# Patient Record
Sex: FEMALE | Race: WHITE | Employment: OTHER | URBAN - METROPOLITAN AREA
[De-identification: names, ages, dates, MRNs, and addresses within clinical notes are randomized per-mention and may not be internally consistent; named-entity substitution may affect disease eponyms.]

---

## 2017-02-14 ENCOUNTER — OFFICE VISIT (OUTPATIENT)
Dept: ORTHOPEDICS CLINIC | Facility: CLINIC | Age: 62
End: 2017-02-14

## 2017-02-14 DIAGNOSIS — M17.12 PRIMARY OSTEOARTHRITIS OF LEFT KNEE: ICD-10-CM

## 2017-02-14 DIAGNOSIS — M17.11 PRIMARY OSTEOARTHRITIS OF RIGHT KNEE: Primary | ICD-10-CM

## 2017-02-14 PROCEDURE — 99213 OFFICE O/P EST LOW 20 MIN: CPT | Performed by: ORTHOPAEDIC SURGERY

## 2017-02-14 PROCEDURE — 99212 OFFICE O/P EST SF 10 MIN: CPT | Performed by: ORTHOPAEDIC SURGERY

## 2017-02-14 RX ORDER — THYROID,PORK 15 MG
TABLET ORAL
Refills: 1 | COMMUNITY
Start: 2017-01-20 | End: 2017-07-24

## 2017-02-14 NOTE — PROGRESS NOTES
2/14/2017  Hannah Hardy  65/1955  64year old   female  Ag Pereira MD    HPI:   Patient presents with:  Knee Pain: Bilateral f/u - she had cortisone inj on l knee on 11/18/16 and Synvisc on the R knee on 11/18/16 - she states the injections d a soft calf. The patient has range motion from 0-120°. Patient has a intact cruciate and collateral ligament exam about the left knee. The patient has medial joint line tenderness to palpation on the left knee. Patient has soft calf.         ASSESSMEN

## 2017-02-23 ENCOUNTER — HOSPITAL ENCOUNTER (OUTPATIENT)
Dept: MRI IMAGING | Facility: HOSPITAL | Age: 62
Discharge: HOME | End: 2017-02-23
Attending: ORTHOPAEDIC SURGERY
Payer: COMMERCIAL

## 2017-02-23 DIAGNOSIS — M17.11 PRIMARY OSTEOARTHRITIS OF RIGHT KNEE: ICD-10-CM

## 2017-02-23 PROCEDURE — 73721 MRI JNT OF LWR EXTRE W/O DYE: CPT

## 2017-02-28 ENCOUNTER — TELEPHONE (OUTPATIENT)
Dept: INTERNAL MEDICINE CLINIC | Facility: CLINIC | Age: 62
End: 2017-02-28

## 2017-02-28 DIAGNOSIS — M25.569 ACUTE KNEE PAIN, UNSPECIFIED LATERALITY: Primary | ICD-10-CM

## 2017-02-28 NOTE — TELEPHONE ENCOUNTER
Pt has an appt with Dr. Mina Andino on Friday for knee injection  Pt was advised a referral is needed for an office visit   RE-Knee injection

## 2017-03-03 ENCOUNTER — OFFICE VISIT (OUTPATIENT)
Dept: ORTHOPEDICS CLINIC | Facility: CLINIC | Age: 62
End: 2017-03-03

## 2017-03-03 DIAGNOSIS — M17.11 PRIMARY OSTEOARTHRITIS OF RIGHT KNEE: ICD-10-CM

## 2017-03-03 DIAGNOSIS — M17.12 PRIMARY OSTEOARTHRITIS OF LEFT KNEE: Primary | ICD-10-CM

## 2017-03-03 PROCEDURE — 99213 OFFICE O/P EST LOW 20 MIN: CPT | Performed by: ORTHOPAEDIC SURGERY

## 2017-03-03 PROCEDURE — 20610 DRAIN/INJ JOINT/BURSA W/O US: CPT | Performed by: ORTHOPAEDIC SURGERY

## 2017-03-03 NOTE — PROGRESS NOTES
Per verbal order from Dr. Tejas Lunsford, draw up 5ml of 1% llidocaine for injection to left knee.   Jovanna Buckner RN

## 2017-03-03 NOTE — H&P
3/3/2017  Eulis Nissen  65/1955  64year old   female  Shawn Ortiz MD    HPI:   Patient presents with:  Knee Pain: left- pt here for synvisc one injection    This is a pleasant 78-year-old female with left knee osteoarthritis.   The patient com OTHER SURGICAL HISTORY      Comment Arthroscopy/Partial Medial Meniscetomy    ELECTROCARDIOGRAM, COMPLETE      Comment Scanned to Media Tab    ELBOW SURGERY Left 2015    Comment elbow radial head replacement      Family History   Problem Relation Age of On of both operative and non-operative treatment were discussed with the patient. This is a pleasant 57-year-old female with bilateral knee osteoarthritis. Patient desired a Synvisc injection into the left knee today.   The patient tolerated the procedure

## 2017-03-07 ENCOUNTER — OFFICE VISIT (OUTPATIENT)
Dept: INTERNAL MEDICINE CLINIC | Facility: CLINIC | Age: 62
End: 2017-03-07

## 2017-03-07 VITALS
DIASTOLIC BLOOD PRESSURE: 82 MMHG | HEART RATE: 69 BPM | HEIGHT: 67 IN | SYSTOLIC BLOOD PRESSURE: 125 MMHG | BODY MASS INDEX: 24.33 KG/M2 | WEIGHT: 155 LBS

## 2017-03-07 DIAGNOSIS — L71.9 ROSACEA: ICD-10-CM

## 2017-03-07 DIAGNOSIS — Z01.818 PREOP EXAMINATION: Primary | ICD-10-CM

## 2017-03-07 DIAGNOSIS — M17.10 OSTEOARTHRITIS OF KNEE, UNSPECIFIED LATERALITY, UNSPECIFIED OSTEOARTHRITIS TYPE: ICD-10-CM

## 2017-03-07 DIAGNOSIS — Z00.00 ROUTINE GENERAL MEDICAL EXAMINATION AT A HEALTH CARE FACILITY: ICD-10-CM

## 2017-03-07 PROCEDURE — 99244 OFF/OP CNSLTJ NEW/EST MOD 40: CPT | Performed by: INTERNAL MEDICINE

## 2017-03-07 PROCEDURE — 99212 OFFICE O/P EST SF 10 MIN: CPT | Performed by: INTERNAL MEDICINE

## 2017-03-07 RX ORDER — METRONIDAZOLE 10 MG/G
GEL TOPICAL
Qty: 55 G | Refills: 5 | Status: SHIPPED | OUTPATIENT
Start: 2017-03-07 | End: 2017-07-21

## 2017-03-07 NOTE — PROGRESS NOTES
HPI:    Patient ID: Kendra Wick is a 64year old female.     HPI     PreOP clearance  And general physical exam  Sees gyne for mammo and pap smeare  Surgeon Dr Ariana Don  right total knee arthroplasty and is scheduled for surgical intervention later on i Smokeless Status: Never Used                        Alcohol Use: Yes           0.0 oz/week       0 Standard drinks or equivalent per week       Comment: 2-3 glasses of wine/week         Review of Systems   Constitutional: Negative for fever, chills, ac Hives    HISTORY:  Past Medical History   Diagnosis Date   • GERD (gastroesophageal reflux disease)    • Hyperlipidemia    • Bone spur      R Foot   • Medial meniscus tear      2012   • Neck pain      neck stenosis   • Osteoarthritis    • Sinus Normal rate, regular rhythm, S1 normal, S2 normal, normal heart sounds and intact distal pulses. Exam reveals no gallop. No murmur heard. Pulmonary/Chest: Effort normal and breath sounds normal. No respiratory distress. She has no wheezes.  She has no 85-GALGUKI, FOLIC ACID SERUM(FOLATE), VITAMIN         B12, EKG 12-LEAD            (M17.9) Osteoarthritis of knee, unspecified laterality, unspecified osteoarthritis type  Plan: as above  Planned srugery    (L71.9) Rosacea  Plan: rx  Topical metroni

## 2017-03-10 ENCOUNTER — OFFICE VISIT (OUTPATIENT)
Dept: INTERNAL MEDICINE CLINIC | Facility: CLINIC | Age: 62
End: 2017-03-10

## 2017-03-10 VITALS
TEMPERATURE: 98 F | HEIGHT: 67 IN | HEART RATE: 67 BPM | SYSTOLIC BLOOD PRESSURE: 129 MMHG | DIASTOLIC BLOOD PRESSURE: 83 MMHG

## 2017-03-10 DIAGNOSIS — L30.9 DERMATITIS: Primary | ICD-10-CM

## 2017-03-10 PROCEDURE — 99212 OFFICE O/P EST SF 10 MIN: CPT | Performed by: INTERNAL MEDICINE

## 2017-03-10 PROCEDURE — 99213 OFFICE O/P EST LOW 20 MIN: CPT | Performed by: INTERNAL MEDICINE

## 2017-03-10 RX ORDER — OLOPATADINE HYDROCHLORIDE 1 MG/ML
SOLUTION/ DROPS OPHTHALMIC
Qty: 20 ML | Refills: 0 | Status: SHIPPED | OUTPATIENT
Start: 2017-03-10 | End: 2017-04-07

## 2017-03-10 RX ORDER — OLOPATADINE HYDROCHLORIDE 1 MG/ML
1 SOLUTION/ DROPS OPHTHALMIC 2 TIMES DAILY
Qty: 1 BOTTLE | Refills: 0 | Status: SHIPPED | OUTPATIENT
Start: 2017-03-10 | End: 2017-03-10

## 2017-03-10 RX ORDER — PHENOL 1.4 %
1 AEROSOL, SPRAY (ML) MUCOUS MEMBRANE DAILY
COMMUNITY
End: 2017-07-24

## 2017-03-10 RX ORDER — BIOTIN 1 MG
5000 TABLET ORAL DAILY
COMMUNITY
End: 2017-07-24

## 2017-03-10 NOTE — PROGRESS NOTES
Fiona Nguyễn is a 64year old female. Patient presents with:  Eye Pain: Pt c/o itching and burning pain in both eyes for a week    HPI:   She has noticed redness, itchiness and burning affecting the skin around both eyes for the past 1 week.   She also pain      neck stenosis   • Osteoarthritis    • Sinusitis    • Anxiety state    • OCD (obsessive compulsive disorder)    • S/P bunionectomy 1986     RIGHT   • H/O arthroscopy of left knee    • S/P arthroscopy of right knee    • Pathological dislocation of

## 2017-03-11 ENCOUNTER — APPOINTMENT (OUTPATIENT)
Dept: LAB | Facility: HOSPITAL | Age: 62
End: 2017-03-11
Attending: INTERNAL MEDICINE
Payer: COMMERCIAL

## 2017-03-11 ENCOUNTER — HOSPITAL ENCOUNTER (OUTPATIENT)
Dept: GENERAL RADIOLOGY | Facility: HOSPITAL | Age: 62
Discharge: HOME | End: 2017-03-11
Attending: INTERNAL MEDICINE
Payer: COMMERCIAL

## 2017-03-11 DIAGNOSIS — Z01.818 PREOP EXAMINATION: ICD-10-CM

## 2017-03-11 DIAGNOSIS — Z01.818 PREOP TESTING: ICD-10-CM

## 2017-03-11 DIAGNOSIS — Z00.00 ROUTINE GENERAL MEDICAL EXAMINATION AT A HEALTH CARE FACILITY: ICD-10-CM

## 2017-03-11 LAB
ALBUMIN SERPL BCP-MCNC: 4.2 G/DL (ref 3.5–4.8)
ALBUMIN/GLOB SERPL: 1.6 {RATIO} (ref 1–2)
ALP SERPL-CCNC: 82 U/L (ref 32–100)
ALT SERPL-CCNC: 25 U/L (ref 14–54)
ANION GAP SERPL CALC-SCNC: 7 MMOL/L (ref 0–18)
ANTIBODY SCREEN: NEGATIVE
APTT PPP: 36.3 SECONDS (ref 23.2–35.3)
AST SERPL-CCNC: 26 U/L (ref 15–41)
BILIRUB SERPL-MCNC: 0.8 MG/DL (ref 0.3–1.2)
BILIRUB UR QL: NEGATIVE
BUN SERPL-MCNC: 15 MG/DL (ref 8–20)
BUN/CREAT SERPL: 17.9 (ref 10–20)
CALCIUM SERPL-MCNC: 9.6 MG/DL (ref 8.5–10.5)
CHLORIDE SERPL-SCNC: 103 MMOL/L (ref 95–110)
CHOLEST SERPL-MCNC: 160 MG/DL (ref 110–200)
CLARITY UR: CLEAR
CO2 SERPL-SCNC: 28 MMOL/L (ref 22–32)
COLOR UR: YELLOW
CREAT SERPL-MCNC: 0.84 MG/DL (ref 0.5–1.5)
ERYTHROCYTE [DISTWIDTH] IN BLOOD BY AUTOMATED COUNT: 12.8 % (ref 11–15)
FOLATE SERPL-MCNC: >23.6 NG/ML
GLOBULIN PLAS-MCNC: 2.7 G/DL (ref 2.5–3.7)
GLUCOSE SERPL-MCNC: 116 MG/DL (ref 70–99)
GLUCOSE UR-MCNC: NEGATIVE MG/DL
HBA1C MFR BLD: 5.7 % (ref 4–6)
HCT VFR BLD AUTO: 41.5 % (ref 35–48)
HDLC SERPL-MCNC: 56 MG/DL
HGB BLD-MCNC: 14.1 G/DL (ref 12–16)
HGB UR QL STRIP.AUTO: NEGATIVE
INR BLD: 0.9 (ref 0.9–1.2)
KETONES UR-MCNC: NEGATIVE MG/DL
LDLC SERPL CALC-MCNC: 94 MG/DL (ref 0–99)
LEUKOCYTE ESTERASE UR QL STRIP.AUTO: NEGATIVE
MCH RBC QN AUTO: 32.2 PG (ref 27–32)
MCHC RBC AUTO-ENTMCNC: 34 G/DL (ref 32–37)
MCV RBC AUTO: 94.7 FL (ref 80–100)
MRSA DNA SPEC QL NAA+PROBE: NEGATIVE
NITRITE UR QL STRIP.AUTO: NEGATIVE
NONHDLC SERPL-MCNC: 104 MG/DL
OSMOLALITY UR CALC.SUM OF ELEC: 288 MOSM/KG (ref 275–295)
PH UR: 6 [PH] (ref 5–8)
PLATELET # BLD AUTO: 233 K/UL (ref 140–400)
PMV BLD AUTO: 7 FL (ref 7.4–10.3)
POTASSIUM SERPL-SCNC: 4.5 MMOL/L (ref 3.3–5.1)
PROT SERPL-MCNC: 6.9 G/DL (ref 5.9–8.4)
PROT UR-MCNC: NEGATIVE MG/DL
PROTHROMBIN TIME: 12 SECONDS (ref 11.8–14.5)
RBC # BLD AUTO: 4.38 M/UL (ref 3.7–5.4)
RH BLOOD TYPE: POSITIVE
SODIUM SERPL-SCNC: 138 MMOL/L (ref 136–144)
SP GR UR STRIP: 1.01 (ref 1–1.03)
T4 FREE SERPL-MCNC: 0.6 NG/DL (ref 0.58–1.64)
TRIGL SERPL-MCNC: 50 MG/DL (ref 1–149)
TSH SERPL-ACNC: 1.01 UIU/ML (ref 0.34–5.6)
UROBILINOGEN UR STRIP-ACNC: <2
VIT B12 SERPL-MCNC: 317 PG/ML (ref 181–914)
VIT C UR-MCNC: NEGATIVE MG/DL
WBC # BLD AUTO: 3.8 K/UL (ref 4–11)

## 2017-03-11 PROCEDURE — 71020 XR CHEST PA + LAT CHEST (CPT=71020): CPT

## 2017-03-11 PROCEDURE — 86850 RBC ANTIBODY SCREEN: CPT

## 2017-03-11 PROCEDURE — 93010 ELECTROCARDIOGRAM REPORT: CPT | Performed by: INTERNAL MEDICINE

## 2017-03-11 PROCEDURE — 82607 VITAMIN B-12: CPT

## 2017-03-11 PROCEDURE — 86901 BLOOD TYPING SEROLOGIC RH(D): CPT

## 2017-03-11 PROCEDURE — 85730 THROMBOPLASTIN TIME PARTIAL: CPT

## 2017-03-11 PROCEDURE — 84439 ASSAY OF FREE THYROXINE: CPT

## 2017-03-11 PROCEDURE — 80053 COMPREHEN METABOLIC PANEL: CPT

## 2017-03-11 PROCEDURE — 85610 PROTHROMBIN TIME: CPT

## 2017-03-11 PROCEDURE — 81003 URINALYSIS AUTO W/O SCOPE: CPT

## 2017-03-11 PROCEDURE — 86900 BLOOD TYPING SEROLOGIC ABO: CPT

## 2017-03-11 PROCEDURE — 93005 ELECTROCARDIOGRAM TRACING: CPT

## 2017-03-11 PROCEDURE — 85027 COMPLETE CBC AUTOMATED: CPT

## 2017-03-11 PROCEDURE — 82746 ASSAY OF FOLIC ACID SERUM: CPT

## 2017-03-11 PROCEDURE — 36415 COLL VENOUS BLD VENIPUNCTURE: CPT

## 2017-03-11 PROCEDURE — 83036 HEMOGLOBIN GLYCOSYLATED A1C: CPT

## 2017-03-11 PROCEDURE — 87641 MR-STAPH DNA AMP PROBE: CPT

## 2017-03-11 PROCEDURE — 84443 ASSAY THYROID STIM HORMONE: CPT

## 2017-03-11 PROCEDURE — 82306 VITAMIN D 25 HYDROXY: CPT

## 2017-03-11 PROCEDURE — 80061 LIPID PANEL: CPT

## 2017-03-13 LAB — 25(OH)D3 SERPL-MCNC: 61.2 NG/ML

## 2017-03-14 ENCOUNTER — TELEPHONE (OUTPATIENT)
Dept: ORTHOPEDICS CLINIC | Facility: CLINIC | Age: 62
End: 2017-03-14

## 2017-03-14 ENCOUNTER — OFFICE VISIT (OUTPATIENT)
Dept: ORTHOPEDICS CLINIC | Facility: CLINIC | Age: 62
End: 2017-03-14

## 2017-03-14 DIAGNOSIS — M17.11 PRIMARY OSTEOARTHRITIS OF RIGHT KNEE: Primary | ICD-10-CM

## 2017-03-14 DIAGNOSIS — M17.12 PRIMARY OSTEOARTHRITIS OF LEFT KNEE: ICD-10-CM

## 2017-03-14 PROCEDURE — 99213 OFFICE O/P EST LOW 20 MIN: CPT | Performed by: ORTHOPAEDIC SURGERY

## 2017-03-14 PROCEDURE — 99212 OFFICE O/P EST SF 10 MIN: CPT | Performed by: ORTHOPAEDIC SURGERY

## 2017-03-14 NOTE — TELEPHONE ENCOUNTER
Pt is having R knee sx on 3/20, pt states her L knee has gotten worse recently, states she feels L knee will be too weak to support recovery of R knee, asking for an appt today. Pls advise thank you.

## 2017-03-14 NOTE — H&P
3/14/2017  Cruz Severe  65/1955  64year old   female  Sandip Hollingsworth MD    HPI:   Patient presents with:  Knee Pain: left- pt states she is still having a lot pain in knee.  pt had Synvisc inj on 3/3/17    This is a pleasant 29-year-old female w Suspension by Nasal route. Disp:  Rfl:    Escitalopram Oxalate (LEXAPRO) 20 MG Oral Tab Take 40 mg by mouth daily.    Disp:  Rfl: 0      HISTORY:  Past Medical History   Diagnosis Date   • GERD (gastroesophageal reflux disease)    • Medial meniscus tear peroneal, deep peroneal, sural, saphenous, and tibial nerve distributions. The patient has 5/5 strength in tibialis anterior, gastrocsoleus complex, EHL, and FHL. The patient has medial and lateral joint line tenderness to palpation about the right knee.

## 2017-03-15 ENCOUNTER — TELEPHONE (OUTPATIENT)
Dept: INTERNAL MEDICINE CLINIC | Facility: CLINIC | Age: 62
End: 2017-03-15

## 2017-03-16 ENCOUNTER — TELEPHONE (OUTPATIENT)
Dept: INTERNAL MEDICINE CLINIC | Facility: CLINIC | Age: 62
End: 2017-03-16

## 2017-03-16 NOTE — TELEPHONE ENCOUNTER
YES   Tylenol only  NO NSAID    EKG STABLE  CXR no acute disease Negative  Labs  All WNL  OK to send her copies  Alexandralacy is cleared for surgery

## 2017-03-16 NOTE — TELEPHONE ENCOUNTER
Reason for Call        Medication Question                    Medication Question        Bri Banda MD (You)   [facility] Clinical Staff   10 hours ago   (5:33 AM)      YES   Tylenol only  NO NSAID    EKG STABLE  CXR no acute disease Negative  Labs  Al

## 2017-03-20 ENCOUNTER — ANESTHESIA EVENT (OUTPATIENT)
Dept: SURGERY | Facility: HOSPITAL | Age: 62
DRG: 470 | End: 2017-03-20
Payer: COMMERCIAL

## 2017-03-20 ENCOUNTER — SURGERY (OUTPATIENT)
Age: 62
End: 2017-03-20

## 2017-03-20 ENCOUNTER — HOSPITAL ENCOUNTER (INPATIENT)
Facility: HOSPITAL | Age: 62
LOS: 2 days | Discharge: HOME | DRG: 470 | End: 2017-03-22
Attending: ORTHOPAEDIC SURGERY | Admitting: ORTHOPAEDIC SURGERY
Payer: COMMERCIAL

## 2017-03-20 ENCOUNTER — APPOINTMENT (OUTPATIENT)
Dept: GENERAL RADIOLOGY | Facility: HOSPITAL | Age: 62
DRG: 470 | End: 2017-03-20
Attending: ORTHOPAEDIC SURGERY
Payer: COMMERCIAL

## 2017-03-20 ENCOUNTER — ANESTHESIA (OUTPATIENT)
Dept: SURGERY | Facility: HOSPITAL | Age: 62
DRG: 470 | End: 2017-03-20
Payer: COMMERCIAL

## 2017-03-20 DIAGNOSIS — M17.11 PRIMARY OSTEOARTHRITIS OF RIGHT KNEE: ICD-10-CM

## 2017-03-20 PROCEDURE — 64450 NJX AA&/STRD OTHER PN/BRANCH: CPT | Performed by: ANESTHESIOLOGY

## 2017-03-20 PROCEDURE — 0SRC0J9 REPLACEMENT OF RIGHT KNEE JOINT WITH SYNTHETIC SUBSTITUTE, CEMENTED, OPEN APPROACH: ICD-10-PCS | Performed by: ORTHOPAEDIC SURGERY

## 2017-03-20 PROCEDURE — 73560 X-RAY EXAM OF KNEE 1 OR 2: CPT

## 2017-03-20 PROCEDURE — 99232 SBSQ HOSP IP/OBS MODERATE 35: CPT | Performed by: HOSPITALIST

## 2017-03-20 DEVICE — CEMENT BONE ZIM PALICOS R +G: Type: IMPLANTABLE DEVICE | Site: KNEE | Status: FUNCTIONAL

## 2017-03-20 DEVICE — PSI PERSONA PS PIN GDE FEM-TIB: Type: IMPLANTABLE DEVICE | Site: KNEE | Status: FUNCTIONAL

## 2017-03-20 DEVICE — PSN ASF PS 10MM VE R 6-9 EF: Type: IMPLANTABLE DEVICE | Site: KNEE | Status: FUNCTIONAL

## 2017-03-20 DEVICE — ALL POLY PAT VE 35MM: Type: IMPLANTABLE DEVICE | Site: KNEE | Status: FUNCTIONAL

## 2017-03-20 DEVICE — PSN TIB STM 5 DEG SZ E R: Type: IMPLANTABLE DEVICE | Site: KNEE | Status: FUNCTIONAL

## 2017-03-20 DEVICE — PSN FEM PS CMT CCR STD SZ8 R: Type: IMPLANTABLE DEVICE | Site: KNEE | Status: FUNCTIONAL

## 2017-03-20 RX ORDER — MELATONIN
325
Status: DISCONTINUED | OUTPATIENT
Start: 2017-03-21 | End: 2017-03-22

## 2017-03-20 RX ORDER — FAMOTIDINE 20 MG/1
20 TABLET ORAL ONCE
Status: DISCONTINUED | OUTPATIENT
Start: 2017-03-20 | End: 2017-03-20 | Stop reason: HOSPADM

## 2017-03-20 RX ORDER — MORPHINE SULFATE 4 MG/ML
4 INJECTION, SOLUTION INTRAMUSCULAR; INTRAVENOUS EVERY 10 MIN PRN
Status: DISCONTINUED | OUTPATIENT
Start: 2017-03-20 | End: 2017-03-20 | Stop reason: HOSPADM

## 2017-03-20 RX ORDER — MORPHINE SULFATE 15 MG/1
15 TABLET ORAL EVERY 4 HOURS PRN
Status: ACTIVE | OUTPATIENT
Start: 2017-03-20 | End: 2017-03-21

## 2017-03-20 RX ORDER — HYDROMORPHONE HYDROCHLORIDE 1 MG/ML
0.6 INJECTION, SOLUTION INTRAMUSCULAR; INTRAVENOUS; SUBCUTANEOUS EVERY 5 MIN PRN
Status: DISCONTINUED | OUTPATIENT
Start: 2017-03-20 | End: 2017-03-20 | Stop reason: HOSPADM

## 2017-03-20 RX ORDER — MAGNESIUM HYDROXIDE 1200 MG/15ML
LIQUID ORAL CONTINUOUS PRN
Status: DISCONTINUED | OUTPATIENT
Start: 2017-03-20 | End: 2017-03-20

## 2017-03-20 RX ORDER — HALOPERIDOL 5 MG/ML
0.25 INJECTION INTRAMUSCULAR ONCE AS NEEDED
Status: DISCONTINUED | OUTPATIENT
Start: 2017-03-20 | End: 2017-03-20 | Stop reason: HOSPADM

## 2017-03-20 RX ORDER — DIPHENHYDRAMINE HYDROCHLORIDE 50 MG/ML
12.5 INJECTION INTRAMUSCULAR; INTRAVENOUS EVERY 4 HOURS PRN
Status: DISCONTINUED | OUTPATIENT
Start: 2017-03-20 | End: 2017-03-22

## 2017-03-20 RX ORDER — ACETAMINOPHEN 500 MG
1000 TABLET ORAL EVERY 8 HOURS
Status: DISPENSED | OUTPATIENT
Start: 2017-03-20 | End: 2017-03-21

## 2017-03-20 RX ORDER — SODIUM CHLORIDE 9 MG/ML
INJECTION, SOLUTION INTRAVENOUS CONTINUOUS
Status: DISCONTINUED | OUTPATIENT
Start: 2017-03-20 | End: 2017-03-22

## 2017-03-20 RX ORDER — MORPHINE SULFATE 4 MG/ML
4 INJECTION, SOLUTION INTRAMUSCULAR; INTRAVENOUS EVERY 2 HOUR PRN
Status: ACTIVE | OUTPATIENT
Start: 2017-03-20 | End: 2017-03-21

## 2017-03-20 RX ORDER — EPHEDRINE SULFATE 50 MG/ML
INJECTION, SOLUTION INTRAVENOUS AS NEEDED
Status: DISCONTINUED | OUTPATIENT
Start: 2017-03-20 | End: 2017-03-20 | Stop reason: SURG

## 2017-03-20 RX ORDER — ESCITALOPRAM OXALATE 10 MG/1
40 TABLET ORAL DAILY
Status: DISCONTINUED | OUTPATIENT
Start: 2017-03-20 | End: 2017-03-22

## 2017-03-20 RX ORDER — DEXAMETHASONE SODIUM PHOSPHATE 4 MG/ML
VIAL (ML) INJECTION AS NEEDED
Status: DISCONTINUED | OUTPATIENT
Start: 2017-03-20 | End: 2017-03-20 | Stop reason: SURG

## 2017-03-20 RX ORDER — CLINDAMYCIN PHOSPHATE 900 MG/50ML
900 INJECTION INTRAVENOUS EVERY 8 HOURS
Status: DISCONTINUED | OUTPATIENT
Start: 2017-03-20 | End: 2017-03-20

## 2017-03-20 RX ORDER — HYDROMORPHONE HYDROCHLORIDE 1 MG/ML
0.4 INJECTION, SOLUTION INTRAMUSCULAR; INTRAVENOUS; SUBCUTANEOUS EVERY 5 MIN PRN
Status: DISCONTINUED | OUTPATIENT
Start: 2017-03-20 | End: 2017-03-20 | Stop reason: HOSPADM

## 2017-03-20 RX ORDER — DOCUSATE SODIUM 100 MG/1
100 CAPSULE, LIQUID FILLED ORAL 2 TIMES DAILY
Status: DISCONTINUED | OUTPATIENT
Start: 2017-03-20 | End: 2017-03-22

## 2017-03-20 RX ORDER — SODIUM CHLORIDE, SODIUM LACTATE, POTASSIUM CHLORIDE, CALCIUM CHLORIDE 600; 310; 30; 20 MG/100ML; MG/100ML; MG/100ML; MG/100ML
INJECTION, SOLUTION INTRAVENOUS CONTINUOUS
Status: DISCONTINUED | OUTPATIENT
Start: 2017-03-20 | End: 2017-03-22

## 2017-03-20 RX ORDER — QUETIAPINE 25 MG/1
25 TABLET, FILM COATED ORAL NIGHTLY
Status: DISCONTINUED | OUTPATIENT
Start: 2017-03-20 | End: 2017-03-22

## 2017-03-20 RX ORDER — HYDROCODONE BITARTRATE AND ACETAMINOPHEN 5; 325 MG/1; MG/1
1 TABLET ORAL AS NEEDED
Status: DISCONTINUED | OUTPATIENT
Start: 2017-03-20 | End: 2017-03-20 | Stop reason: HOSPADM

## 2017-03-20 RX ORDER — SODIUM CHLORIDE 0.9 % (FLUSH) 0.9 %
10 SYRINGE (ML) INJECTION AS NEEDED
Status: DISCONTINUED | OUTPATIENT
Start: 2017-03-20 | End: 2017-03-22

## 2017-03-20 RX ORDER — DOXEPIN HYDROCHLORIDE 50 MG/1
1 CAPSULE ORAL DAILY
Status: DISCONTINUED | OUTPATIENT
Start: 2017-03-20 | End: 2017-03-22

## 2017-03-20 RX ORDER — MORPHINE SULFATE 2 MG/ML
2 INJECTION, SOLUTION INTRAMUSCULAR; INTRAVENOUS EVERY 2 HOUR PRN
Status: DISCONTINUED | OUTPATIENT
Start: 2017-03-20 | End: 2017-03-22

## 2017-03-20 RX ORDER — MORPHINE SULFATE 4 MG/ML
4 INJECTION, SOLUTION INTRAMUSCULAR; INTRAVENOUS EVERY 2 HOUR PRN
Status: DISCONTINUED | OUTPATIENT
Start: 2017-03-20 | End: 2017-03-22

## 2017-03-20 RX ORDER — DIPHENHYDRAMINE HYDROCHLORIDE 50 MG/ML
25 INJECTION INTRAMUSCULAR; INTRAVENOUS ONCE AS NEEDED
Status: ACTIVE | OUTPATIENT
Start: 2017-03-20 | End: 2017-03-20

## 2017-03-20 RX ORDER — OXYCODONE HCL 10 MG/1
10 TABLET, FILM COATED, EXTENDED RELEASE ORAL EVERY 12 HOURS
Qty: 10 TABLET | Refills: 0 | Status: SHIPPED | OUTPATIENT
Start: 2017-03-20 | End: 2017-03-22

## 2017-03-20 RX ORDER — NALOXONE HYDROCHLORIDE 0.4 MG/ML
80 INJECTION, SOLUTION INTRAMUSCULAR; INTRAVENOUS; SUBCUTANEOUS AS NEEDED
Status: DISCONTINUED | OUTPATIENT
Start: 2017-03-20 | End: 2017-03-20 | Stop reason: HOSPADM

## 2017-03-20 RX ORDER — HYDROCODONE BITARTRATE AND ACETAMINOPHEN 10; 325 MG/1; MG/1
2 TABLET ORAL EVERY 6 HOURS PRN
Qty: 40 TABLET | Refills: 0 | Status: SHIPPED | OUTPATIENT
Start: 2017-03-20 | End: 2017-04-07

## 2017-03-20 RX ORDER — ACETAMINOPHEN 325 MG/1
650 TABLET ORAL EVERY 4 HOURS PRN
Status: DISCONTINUED | OUTPATIENT
Start: 2017-03-20 | End: 2017-03-22

## 2017-03-20 RX ORDER — HYDROCODONE BITARTRATE AND ACETAMINOPHEN 5; 325 MG/1; MG/1
2 TABLET ORAL EVERY 4 HOURS PRN
Status: DISCONTINUED | OUTPATIENT
Start: 2017-03-20 | End: 2017-03-22

## 2017-03-20 RX ORDER — FLUTICASONE PROPIONATE 50 MCG
1 SPRAY, SUSPENSION (ML) NASAL DAILY
Status: DISCONTINUED | OUTPATIENT
Start: 2017-03-20 | End: 2017-03-22

## 2017-03-20 RX ORDER — DIPHENHYDRAMINE HCL 25 MG
25 CAPSULE ORAL EVERY 4 HOURS PRN
Status: DISCONTINUED | OUTPATIENT
Start: 2017-03-20 | End: 2017-03-22

## 2017-03-20 RX ORDER — ONDANSETRON 2 MG/ML
4 INJECTION INTRAMUSCULAR; INTRAVENOUS EVERY 4 HOURS PRN
Status: DISCONTINUED | OUTPATIENT
Start: 2017-03-20 | End: 2017-03-22

## 2017-03-20 RX ORDER — CLINDAMYCIN PHOSPHATE 900 MG/50ML
900 INJECTION INTRAVENOUS EVERY 8 HOURS
Status: COMPLETED | OUTPATIENT
Start: 2017-03-20 | End: 2017-03-21

## 2017-03-20 RX ORDER — ATORVASTATIN CALCIUM 40 MG/1
40 TABLET, FILM COATED ORAL
Status: DISCONTINUED | OUTPATIENT
Start: 2017-03-20 | End: 2017-03-21

## 2017-03-20 RX ORDER — MORPHINE SULFATE 4 MG/ML
6 INJECTION, SOLUTION INTRAMUSCULAR; INTRAVENOUS EVERY 2 HOUR PRN
Status: ACTIVE | OUTPATIENT
Start: 2017-03-20 | End: 2017-03-21

## 2017-03-20 RX ORDER — OXYCODONE HYDROCHLORIDE 5 MG/1
10 TABLET ORAL EVERY 4 HOURS PRN
Status: DISPENSED | OUTPATIENT
Start: 2017-03-20 | End: 2017-03-21

## 2017-03-20 RX ORDER — CLINDAMYCIN PHOSPHATE 900 MG/50ML
900 INJECTION INTRAVENOUS ONCE
Status: DISCONTINUED | OUTPATIENT
Start: 2017-03-20 | End: 2017-03-20 | Stop reason: HOSPADM

## 2017-03-20 RX ORDER — HYDROMORPHONE HYDROCHLORIDE 1 MG/ML
0.2 INJECTION, SOLUTION INTRAMUSCULAR; INTRAVENOUS; SUBCUTANEOUS EVERY 5 MIN PRN
Status: DISCONTINUED | OUTPATIENT
Start: 2017-03-20 | End: 2017-03-20 | Stop reason: HOSPADM

## 2017-03-20 RX ORDER — METOCLOPRAMIDE HYDROCHLORIDE 5 MG/ML
10 INJECTION INTRAMUSCULAR; INTRAVENOUS EVERY 6 HOURS PRN
Status: ACTIVE | OUTPATIENT
Start: 2017-03-20 | End: 2017-03-22

## 2017-03-20 RX ORDER — SODIUM PHOSPHATE, DIBASIC AND SODIUM PHOSPHATE, MONOBASIC 7; 19 G/133ML; G/133ML
1 ENEMA RECTAL ONCE AS NEEDED
Status: ACTIVE | OUTPATIENT
Start: 2017-03-20 | End: 2017-03-20

## 2017-03-20 RX ORDER — METOCLOPRAMIDE 10 MG/1
10 TABLET ORAL ONCE
Status: DISCONTINUED | OUTPATIENT
Start: 2017-03-20 | End: 2017-03-20 | Stop reason: HOSPADM

## 2017-03-20 RX ORDER — BISACODYL 10 MG
10 SUPPOSITORY, RECTAL RECTAL
Status: DISCONTINUED | OUTPATIENT
Start: 2017-03-20 | End: 2017-03-22

## 2017-03-20 RX ORDER — LIDOCAINE HYDROCHLORIDE 10 MG/ML
INJECTION, SOLUTION EPIDURAL; INFILTRATION; INTRACAUDAL; PERINEURAL AS NEEDED
Status: DISCONTINUED | OUTPATIENT
Start: 2017-03-20 | End: 2017-03-20 | Stop reason: SURG

## 2017-03-20 RX ORDER — MORPHINE SULFATE 2 MG/ML
2 INJECTION, SOLUTION INTRAMUSCULAR; INTRAVENOUS EVERY 2 HOUR PRN
Status: ACTIVE | OUTPATIENT
Start: 2017-03-20 | End: 2017-03-21

## 2017-03-20 RX ORDER — ONDANSETRON 2 MG/ML
INJECTION INTRAMUSCULAR; INTRAVENOUS AS NEEDED
Status: DISCONTINUED | OUTPATIENT
Start: 2017-03-20 | End: 2017-03-20 | Stop reason: SURG

## 2017-03-20 RX ORDER — MORPHINE SULFATE 2 MG/ML
2 INJECTION, SOLUTION INTRAMUSCULAR; INTRAVENOUS EVERY 10 MIN PRN
Status: DISCONTINUED | OUTPATIENT
Start: 2017-03-20 | End: 2017-03-20 | Stop reason: HOSPADM

## 2017-03-20 RX ORDER — SODIUM CHLORIDE, SODIUM LACTATE, POTASSIUM CHLORIDE, CALCIUM CHLORIDE 600; 310; 30; 20 MG/100ML; MG/100ML; MG/100ML; MG/100ML
INJECTION, SOLUTION INTRAVENOUS CONTINUOUS PRN
Status: DISCONTINUED | OUTPATIENT
Start: 2017-03-20 | End: 2017-03-20 | Stop reason: SURG

## 2017-03-20 RX ORDER — POLYETHYLENE GLYCOL 3350 17 G/17G
17 POWDER, FOR SOLUTION ORAL DAILY PRN
Status: DISCONTINUED | OUTPATIENT
Start: 2017-03-20 | End: 2017-03-22

## 2017-03-20 RX ORDER — OXYCODONE HCL 10 MG/1
10 TABLET, FILM COATED, EXTENDED RELEASE ORAL EVERY 12 HOURS
Status: DISCONTINUED | OUTPATIENT
Start: 2017-03-20 | End: 2017-03-22

## 2017-03-20 RX ORDER — MORPHINE SULFATE 2 MG/ML
1 INJECTION, SOLUTION INTRAMUSCULAR; INTRAVENOUS EVERY 2 HOUR PRN
Status: DISCONTINUED | OUTPATIENT
Start: 2017-03-20 | End: 2017-03-22

## 2017-03-20 RX ORDER — LEVOTHYROXINE AND LIOTHYRONINE 19; 4.5 UG/1; UG/1
15 TABLET ORAL DAILY
Status: DISCONTINUED | OUTPATIENT
Start: 2017-03-21 | End: 2017-03-22

## 2017-03-20 RX ORDER — MIDAZOLAM HYDROCHLORIDE 1 MG/ML
INJECTION INTRAMUSCULAR; INTRAVENOUS AS NEEDED
Status: DISCONTINUED | OUTPATIENT
Start: 2017-03-20 | End: 2017-03-20 | Stop reason: SURG

## 2017-03-20 RX ORDER — ZOLPIDEM TARTRATE 5 MG/1
5 TABLET ORAL NIGHTLY PRN
Status: DISCONTINUED | OUTPATIENT
Start: 2017-03-20 | End: 2017-03-22

## 2017-03-20 RX ORDER — SENNOSIDES 8.6 MG
17.2 TABLET ORAL NIGHTLY
Status: DISCONTINUED | OUTPATIENT
Start: 2017-03-20 | End: 2017-03-22

## 2017-03-20 RX ORDER — ACETAMINOPHEN 325 MG/1
650 TABLET ORAL ONCE
Status: COMPLETED | OUTPATIENT
Start: 2017-03-20 | End: 2017-03-20

## 2017-03-20 RX ORDER — ROPIVACAINE HYDROCHLORIDE 5 MG/ML
INJECTION, SOLUTION EPIDURAL; INFILTRATION; PERINEURAL AS NEEDED
Status: DISCONTINUED | OUTPATIENT
Start: 2017-03-20 | End: 2017-03-20 | Stop reason: SURG

## 2017-03-20 RX ORDER — OXYCODONE HYDROCHLORIDE 5 MG/1
5 TABLET ORAL EVERY 4 HOURS PRN
Status: DISPENSED | OUTPATIENT
Start: 2017-03-20 | End: 2017-03-21

## 2017-03-20 RX ORDER — HYDROCODONE BITARTRATE AND ACETAMINOPHEN 5; 325 MG/1; MG/1
1 TABLET ORAL EVERY 4 HOURS PRN
Status: DISCONTINUED | OUTPATIENT
Start: 2017-03-20 | End: 2017-03-22

## 2017-03-20 RX ORDER — ONDANSETRON 2 MG/ML
4 INJECTION INTRAMUSCULAR; INTRAVENOUS ONCE AS NEEDED
Status: DISCONTINUED | OUTPATIENT
Start: 2017-03-20 | End: 2017-03-20 | Stop reason: HOSPADM

## 2017-03-20 RX ORDER — DEXAMETHASONE SODIUM PHOSPHATE 10 MG/ML
INJECTION, SOLUTION INTRAMUSCULAR; INTRAVENOUS AS NEEDED
Status: DISCONTINUED | OUTPATIENT
Start: 2017-03-20 | End: 2017-03-20 | Stop reason: SURG

## 2017-03-20 RX ORDER — MORPHINE SULFATE 10 MG/ML
6 INJECTION, SOLUTION INTRAMUSCULAR; INTRAVENOUS EVERY 10 MIN PRN
Status: DISCONTINUED | OUTPATIENT
Start: 2017-03-20 | End: 2017-03-20 | Stop reason: HOSPADM

## 2017-03-20 RX ORDER — HYDROCODONE BITARTRATE AND ACETAMINOPHEN 5; 325 MG/1; MG/1
2 TABLET ORAL AS NEEDED
Status: DISCONTINUED | OUTPATIENT
Start: 2017-03-20 | End: 2017-03-20 | Stop reason: HOSPADM

## 2017-03-20 RX ADMIN — LIDOCAINE HYDROCHLORIDE 1 ML: 10 INJECTION, SOLUTION EPIDURAL; INFILTRATION; INTRACAUDAL; PERINEURAL at 11:10:00

## 2017-03-20 RX ADMIN — ROPIVACAINE HYDROCHLORIDE 20 ML: 5 INJECTION, SOLUTION EPIDURAL; INFILTRATION; PERINEURAL at 11:16:00

## 2017-03-20 RX ADMIN — EPHEDRINE SULFATE 10 MG: 50 INJECTION, SOLUTION INTRAVENOUS at 12:17:00

## 2017-03-20 RX ADMIN — EPHEDRINE SULFATE 10 MG: 50 INJECTION, SOLUTION INTRAVENOUS at 12:20:00

## 2017-03-20 RX ADMIN — ROPIVACAINE HYDROCHLORIDE 20 ML: 5 INJECTION, SOLUTION EPIDURAL; INFILTRATION; PERINEURAL at 11:10:00

## 2017-03-20 RX ADMIN — MIDAZOLAM HYDROCHLORIDE 2 MG: 1 INJECTION INTRAMUSCULAR; INTRAVENOUS at 11:10:00

## 2017-03-20 RX ADMIN — DEXAMETHASONE SODIUM PHOSPHATE 4 MG: 4 MG/ML VIAL (ML) INJECTION at 12:13:00

## 2017-03-20 RX ADMIN — SODIUM CHLORIDE, SODIUM LACTATE, POTASSIUM CHLORIDE, CALCIUM CHLORIDE: 600; 310; 30; 20 INJECTION, SOLUTION INTRAVENOUS at 13:20:00

## 2017-03-20 RX ADMIN — ONDANSETRON 4 MG: 2 INJECTION INTRAMUSCULAR; INTRAVENOUS at 13:52:00

## 2017-03-20 RX ADMIN — DEXAMETHASONE SODIUM PHOSPHATE 4 MG: 10 INJECTION, SOLUTION INTRAMUSCULAR; INTRAVENOUS at 11:10:00

## 2017-03-20 RX ADMIN — LIDOCAINE HYDROCHLORIDE 1 ML: 10 INJECTION, SOLUTION EPIDURAL; INFILTRATION; INTRACAUDAL; PERINEURAL at 11:16:00

## 2017-03-20 RX ADMIN — SODIUM CHLORIDE, SODIUM LACTATE, POTASSIUM CHLORIDE, CALCIUM CHLORIDE: 600; 310; 30; 20 INJECTION, SOLUTION INTRAVENOUS at 12:00:00

## 2017-03-20 RX ADMIN — DEXAMETHASONE SODIUM PHOSPHATE 4 MG: 10 INJECTION, SOLUTION INTRAMUSCULAR; INTRAVENOUS at 11:16:00

## 2017-03-20 RX ADMIN — SODIUM CHLORIDE, SODIUM LACTATE, POTASSIUM CHLORIDE, CALCIUM CHLORIDE: 600; 310; 30; 20 INJECTION, SOLUTION INTRAVENOUS at 14:18:00

## 2017-03-20 RX ADMIN — LIDOCAINE HYDROCHLORIDE 50 MG: 10 INJECTION, SOLUTION EPIDURAL; INFILTRATION; INTRACAUDAL; PERINEURAL at 12:04:00

## 2017-03-20 NOTE — PLAN OF CARE
DISCHARGE PLANNING    • Discharge to home or other facility with appropriate resources Progressing        HEMATOLOGIC - ADULT    • Maintains hematologic stability Progressing    • Free from bleeding injury Progressing        PAIN - ADULT    • Verbalizes/di

## 2017-03-20 NOTE — BRIEF OP NOTE
[facility] 45  Brief Op Note     Emily Leonard Location: OR   CSN [id number] MRN [id number]   Admission Date 3/20/2017 Operation Date 3/20/2017   Attending Physician Jw Ferreira MD Operating Physician Evelio Sierra MD

## 2017-03-20 NOTE — PROGRESS NOTES
[facility]    Progress Note    Jensincere Part Patient Status:  Inpatient    1955 MRN [id number]   Location [facility] 4W/SW/SE Attending Leni Morales MD   Hosp Day # 0 PCP Royce Shannon MD     Subjective:     Hunter Fuller Results  Component Value Date   WBC 3.8* 03/11/2017   HGB 14.1 03/11/2017   HCT 41.5 03/11/2017    03/11/2017   CREATSERUM 0.84 03/11/2017   BUN 15 03/11/2017    03/11/2017   K 4.5 03/11/2017    03/11/2017   CO2 28 03/11/2017   *

## 2017-03-20 NOTE — PHYSICAL THERAPY NOTE
PHYSICAL THERAPY KNEE EVALUATION - INPATIENT     Room Number: 415/415-A  Evaluation Date: 3/20/2017  Type of Evaluation: Initial  Physician Order: PT Eval and Treat    Presenting Problem: R TKA  Reason for Therapy: Mobility Dysfunction and Discharge Planni osteoarthritis of right knee  Active Problems:    Hyperlipidemia      Past Medical History  Past Medical History   Diagnosis Date   • GERD (gastroesophageal reflux disease)    • Medial meniscus tear      2012   • Neck pain      neck stenosis   • Osteoarthr 80    AM-PAC '6-Clicks' [address]  How much difficulty does the patient currently have. ..  -   Turning over in bed (including adjusting bedclothes, sheets and blankets)?: A Little   -   Sitting down on and standing up from a osmany Goal #5   Current Status    Goal #6 Patient independently performs home exercise program for ROM/strengthening per the instructions provided in preparation for discharge.    Goal #6  Current Status

## 2017-03-20 NOTE — ANESTHESIA POSTPROCEDURE EVALUATION
Patient: Devi Pulido    Procedure Summary     Date Anesthesia Start Anesthesia Stop Room / Location    03/20/17 1200 1419 300 [facility] MAIN OR 06 / 300 [facility] MAIN OR       Procedure Diagnosis Surgeon Responsible Provider    KNEE TOTAL REPLACEMENT (Right Knee) (Right

## 2017-03-20 NOTE — INTERVAL H&P NOTE
Pre-op Diagnosis: Right knee osteoarthritis    The above referenced H&P was reviewed by Tahir Cano MD on 3/20/2017, the patient was examined and no significant changes have occurred in the patient's condition since the H&P was performed.   I discus

## 2017-03-20 NOTE — H&P (VIEW-ONLY)
3/3/2017  Emily Aisha  65/1955  64year old   female  Evelio Sierra MD    HPI:   Patient presents with:  Knee Pain: left- pt here for synvisc one injection    This is a pleasant 77-year-old female with left knee osteoarthritis.   The patient com OTHER SURGICAL HISTORY      Comment Arthroscopy/Partial Medial Meniscetomy    ELECTROCARDIOGRAM, COMPLETE      Comment Scanned to Media Tab    ELBOW SURGERY Left 2015    Comment elbow radial head replacement      Family History   Problem Relation Age of On of both operative and non-operative treatment were discussed with the patient. This is a pleasant 26-year-old female with bilateral knee osteoarthritis. Patient desired a Synvisc injection into the left knee today.   The patient tolerated the procedure

## 2017-03-20 NOTE — ANESTHESIA PROCEDURE NOTES
Peripheral Block    Anesthesiologist:  Thor Anderson  Performed by:   Anesthesiologist  Patient Location:  Pre-op  Start Time:  3/20/2017 11:10 AM  End Time:  3/20/2017 11:13 AM  Site Identification: ultrasound guided, real time ultrasound guided and IMAGE pressure, negative aspiration for heme and no pain on injection  Paresthesia Pain:  None  Heart Rate Change: No    Slow Fractionated Injection: Yes

## 2017-03-20 NOTE — ANESTHESIA PREPROCEDURE EVALUATION
Anesthesia PreOp Note    HPI:     Rasheeda Beavers is a 64year old female who presents for preoperative consultation requested by: Tanvir Kessler MD    Date of Surgery: 3/20/2017    Procedure(s):  KNEE TOTAL REPLACEMENT  Indication: Right knee osteo capsules by mouth 3 (three) times daily with meals. Disp:  Rfl:  Past Week at Unknown time   Multiple Vitamins-Minerals (MULTIVITAMIN ADULTS 50+) Oral Tab Take 1 tablet by mouth daily.  Disp:  Rfl:  Past Week at Unknown time   MetroNIDAZOLE (METROGEL) 1 % E sodium chloride 0.9 % syringe  Intra-articular Once Reggie Balbuena MD    Tranexamic Acid (CYKLOKAPRON) 1,000 mg in sodium chloride 0.9 % 60 mL IVPB 1,000 mg Intravenous Once Reggie Balbuena MD    Tranexamic Acid (CYKLOKAPRON) 1,000 mg in sodium temperature is 97.7 °F (36.5 °C). Her blood pressure is 103/73 and her pulse is 76.  Her respiration is 16.    03/10/17  1821 03/20/17  1024   BP:  103/73   Pulse:  76   Temp:  97.7 °F (36.5 °C)   Resp:  16   Height: 1.702 m (5' 7\")    Weight: 68.04 kg (15

## 2017-03-20 NOTE — OPERATIVE REPORT
[facility]    PATIENT'S NAME: Nati Rowley   ATTENDING PHYSICIAN: Laura Vigil MD   OPERATING PHYSICIAN: Franklyn Mills MD   PATIENT ACCOUNT#:   [id number]    LOCATION:  [facility] [address] PACU 5 [facility] 10  MEDICAL RECORD #:   [id number]       DATE The patient's right lower extremity was then prepped and draped in standard surgical fashion. Perioperative antibiotics were infused. Confirmation of identity and laterality took place. A time-out was performed.     An incision, 2 fingerbreadths proximal allowed to cure. At this time, the knee was tested and had range of motion from 0 to 130 degrees. It was stable to varus valgus stress at 0 and 30 degrees. Patellar tracking was excellent. Excess cement was taken out of the wound.   The final polyethyle

## 2017-03-21 LAB
ERYTHROCYTE [DISTWIDTH] IN BLOOD BY AUTOMATED COUNT: 12.7 % (ref 11–15)
HCT VFR BLD AUTO: 31.5 % (ref 35–48)
HGB BLD-MCNC: 10.8 G/DL (ref 12–16)
MCH RBC QN AUTO: 32.6 PG (ref 27–32)
MCHC RBC AUTO-ENTMCNC: 34.2 G/DL (ref 32–37)
MCV RBC AUTO: 95.4 FL (ref 80–100)
PLATELET # BLD AUTO: 202 K/UL (ref 140–400)
PMV BLD AUTO: 7.4 FL (ref 7.4–10.3)
RBC # BLD AUTO: 3.3 M/UL (ref 3.7–5.4)
WBC # BLD AUTO: 12.2 K/UL (ref 4–11)

## 2017-03-21 PROCEDURE — 99232 SBSQ HOSP IP/OBS MODERATE 35: CPT | Performed by: HOSPITALIST

## 2017-03-21 RX ORDER — ATORVASTATIN CALCIUM 40 MG/1
40 TABLET, FILM COATED ORAL NIGHTLY
Status: DISCONTINUED | OUTPATIENT
Start: 2017-03-21 | End: 2017-03-22

## 2017-03-21 NOTE — CM/SW NOTE
CTL/Rounds: Spoke with patient at bedside regarding discharge planning. Patient states that she is independent with ADLs and driving. Patient lives with her partner who is retired and will be home to assist as needed.  She lives in a bi level home with [facility]

## 2017-03-21 NOTE — OCCUPATIONAL THERAPY NOTE
OCCUPATIONAL THERAPY EVALUATION - INPATIENT      Room Number: 415/415-A  Evaluation Date: 3/21/2017  Type of Evaluation: Initial  Presenting Problem:  (R TKA)    Physician Order: IP Consult to Occupational Therapy  Reason for Therapy: ADL/IADL Dysfunction TOTAL KNEE REPLACEMENT Right 3/20/17    Comment DR. PATRICK       HOME SITUATION  Type of Home: House  Home Layout: Multi-level  Lives With:  (Partner)    Toilet and Equipment: Standard height toilet  Shower/Tub and Equipment: Tub-shower combo (Stool) tested  Sit to Stand: Supervision    Toilet Transfer: CGA- no grab bars used (has riser at home but used standard height in room)  Shower Transfer: CGA  Chair Transfer: CGA  Car Transfer: CGA    Bedroom Mobility: CGA/Supervision      FUNCTIONAL ADL ASSESSM

## 2017-03-21 NOTE — PROGRESS NOTES
[facility]    Progress Note    Ayleen Venegas Patient Status:  Inpatient    1955 MRN [id number]   Location [facility] 4W/SW/SE Attending Lester Narayanan MD   Hosp Day # 1 PCP John Hurst MD     Subjective:  Pain well control • docusate sodium  100 mg Oral BID   • rivaroxaban  10 mg Oral Q24H   • OxyCODONE HCl ER  10 mg Oral Q12H   • ferrous sulfate  325 mg Oral Daily with breakfast   • thyroid  15 mg Oral Daily   • Fluticasone Propionate  1 spray Nasal Daily   • bacitracin (

## 2017-03-21 NOTE — PROGRESS NOTES
[address] [facility] Patient Status:  Inpatient    1955 MRN [id number]   Location [facility] 4W/SW/SE Attending Ashwin Zuñiga MD   [facility] Day # 1 PCP Burma Opitz, MD     Subjective:  Post-Operative Day: 1 Status Post right

## 2017-03-21 NOTE — PROGRESS NOTES
03/21/17 1451   Clinical Encounter Type   Visited With Patient   Routine Visit Introduction   Continue Visiting Yes    met with pt alone, and introduced her to spiritual care. Pt receptive of visit, but did not desire spiritual care at the time.

## 2017-03-21 NOTE — PHYSICAL THERAPY NOTE
PHYSICAL THERAPY KNEE TREATMENT NOTE - INPATIENT     Room Number: 951/059-N             Presenting Problem: R TKA    Problem List  Principal Problem:    Primary osteoarthritis of right knee  Active Problems:    Hyperlipidemia      ASSESSMENT   Increase se Need to walk in hospital room?: A Little   -   Climbing 3-5 steps with a railing?: A Little    AM-PAC Score:  Raw Score: 20   PT Approx Degree of Impairment Score: 35.83%   Standardized Score (AM-PAC Scale): 47.67   CMS Modifier (G-Code): CJ    FUNCTIONAL Goal  Patient's self-stated goal is: to go home    Goal #1  Patient is able to demonstrate supine - sit EOB @ level: modified independent       Goal #1     Current Status   ongoing 3/21/2017     Goal #2  Patient is able to demonstrate transfers Sit to/from

## 2017-03-22 VITALS
HEIGHT: 67 IN | HEART RATE: 81 BPM | OXYGEN SATURATION: 95 % | BODY MASS INDEX: 23.23 KG/M2 | TEMPERATURE: 100 F | RESPIRATION RATE: 18 BRPM | DIASTOLIC BLOOD PRESSURE: 70 MMHG | SYSTOLIC BLOOD PRESSURE: 138 MMHG | WEIGHT: 148 LBS

## 2017-03-22 LAB
ERYTHROCYTE [DISTWIDTH] IN BLOOD BY AUTOMATED COUNT: 12.8 % (ref 11–15)
HCT VFR BLD AUTO: 30 % (ref 35–48)
HGB BLD-MCNC: 10.2 G/DL (ref 12–16)
MCH RBC QN AUTO: 32.4 PG (ref 27–32)
MCHC RBC AUTO-ENTMCNC: 33.9 G/DL (ref 32–37)
MCV RBC AUTO: 95.7 FL (ref 80–100)
PLATELET # BLD AUTO: 204 K/UL (ref 140–400)
PMV BLD AUTO: 7.4 FL (ref 7.4–10.3)
RBC # BLD AUTO: 3.14 M/UL (ref 3.7–5.4)
WBC # BLD AUTO: 7.6 K/UL (ref 4–11)

## 2017-03-22 PROCEDURE — 99239 HOSP IP/OBS DSCHRG MGMT >30: CPT | Performed by: HOSPITALIST

## 2017-03-22 RX ORDER — OXYCODONE HCL 20 MG/1
20 TABLET, FILM COATED, EXTENDED RELEASE ORAL EVERY 12 HOURS
Qty: 10 TABLET | Refills: 0 | Status: SHIPPED | OUTPATIENT
Start: 2017-03-22 | End: 2017-04-04 | Stop reason: ALTCHOICE

## 2017-03-22 RX ORDER — HYDROCODONE BITARTRATE AND ACETAMINOPHEN 10; 325 MG/1; MG/1
2 TABLET ORAL EVERY 6 HOURS PRN
Status: DISCONTINUED | OUTPATIENT
Start: 2017-03-22 | End: 2017-03-22

## 2017-03-22 RX ORDER — OXYCODONE HCL 20 MG/1
20 TABLET, FILM COATED, EXTENDED RELEASE ORAL EVERY 12 HOURS
Status: DISCONTINUED | OUTPATIENT
Start: 2017-03-22 | End: 2017-03-22

## 2017-03-22 RX ORDER — PSEUDOEPHEDRINE HCL 30 MG
100 TABLET ORAL 2 TIMES DAILY
Qty: 60 CAPSULE | Refills: 0 | Status: SHIPPED | OUTPATIENT
Start: 2017-03-22 | End: 2017-04-07

## 2017-03-22 RX ORDER — HYDROCODONE BITARTRATE AND ACETAMINOPHEN 10; 325 MG/1; MG/1
1 TABLET ORAL EVERY 4 HOURS PRN
Status: DISCONTINUED | OUTPATIENT
Start: 2017-03-22 | End: 2017-03-22

## 2017-03-22 NOTE — PROGRESS NOTES
[address] [facility] Patient Status:  Inpatient    1955 MRN [id number]   Location [facility] 4W/SW/SE Attending Dudley Madrigal MD   Hosp Day # 2 PCP Hailey He MD     Subjective:  Post-Operative Day: 2 Status Post right

## 2017-03-22 NOTE — HOME CARE LIAISON
PATIENT IS 64YEAR OLD FEMALE S/P R TKA. MET WITH PATIENT AT BEDSIDE TO DISCUSS [address]. PATIENT REPORTS LIVING AT HOME WITH LIFE PARTNER WHO CAN ASSIST WITH CARE WHEN NEEDED. PATIENT WILL NEED WALKER AT TIME OF D/C FROM HOSPITAL.  PATIENT IS AGREEABLE

## 2017-03-22 NOTE — PLAN OF CARE
DISCHARGE PLANNING    • Discharge to home or other facility with appropriate resources Adequate for Discharge        HEMATOLOGIC - ADULT    • Maintains hematologic stability Adequate for Discharge    • Free from bleeding injury Adequate for Discharge

## 2017-03-22 NOTE — OCCUPATIONAL THERAPY NOTE
Pt not seen for OT at this time secondary to pt up in chair and refusing therapy session due to pain, 7/10. Nursing is aware. Pt stated partner assisted with dressing this AM for sock placement.   Pt stated ambulated to toilet earlier, with use of grab ba

## 2017-03-22 NOTE — PHYSICAL THERAPY NOTE
PHYSICAL THERAPY KNEE TREATMENT NOTE - INPATIENT     Room Number: 830/320-V         Presenting Problem: R TKA    Problem List  Principal Problem:    Primary osteoarthritis of right knee  Active Problems:    Hyperlipidemia      ASSESSMENT   Pain limits her None   -   Need to walk in hospital room?: A Little   -   Climbing 3-5 steps with a railing?: A Little    AM-PAC Score:  Raw Score: 22   PT Approx Degree of Impairment Score: 20.91%   Standardized Score (AM-PAC Scale): 53.28   CMS Modifier (G-Code): CJ Patient is able to demonstrate supine - sit EOB @ level: modified independent       Goal #1     Current Status   mod I 3/22/2017     Goal #2  Patient is able to demonstrate transfers Sit to/from Stand at assistance level: modified independent       Goal #2

## 2017-03-23 ENCOUNTER — TELEPHONE (OUTPATIENT)
Dept: ORTHOPEDICS CLINIC | Facility: CLINIC | Age: 62
End: 2017-03-23

## 2017-03-23 ENCOUNTER — TELEPHONE (OUTPATIENT)
Dept: INTERNAL MEDICINE UNIT | Facility: HOSPITAL | Age: 62
End: 2017-03-23

## 2017-03-23 NOTE — TELEPHONE ENCOUNTER
Patient discharged from [facility] on March 22, 2017. Please call patient to schedule hospital follow-up appointment with PCP, Dr. Howard Morejon or whomever patient wishes to follow-up with.

## 2017-03-23 NOTE — TELEPHONE ENCOUNTER
Pt had question about timed intervals of Norco medication. Pt wants to go down to one tablet every 4 hours prn instead of two tabs every 6 hours. Verified that this timing interval was ok.    JOEY

## 2017-03-23 NOTE — DISCHARGE SUMMARY
[facility]    Discharge Summary    Tyra Handing Patient Status:  Inpatient    1955 MRN [id number]   Location [facility] 4W/SW/SE Attending No att. providers found   [facility] Day # 2 PCP Shimon Parker MD     Date of Admiss HYDROcodone-acetaminophen  MG Tabs   Last time this was given:  2 tablets on 3/22/2017  1:32 PM   Commonly known as:  NORCO        Take 2 tablets by mouth every 6 (six) hours as needed for Pain.     Quantity:  40 tablet   Refills:  0       OxyCODONE H known as:  METROGEL        Apply bid to affected area    Quantity:  55 g   Refills:  5       MULTIVITAMIN ADULTS 50+ Tabs        Take 1 tablet by mouth daily.     Refills:  0       NON FORMULARY        Take 2 capsules by mouth 3 (three) times daily with jesica Luis Daniel  3/22/2017  8:38 PM

## 2017-03-28 ENCOUNTER — TELEPHONE (OUTPATIENT)
Dept: ORTHOPEDICS CLINIC | Facility: CLINIC | Age: 62
End: 2017-03-28

## 2017-03-28 NOTE — TELEPHONE ENCOUNTER
Spoke to pt. Pt states she has been doing home PT since surgery. Pt states she just wants to get outpatient PT order so that she can go ahead and schedule appt for PT so there is not any gaps in her therapy Tx.  Informed pt that D typically places oupatie

## 2017-03-30 ENCOUNTER — TELEPHONE (OUTPATIENT)
Dept: INTERNAL MEDICINE CLINIC | Facility: CLINIC | Age: 62
End: 2017-03-30

## 2017-03-30 ENCOUNTER — TELEPHONE (OUTPATIENT)
Dept: ORTHOPEDICS CLINIC | Facility: CLINIC | Age: 62
End: 2017-03-30

## 2017-03-30 RX ORDER — HYDROCODONE BITARTRATE AND ACETAMINOPHEN 10; 325 MG/1; MG/1
1 TABLET ORAL EVERY 6 HOURS PRN
Qty: 40 TABLET | Refills: 0 | Status: SHIPPED | OUTPATIENT
Start: 2017-03-30 | End: 2017-04-07

## 2017-03-30 NOTE — TELEPHONE ENCOUNTER
Patient has returned call and one of her specialist's colleagues will take care of the request in his absence. Please disregard.

## 2017-03-30 NOTE — TELEPHONE ENCOUNTER
pt called requesting Norco, needs this to help manage the pain while in PT,  she will be out of meds soon, can another Dr Brandon Garrido for her, she understands Dr. Edson Staton is out this week. Please advise.

## 2017-03-30 NOTE — TELEPHONE ENCOUNTER
Patient reports that her orthopedic had provided Rx for her knee surgery, but he is out of the office. She is nearly out of medication and was told by PT that her PCP would be able to refill. However, Dr Mary Luo is also out of the office.     Current Outpat

## 2017-04-04 ENCOUNTER — HOSPITAL ENCOUNTER (OUTPATIENT)
Dept: GENERAL RADIOLOGY | Facility: HOSPITAL | Age: 62
Discharge: HOME | End: 2017-04-04
Attending: ORTHOPAEDIC SURGERY
Payer: COMMERCIAL

## 2017-04-04 ENCOUNTER — OFFICE VISIT (OUTPATIENT)
Dept: ORTHOPEDICS CLINIC | Facility: CLINIC | Age: 62
End: 2017-04-04

## 2017-04-04 DIAGNOSIS — Z47.89 ORTHOPEDIC AFTERCARE: ICD-10-CM

## 2017-04-04 DIAGNOSIS — M17.11 PRIMARY OSTEOARTHRITIS OF RIGHT KNEE: Primary | ICD-10-CM

## 2017-04-04 PROCEDURE — 99212 OFFICE O/P EST SF 10 MIN: CPT | Performed by: ORTHOPAEDIC SURGERY

## 2017-04-04 PROCEDURE — 99024 POSTOP FOLLOW-UP VISIT: CPT | Performed by: ORTHOPAEDIC SURGERY

## 2017-04-04 PROCEDURE — 73562 X-RAY EXAM OF KNEE 3: CPT

## 2017-04-04 RX ORDER — HYDROCODONE BITARTRATE AND ACETAMINOPHEN 5; 325 MG/1; MG/1
2 TABLET ORAL EVERY 6 HOURS PRN
Qty: 40 TABLET | Refills: 0 | Status: SHIPPED | OUTPATIENT
Start: 2017-04-04 | End: 2017-04-07

## 2017-04-06 ENCOUNTER — OFFICE VISIT (OUTPATIENT)
Dept: PHYSICAL THERAPY | Facility: HOSPITAL | Age: 62
End: 2017-04-06
Attending: ORTHOPAEDIC SURGERY
Payer: COMMERCIAL

## 2017-04-06 DIAGNOSIS — Z47.89 ORTHOPEDIC AFTERCARE: ICD-10-CM

## 2017-04-06 DIAGNOSIS — M17.11 PRIMARY OSTEOARTHRITIS OF RIGHT KNEE: Primary | ICD-10-CM

## 2017-04-06 PROCEDURE — 97161 PT EVAL LOW COMPLEX 20 MIN: CPT

## 2017-04-06 PROCEDURE — 97110 THERAPEUTIC EXERCISES: CPT

## 2017-04-06 NOTE — PROGRESS NOTES
LOWER EXTREMITY EVALUATION:   Referring Physician: Dr. Smiley Barnes  Date of Onset: 3/20/17 Date of Service: 4/6/2017   Diagnosis: Primary osteoarthritis of R knee; orthopedic aftercare   PATIENT SUMMARY:   Blayne Hernandes is a 64year old y/o female who pres for return to prior level of function.     Precautions:  None     OBJECTIVE:   Observation: Patient appears anxious, fidgeting/rocking throughout evaluation; pitting edema in peripatellar region  Gait: short step length, decreased pelvic rotation, lack of t 42/100    Patient was advised of these findings, precautions, and treatment options and has agreed to actively participate in planning and for this course of care.     Thank you for your referral. Please co-sign or sign and return this letter via fax as andrés

## 2017-04-07 ENCOUNTER — OFFICE VISIT (OUTPATIENT)
Dept: INTERNAL MEDICINE CLINIC | Facility: CLINIC | Age: 62
End: 2017-04-07

## 2017-04-07 VITALS
HEIGHT: 67 IN | DIASTOLIC BLOOD PRESSURE: 84 MMHG | HEART RATE: 75 BPM | SYSTOLIC BLOOD PRESSURE: 131 MMHG | WEIGHT: 154 LBS | TEMPERATURE: 98 F | BODY MASS INDEX: 24.17 KG/M2

## 2017-04-07 DIAGNOSIS — M17.11 PRIMARY OSTEOARTHRITIS OF RIGHT KNEE: Primary | ICD-10-CM

## 2017-04-07 DIAGNOSIS — Z47.1 AFTERCARE FOLLOWING LEFT ELBOW JOINT REPLACEMENT SURGERY: ICD-10-CM

## 2017-04-07 DIAGNOSIS — Z96.622 AFTERCARE FOLLOWING LEFT ELBOW JOINT REPLACEMENT SURGERY: ICD-10-CM

## 2017-04-07 PROCEDURE — 99213 OFFICE O/P EST LOW 20 MIN: CPT | Performed by: INTERNAL MEDICINE

## 2017-04-07 PROCEDURE — 99212 OFFICE O/P EST SF 10 MIN: CPT | Performed by: INTERNAL MEDICINE

## 2017-04-07 RX ORDER — HYDROCODONE BITARTRATE AND ACETAMINOPHEN 5; 325 MG/1; MG/1
1 TABLET ORAL EVERY 6 HOURS PRN
Qty: 60 TABLET | Refills: 0 | Status: SHIPPED | OUTPATIENT
Start: 2017-04-07 | End: 2017-07-21

## 2017-04-09 NOTE — PROGRESS NOTES
HPI:    Patient ID: Mariama Garcia is a 64year old female.     HPI    3 wks post hosp    Undergoing physical therapy  Still limping but full weight bearing  Off xarelty  Still having right knee pain 7/10 takin norco 5 mg   2 po   Total of 8 pills per day Rfl: 0   Cholecalciferol (VITAMIN D3) 1000 units Oral Cap Take 5,000 Units by mouth daily. Disp:  Rfl:    ARMOUR THYROID 15 MG Oral Tab TK ONE T PO QAM ONE HOUR BEFORE BREAKFAST.  Disp:  Rfl: 1   Atorvastatin Calcium 40 MG Oral Tab Take 1 tablet (40 mg tota Myocardial Infarction[other] [OTHER] Brother    • Psychiatric Father      alzheimer's   • Heart Disorder Mother    • Cancer Mother      lung      Social History:   Smoking Status: Never Smoker                      Smokeless Status: Never Used E97.404) Aftercare following left elbow joint replacement surgery  Plan:  As above   Follow upwith orhto  Pain controll    Patient voiced understanding  and agrees with plan        No orders of the defined types were placed in this encounter.        Meds Th

## 2017-04-10 ENCOUNTER — TELEPHONE (OUTPATIENT)
Dept: ORTHOPEDICS CLINIC | Facility: CLINIC | Age: 62
End: 2017-04-10

## 2017-04-10 ENCOUNTER — OFFICE VISIT (OUTPATIENT)
Dept: PHYSICAL THERAPY | Facility: HOSPITAL | Age: 62
End: 2017-04-10
Attending: ORTHOPAEDIC SURGERY
Payer: COMMERCIAL

## 2017-04-10 PROCEDURE — 97110 THERAPEUTIC EXERCISES: CPT

## 2017-04-10 PROCEDURE — 97140 MANUAL THERAPY 1/> REGIONS: CPT

## 2017-04-10 NOTE — TELEPHONE ENCOUNTER
Pt states she has chronic R knee pain, states her knee feels warm to the touch, concerned it might be an infection, states she believes she has built up tolerance to pain rx and is asking if there is another way to manage her pain. Pls call thank you.

## 2017-04-10 NOTE — TELEPHONE ENCOUNTER
Called pt and she has c/o constant pain 5/10 and she feels like the Norco isn't helping as much as it was before. She is taking Norco 1-2 tabs Q 6hrs prn pain. She states the knee has a lot of swelling and feels hot to touch, with minor redness.  She denies

## 2017-04-10 NOTE — TELEPHONE ENCOUNTER
Patient can come in tomorrow for evaluation. Patient was given Norco 5 mg at last visit. If patient would like stronger pain medication, we are able to provide Norco 10.

## 2017-04-10 NOTE — TELEPHONE ENCOUNTER
S/w pt and informed her per [facility] message. Offered appt on 4/11/17 @ 1pm. She verbalized understanding.

## 2017-04-10 NOTE — PROGRESS NOTES
Diagnosis: Primary osteoarthritis of R knee; orthopedic aftercare (3/20/17)  Authorized # of Visits:  2/12 ([employer] [place]/[facility] [id number] [facility])       Next MD visit: none scheduled  Fall Risk: standard         Precautions: n/a           Medication Eusebia

## 2017-04-11 ENCOUNTER — OFFICE VISIT (OUTPATIENT)
Dept: ORTHOPEDICS CLINIC | Facility: CLINIC | Age: 62
End: 2017-04-11

## 2017-04-11 DIAGNOSIS — M17.11 PRIMARY OSTEOARTHRITIS OF RIGHT KNEE: Primary | ICD-10-CM

## 2017-04-11 PROCEDURE — 99212 OFFICE O/P EST SF 10 MIN: CPT | Performed by: ORTHOPAEDIC SURGERY

## 2017-04-11 PROCEDURE — 99024 POSTOP FOLLOW-UP VISIT: CPT | Performed by: ORTHOPAEDIC SURGERY

## 2017-04-11 RX ORDER — CLINDAMYCIN HYDROCHLORIDE 150 MG/1
150 CAPSULE ORAL 3 TIMES DAILY
Qty: 30 CAPSULE | Refills: 0 | Status: SHIPPED | OUTPATIENT
Start: 2017-04-11 | End: 2017-07-24 | Stop reason: ALTCHOICE

## 2017-04-11 RX ORDER — HYDROCODONE BITARTRATE AND ACETAMINOPHEN 10; 325 MG/1; MG/1
1 TABLET ORAL EVERY 6 HOURS PRN
Qty: 40 TABLET | Refills: 0 | Status: SHIPPED | OUTPATIENT
Start: 2017-04-11 | End: 2017-07-21

## 2017-04-11 NOTE — PROGRESS NOTES
This is a pleasant 51-year-old female that is 3 weeks status post right total knee arthroplasty. The patient reports having episodes of night sweats. Patient also reports having increased pain about the right knee.   At the last visit, the patient was gordy

## 2017-04-12 ENCOUNTER — OFFICE VISIT (OUTPATIENT)
Dept: PHYSICAL THERAPY | Facility: HOSPITAL | Age: 62
End: 2017-04-12
Attending: ORTHOPAEDIC SURGERY
Payer: COMMERCIAL

## 2017-04-12 PROCEDURE — 97110 THERAPEUTIC EXERCISES: CPT

## 2017-04-12 NOTE — PROGRESS NOTES
Diagnosis: Primary osteoarthritis of R knee; orthopedic aftercare (3/20/17)  Authorized # of Visits:  3/12 ([employer] [place]/[facility] [address] [facility])       Next MD visit: 4/18/17  Fall Risk: standard         Precautions: n/a           Medication Changes si

## 2017-04-17 ENCOUNTER — OFFICE VISIT (OUTPATIENT)
Dept: PHYSICAL THERAPY | Facility: HOSPITAL | Age: 62
End: 2017-04-17
Attending: ORTHOPAEDIC SURGERY
Payer: COMMERCIAL

## 2017-04-17 PROCEDURE — 97110 THERAPEUTIC EXERCISES: CPT

## 2017-04-17 PROCEDURE — 97140 MANUAL THERAPY 1/> REGIONS: CPT

## 2017-04-17 NOTE — PROGRESS NOTES
Diagnosis: Primary osteoarthritis of R knee; orthopedic aftercare (3/20/17)  Authorized # of Visits:  4/12 ([employer] [place]/[facility] [address] [facility])       Next MD visit: 4/18/17  Fall Risk: standard         Precautions: n/a           Medication Changes si

## 2017-04-18 ENCOUNTER — OFFICE VISIT (OUTPATIENT)
Dept: ORTHOPEDICS CLINIC | Facility: CLINIC | Age: 62
End: 2017-04-18

## 2017-04-18 DIAGNOSIS — M17.11 PRIMARY OSTEOARTHRITIS OF RIGHT KNEE: Primary | ICD-10-CM

## 2017-04-18 DIAGNOSIS — Z47.89 ORTHOPEDIC AFTERCARE: ICD-10-CM

## 2017-04-18 PROCEDURE — 99212 OFFICE O/P EST SF 10 MIN: CPT | Performed by: ORTHOPAEDIC SURGERY

## 2017-04-18 PROCEDURE — 99024 POSTOP FOLLOW-UP VISIT: CPT | Performed by: ORTHOPAEDIC SURGERY

## 2017-04-18 RX ORDER — HYDROCODONE BITARTRATE AND ACETAMINOPHEN 10; 325 MG/1; MG/1
1 TABLET ORAL EVERY 6 HOURS PRN
Qty: 40 TABLET | Refills: 0 | Status: SHIPPED | OUTPATIENT
Start: 2017-04-18 | End: 2017-07-21

## 2017-04-18 NOTE — PROGRESS NOTES
This is a pleasant 60-year-old female that is 4 weeks status post right total knee arthroplasty. The patient returns today for wound check and for repeat evaluation. The patient reports that her pain has significantly improved with Norco 10 mg.   The minerva

## 2017-04-19 ENCOUNTER — OFFICE VISIT (OUTPATIENT)
Dept: PHYSICAL THERAPY | Facility: HOSPITAL | Age: 62
End: 2017-04-19
Attending: ORTHOPAEDIC SURGERY
Payer: COMMERCIAL

## 2017-04-19 PROCEDURE — 97530 THERAPEUTIC ACTIVITIES: CPT

## 2017-04-19 PROCEDURE — 97110 THERAPEUTIC EXERCISES: CPT

## 2017-04-19 PROCEDURE — 97140 MANUAL THERAPY 1/> REGIONS: CPT

## 2017-04-19 NOTE — PROGRESS NOTES
Diagnosis: Primary osteoarthritis of R knee; orthopedic aftercare (3/20/17)  Authorized # of Visits:  5/12 ([employer] [place]/[facility] [address] [facility])       Next MD visit: 5/16/17  Fall Risk: standard         Precautions: n/a           Medication Changes si

## 2017-04-24 ENCOUNTER — OFFICE VISIT (OUTPATIENT)
Dept: PHYSICAL THERAPY | Facility: HOSPITAL | Age: 62
End: 2017-04-24
Attending: ORTHOPAEDIC SURGERY
Payer: COMMERCIAL

## 2017-04-24 PROCEDURE — 97530 THERAPEUTIC ACTIVITIES: CPT

## 2017-04-24 PROCEDURE — 97110 THERAPEUTIC EXERCISES: CPT

## 2017-04-24 NOTE — PROGRESS NOTES
Diagnosis: Primary osteoarthritis of R knee; orthopedic aftercare (3/20/17)  Authorized # of Visits:  6/12 ([employer] [place]/[facility] [address] [facility])       Next MD visit: 5/16/17  Fall Risk: standard         Precautions: n/a           Medication Changes si

## 2017-04-26 ENCOUNTER — OFFICE VISIT (OUTPATIENT)
Dept: PHYSICAL THERAPY | Facility: HOSPITAL | Age: 62
End: 2017-04-26
Attending: ORTHOPAEDIC SURGERY
Payer: COMMERCIAL

## 2017-04-26 PROCEDURE — 97110 THERAPEUTIC EXERCISES: CPT

## 2017-04-26 PROCEDURE — 97530 THERAPEUTIC ACTIVITIES: CPT

## 2017-04-26 NOTE — PROGRESS NOTES
Diagnosis: Primary osteoarthritis of R knee; orthopedic aftercare (3/20/17)  Authorized # of Visits:  7/12 ([employer] [place]/[facility] [address] [facility])       Next MD visit: 5/16/17  Fall Risk: standard         Precautions: n/a           Medication Changes si

## 2017-05-01 ENCOUNTER — OFFICE VISIT (OUTPATIENT)
Dept: PHYSICAL THERAPY | Facility: HOSPITAL | Age: 62
End: 2017-05-01
Attending: ORTHOPAEDIC SURGERY
Payer: COMMERCIAL

## 2017-05-01 PROCEDURE — 97110 THERAPEUTIC EXERCISES: CPT

## 2017-05-01 PROCEDURE — 97530 THERAPEUTIC ACTIVITIES: CPT

## 2017-05-01 NOTE — PROGRESS NOTES
Diagnosis: Primary osteoarthritis of R knee; orthopedic aftercare (3/20/17)  Authorized # of Visits:  8/12 ([place] O/[facility] [address] [facility])       Next MD visit: 5/16/17  Fall Risk: standard         Precautions: n/a           Medication Changes si

## 2017-05-05 ENCOUNTER — OFFICE VISIT (OUTPATIENT)
Dept: PHYSICAL THERAPY | Facility: HOSPITAL | Age: 62
End: 2017-05-05
Payer: COMMERCIAL

## 2017-05-05 PROCEDURE — 97110 THERAPEUTIC EXERCISES: CPT

## 2017-05-05 PROCEDURE — 97530 THERAPEUTIC ACTIVITIES: CPT

## 2017-05-05 NOTE — PROGRESS NOTES
Diagnosis: Primary osteoarthritis of R knee; orthopedic aftercare (3/20/17)  Authorized # of Visits:  9/12 ([place]/[facility] [address] [facility])       Next MD visit: 5/16/17  Fall Risk: standard         Precautions: n/a           Medication Changes si

## 2017-05-08 ENCOUNTER — APPOINTMENT (OUTPATIENT)
Dept: PHYSICAL THERAPY | Facility: HOSPITAL | Age: 62
End: 2017-05-08
Payer: COMMERCIAL

## 2017-05-09 ENCOUNTER — TELEPHONE (OUTPATIENT)
Dept: ORTHOPEDICS CLINIC | Facility: CLINIC | Age: 62
End: 2017-05-09

## 2017-05-09 NOTE — TELEPHONE ENCOUNTER
Dr. Marilyn Islas, please see message below. Pt had Synvisc injection to her left knee on 03/03/17. LOV for the left knee was 03/14/17. Please advise.

## 2017-05-11 ENCOUNTER — TELEPHONE (OUTPATIENT)
Dept: INTERNAL MEDICINE CLINIC | Facility: CLINIC | Age: 62
End: 2017-05-11

## 2017-05-11 DIAGNOSIS — Z23 NEED FOR MMR VACCINE: Primary | ICD-10-CM

## 2017-05-12 ENCOUNTER — OFFICE VISIT (OUTPATIENT)
Dept: PHYSICAL THERAPY | Facility: HOSPITAL | Age: 62
End: 2017-05-12
Payer: COMMERCIAL

## 2017-05-12 PROCEDURE — 97530 THERAPEUTIC ACTIVITIES: CPT

## 2017-05-12 PROCEDURE — 97110 THERAPEUTIC EXERCISES: CPT

## 2017-05-12 NOTE — PROGRESS NOTES
Diagnosis: Primary osteoarthritis of R knee; orthopedic aftercare (3/20/17)  Authorized # of Visits:  10/12 ([place]/[facility] [address] [facility])       Next MD visit: 5/16/17  Fall Risk: standard         Precautions: n/a           Medication Changes s

## 2017-05-12 NOTE — TELEPHONE ENCOUNTER
Pt is requesting MMR and Tdap vaccine for work. Her last Tdap was in 2013 her next one isn't due until 2023. I will pend the order for her MMR. Please sign the order if you see fit.  Also, let me know if you want to order a Tdap even though she isn't due ye

## 2017-05-15 ENCOUNTER — OFFICE VISIT (OUTPATIENT)
Dept: PHYSICAL THERAPY | Facility: HOSPITAL | Age: 62
End: 2017-05-15
Payer: COMMERCIAL

## 2017-05-15 PROCEDURE — 97530 THERAPEUTIC ACTIVITIES: CPT

## 2017-05-15 PROCEDURE — 97110 THERAPEUTIC EXERCISES: CPT

## 2017-05-15 NOTE — PROGRESS NOTES
Diagnosis: Primary osteoarthritis of R knee; orthopedic aftercare (3/20/17)  Authorized # of Visits:  11/12 ([place]/[facility] [address] [facility])       Next MD visit: 5/16/17  Fall Risk: standard         Precautions: n/a           Medication Changes s

## 2017-05-15 NOTE — PROGRESS NOTES
Patient Name: Tylor Garcia, : 1955, MRN: [id number]   Date:  5/15/2017  Referring Physician:  Sydnee Yee    Diagnosis: Primary osteoarthritis of R knee; orthopedic aftercare; s/p TKA    Discharge Summary  Pt has attended 11 visits in

## 2017-05-16 ENCOUNTER — HOSPITAL ENCOUNTER (OUTPATIENT)
Dept: GENERAL RADIOLOGY | Facility: HOSPITAL | Age: 62
Discharge: HOME | End: 2017-05-16
Attending: ORTHOPAEDIC SURGERY
Payer: COMMERCIAL

## 2017-05-16 ENCOUNTER — NURSE ONLY (OUTPATIENT)
Dept: FAMILY MEDICINE CLINIC | Facility: CLINIC | Age: 62
End: 2017-05-16

## 2017-05-16 ENCOUNTER — OFFICE VISIT (OUTPATIENT)
Dept: ORTHOPEDICS CLINIC | Facility: CLINIC | Age: 62
End: 2017-05-16

## 2017-05-16 DIAGNOSIS — Z47.89 ORTHOPEDIC AFTERCARE: ICD-10-CM

## 2017-05-16 DIAGNOSIS — Z23 IMMUNIZATION DUE: Primary | ICD-10-CM

## 2017-05-16 DIAGNOSIS — M17.11 PRIMARY OSTEOARTHRITIS OF RIGHT KNEE: ICD-10-CM

## 2017-05-16 DIAGNOSIS — M17.12 PRIMARY OSTEOARTHRITIS OF LEFT KNEE: Primary | ICD-10-CM

## 2017-05-16 PROCEDURE — 73562 X-RAY EXAM OF KNEE 3: CPT | Performed by: ORTHOPAEDIC SURGERY

## 2017-05-16 PROCEDURE — 99212 OFFICE O/P EST SF 10 MIN: CPT | Performed by: ORTHOPAEDIC SURGERY

## 2017-05-16 PROCEDURE — 90471 IMMUNIZATION ADMIN: CPT | Performed by: INTERNAL MEDICINE

## 2017-05-16 PROCEDURE — 90707 MMR VACCINE SC: CPT | Performed by: FAMILY MEDICINE

## 2017-05-16 PROCEDURE — 99213 OFFICE O/P EST LOW 20 MIN: CPT | Performed by: ORTHOPAEDIC SURGERY

## 2017-05-16 NOTE — PROGRESS NOTES
5/16/2017  Aubrey Lawton  65/1955  64year old   female  Massimo Almonte MD    HPI:   Patient presents with:  Post-Op: s/p Right TKA 8 weeks f/u - states she is good, has some pain by the sx site where the shorts are rubbing on, has cora noguera o arthroplasty.     ASSESSMENT AND PLAN:   Primary osteoarthritis of left knee  (primary encounter diagnosis)  Primary osteoarthritis of right knee  Orthopedic aftercare    This is a pleasant 51-year-old female that is 7 weeks status post a right total knee a

## 2017-06-14 ENCOUNTER — HOSPITAL ENCOUNTER (OUTPATIENT)
Dept: MRI IMAGING | Facility: HOSPITAL | Age: 62
Discharge: HOME | End: 2017-06-14
Attending: ORTHOPAEDIC SURGERY
Payer: COMMERCIAL

## 2017-06-14 DIAGNOSIS — M17.12 PRIMARY OSTEOARTHRITIS OF LEFT KNEE: ICD-10-CM

## 2017-06-14 PROCEDURE — 73721 MRI JNT OF LWR EXTRE W/O DYE: CPT | Performed by: ORTHOPAEDIC SURGERY

## 2017-06-29 ENCOUNTER — TELEPHONE (OUTPATIENT)
Dept: ORTHOPEDICS CLINIC | Facility: CLINIC | Age: 62
End: 2017-06-29

## 2017-06-30 ENCOUNTER — TELEPHONE (OUTPATIENT)
Dept: ORTHOPEDICS CLINIC | Facility: CLINIC | Age: 62
End: 2017-06-30

## 2017-07-19 ENCOUNTER — TELEPHONE (OUTPATIENT)
Dept: ORTHOPEDICS CLINIC | Facility: CLINIC | Age: 62
End: 2017-07-19

## 2017-07-19 NOTE — TELEPHONE ENCOUNTER
[facility] for surgery authoziation  7-31-17  In patient   Dr. Tejas Saxena  Left total knee  [facility]    Authorization for managed care dept

## 2017-07-21 ENCOUNTER — LAB ENCOUNTER (OUTPATIENT)
Dept: LAB | Age: 62
End: 2017-07-21
Attending: INTERNAL MEDICINE
Payer: COMMERCIAL

## 2017-07-21 ENCOUNTER — OFFICE VISIT (OUTPATIENT)
Dept: INTERNAL MEDICINE CLINIC | Facility: CLINIC | Age: 62
End: 2017-07-21

## 2017-07-21 VITALS
TEMPERATURE: 98 F | SYSTOLIC BLOOD PRESSURE: 117 MMHG | BODY MASS INDEX: 23.58 KG/M2 | HEART RATE: 65 BPM | DIASTOLIC BLOOD PRESSURE: 80 MMHG | HEIGHT: 67.5 IN | WEIGHT: 152 LBS

## 2017-07-21 DIAGNOSIS — R82.90 ABNORMAL URINALYSIS: ICD-10-CM

## 2017-07-21 DIAGNOSIS — E78.5 HYPERLIPIDEMIA, UNSPECIFIED HYPERLIPIDEMIA TYPE: ICD-10-CM

## 2017-07-21 DIAGNOSIS — Z01.818 PREOP EXAM FOR INTERNAL MEDICINE: ICD-10-CM

## 2017-07-21 DIAGNOSIS — R94.31 ABNORMAL EKG: Primary | ICD-10-CM

## 2017-07-21 DIAGNOSIS — Z01.818 PRE-OP EVALUATION: Primary | ICD-10-CM

## 2017-07-21 LAB
ALBUMIN SERPL BCP-MCNC: 4.3 G/DL (ref 3.5–4.8)
ALBUMIN/GLOB SERPL: 1.7 {RATIO} (ref 1–2)
ALP SERPL-CCNC: 83 U/L (ref 32–100)
ALT SERPL-CCNC: 26 U/L (ref 14–54)
ANION GAP SERPL CALC-SCNC: 10 MMOL/L (ref 0–18)
ANTIBODY SCREEN: NEGATIVE
APTT PPP: 37.1 SECONDS (ref 23.2–35.3)
AST SERPL-CCNC: 27 U/L (ref 15–41)
BACTERIA UR QL AUTO: NEGATIVE /HPF
BASOPHILS # BLD: 0 K/UL (ref 0–0.2)
BASOPHILS NFR BLD: 1 %
BILIRUB SERPL-MCNC: 0.8 MG/DL (ref 0.3–1.2)
BILIRUB UR QL: NEGATIVE
BUN SERPL-MCNC: 18 MG/DL (ref 8–20)
BUN/CREAT SERPL: 21.4 (ref 10–20)
CALCIUM SERPL-MCNC: 10 MG/DL (ref 8.5–10.5)
CHLORIDE SERPL-SCNC: 101 MMOL/L (ref 95–110)
CLARITY UR: CLEAR
CO2 SERPL-SCNC: 28 MMOL/L (ref 22–32)
COLOR UR: YELLOW
CREAT SERPL-MCNC: 0.84 MG/DL (ref 0.5–1.5)
EOSINOPHIL # BLD: 0.2 K/UL (ref 0–0.7)
EOSINOPHIL NFR BLD: 3 %
ERYTHROCYTE [DISTWIDTH] IN BLOOD BY AUTOMATED COUNT: 13.8 % (ref 11–15)
GLOBULIN PLAS-MCNC: 2.6 G/DL (ref 2.5–3.7)
GLUCOSE SERPL-MCNC: 93 MG/DL (ref 70–99)
GLUCOSE UR-MCNC: NEGATIVE MG/DL
HCT VFR BLD AUTO: 41.4 % (ref 35–48)
HGB BLD-MCNC: 14 G/DL (ref 12–16)
HGB UR QL STRIP.AUTO: NEGATIVE
INR BLD: 1 (ref 0.9–1.2)
KETONES UR-MCNC: NEGATIVE MG/DL
LYMPHOCYTES # BLD: 1.5 K/UL (ref 1–4)
LYMPHOCYTES NFR BLD: 28 %
MCH RBC QN AUTO: 31 PG (ref 27–32)
MCHC RBC AUTO-ENTMCNC: 33.8 G/DL (ref 32–37)
MCV RBC AUTO: 91.8 FL (ref 80–100)
MONOCYTES # BLD: 0.5 K/UL (ref 0–1)
MONOCYTES NFR BLD: 10 %
NEUTROPHILS # BLD AUTO: 3.1 K/UL (ref 1.8–7.7)
NEUTROPHILS NFR BLD: 58 %
NITRITE UR QL STRIP.AUTO: NEGATIVE
OSMOLALITY UR CALC.SUM OF ELEC: 290 MOSM/KG (ref 275–295)
PH UR: 6 [PH] (ref 5–8)
PLATELET # BLD AUTO: 215 K/UL (ref 140–400)
PMV BLD AUTO: 7.2 FL (ref 7.4–10.3)
POTASSIUM SERPL-SCNC: 4.3 MMOL/L (ref 3.3–5.1)
PROT SERPL-MCNC: 6.9 G/DL (ref 5.9–8.4)
PROT UR-MCNC: NEGATIVE MG/DL
PROTHROMBIN TIME: 13.2 SECONDS (ref 11.8–14.5)
RBC # BLD AUTO: 4.51 M/UL (ref 3.7–5.4)
RBC #/AREA URNS AUTO: <1 /HPF
RH BLOOD TYPE: POSITIVE
SODIUM SERPL-SCNC: 139 MMOL/L (ref 136–144)
SP GR UR STRIP: 1.01 (ref 1–1.03)
UROBILINOGEN UR STRIP-ACNC: <2
VIT C UR-MCNC: 20 MG/DL
WBC # BLD AUTO: 5.4 K/UL (ref 4–11)
WBC #/AREA URNS AUTO: 16 /HPF

## 2017-07-21 PROCEDURE — 86850 RBC ANTIBODY SCREEN: CPT

## 2017-07-21 PROCEDURE — 87641 MR-STAPH DNA AMP PROBE: CPT

## 2017-07-21 PROCEDURE — 99244 OFF/OP CNSLTJ NEW/EST MOD 40: CPT | Performed by: INTERNAL MEDICINE

## 2017-07-21 PROCEDURE — 86901 BLOOD TYPING SEROLOGIC RH(D): CPT

## 2017-07-21 PROCEDURE — 85025 COMPLETE CBC W/AUTO DIFF WBC: CPT

## 2017-07-21 PROCEDURE — 99212 OFFICE O/P EST SF 10 MIN: CPT | Performed by: INTERNAL MEDICINE

## 2017-07-21 PROCEDURE — 85610 PROTHROMBIN TIME: CPT

## 2017-07-21 PROCEDURE — 81001 URINALYSIS AUTO W/SCOPE: CPT

## 2017-07-21 PROCEDURE — 36415 COLL VENOUS BLD VENIPUNCTURE: CPT

## 2017-07-21 PROCEDURE — 93005 ELECTROCARDIOGRAM TRACING: CPT

## 2017-07-21 PROCEDURE — 86900 BLOOD TYPING SEROLOGIC ABO: CPT

## 2017-07-21 PROCEDURE — 93010 ELECTROCARDIOGRAM REPORT: CPT | Performed by: INTERNAL MEDICINE

## 2017-07-21 PROCEDURE — 85730 THROMBOPLASTIN TIME PARTIAL: CPT

## 2017-07-21 PROCEDURE — 80053 COMPREHEN METABOLIC PANEL: CPT

## 2017-07-22 LAB — MRSA DNA SPEC QL NAA+PROBE: NEGATIVE

## 2017-07-23 ENCOUNTER — TELEPHONE (OUTPATIENT)
Dept: INTERNAL MEDICINE CLINIC | Facility: CLINIC | Age: 62
End: 2017-07-23

## 2017-07-23 NOTE — PROGRESS NOTES
HPI:    Patient ID: Blayne Hernandes is a 64year old female.     HPI    PReOP clearance  Surgeon Role   Lucinda Horn MD Primary    Procedure Laterality Anesthesia   left total knee arthroplasty          DATE of SURGERY 7/31/17          PROCEDURE: behavioral problems. Current Outpatient Prescriptions:  hydrocortisone 2.5 % External Cream Apply 1 Application topically 2 (two) times daily.  Disp: 28 g Rfl: 1   triamcinolone acetonide 0.1 % External Ointment Apply 1 Application topically 2 (tw Relation Age of Onset   • Alzheimer's Disease[other] [OTHER]       Family H   • Heart Disease       Family H/O   • Myocardial Infarction[other] [OTHER] Brother    • Psychiatric Father      alzheimer's   • Heart Disorder Mother    • Cancer Mother      lung mmol/L 28   BUN      8 - 20 mg/dL 18   CREATININE      0.50 - 1.50 mg/dL 0.84   CALCIUM      8.5 - 10.5 mg/dL 10.0   ALT (SGPT)      14 - 54 U/L 26   AST (SGOT)      15 - 41 U/L 27   ALKALINE PHOSPHATASE      32 - 100 U/L 83   Total Bilirubin      0.3 - 1. None Seen /HPF Negative   PT      11.8 - 14.5 seconds 13.2   INR      0.9 - 1.2 1.0   ABO GROUPING       O   RH FACTOR       Positive   MRSA SCREEN BY PCR      Negative Negative   APTT      23.2 - 35.3 seconds 37.1 (H)   ANTIBODY SCREEN       Negative

## 2017-07-23 NOTE — TELEPHONE ENCOUNTER
Having knee surgery 7/31  Abn EKG  Flat T waves new  V2      I ordered cardiac echo  Call pt to schedule  Abn Ua   I ordered repeat Ua with reflex   Call pt to have  Ua  Go to the lab    Thanks for your help

## 2017-07-24 ENCOUNTER — APPOINTMENT (OUTPATIENT)
Dept: LAB | Age: 62
End: 2017-07-24
Attending: INTERNAL MEDICINE
Payer: COMMERCIAL

## 2017-07-24 ENCOUNTER — TELEPHONE (OUTPATIENT)
Dept: INTERNAL MEDICINE CLINIC | Facility: CLINIC | Age: 62
End: 2017-07-24

## 2017-07-24 DIAGNOSIS — R82.90 ABNORMAL URINALYSIS: ICD-10-CM

## 2017-07-24 LAB
BACTERIA UR QL AUTO: NEGATIVE /HPF
BILIRUB UR QL: NEGATIVE
CLARITY UR: CLEAR
COLOR UR: YELLOW
GLUCOSE UR-MCNC: NEGATIVE MG/DL
HGB UR QL STRIP.AUTO: NEGATIVE
KETONES UR-MCNC: NEGATIVE MG/DL
LEUKOCYTE ESTERASE UR QL STRIP.AUTO: NEGATIVE
NITRITE UR QL STRIP.AUTO: NEGATIVE
PH UR: 6 [PH] (ref 5–8)
PROT UR-MCNC: NEGATIVE MG/DL
RBC #/AREA URNS AUTO: <1 /HPF
SP GR UR STRIP: 1.01 (ref 1–1.03)
UROBILINOGEN UR STRIP-ACNC: <2
VIT C UR-MCNC: 20 MG/DL
WBC #/AREA URNS AUTO: 1 /HPF

## 2017-07-24 PROCEDURE — 81003 URINALYSIS AUTO W/O SCOPE: CPT

## 2017-07-24 NOTE — TELEPHONE ENCOUNTER
Spoke with patient (name and  verified), reviewed information, patient verbalized understanding and agrees with plan. Spoke with patient (identified name and ), results reviewed and agrees with plan.    Contact info for cardiology scheduling [phone]

## 2017-07-24 NOTE — TELEPHONE ENCOUNTER
Betina Alvarado from lab calling to let Dr Fabio Kruger know stat UA results are in epic her review.     Message routed to Dr Fabio Kruger

## 2017-07-26 ENCOUNTER — HOSPITAL ENCOUNTER (OUTPATIENT)
Dept: CARDIOLOGY CLINIC | Age: 62
Discharge: HOME | End: 2017-07-26
Attending: INTERNAL MEDICINE
Payer: COMMERCIAL

## 2017-07-26 ENCOUNTER — TELEPHONE (OUTPATIENT)
Dept: INTERNAL MEDICINE CLINIC | Facility: CLINIC | Age: 62
End: 2017-07-26

## 2017-07-26 DIAGNOSIS — R94.31 ABNORMAL EKG: ICD-10-CM

## 2017-07-26 PROCEDURE — 93306 TTE W/DOPPLER COMPLETE: CPT | Performed by: INTERNAL MEDICINE

## 2017-07-26 NOTE — TELEPHONE ENCOUNTER
Addendum to Pre OP clearance    Laura is cleared to proceed with surgery as planned        Repeat urinalysis is normal  Cardiac Echo is normal  Ordering MD: Lisa     Interpreting physician:           Beatriz Forbes MD  Sonographer: Faraz Cruz

## 2017-07-31 ENCOUNTER — APPOINTMENT (OUTPATIENT)
Dept: GENERAL RADIOLOGY | Facility: HOSPITAL | Age: 62
DRG: 470 | End: 2017-07-31
Attending: PHYSICIAN ASSISTANT
Payer: COMMERCIAL

## 2017-07-31 ENCOUNTER — ANESTHESIA (OUTPATIENT)
Dept: SURGERY | Facility: HOSPITAL | Age: 62
DRG: 470 | End: 2017-07-31
Payer: COMMERCIAL

## 2017-07-31 ENCOUNTER — HOSPITAL ENCOUNTER (INPATIENT)
Facility: HOSPITAL | Age: 62
LOS: 2 days | Discharge: HOME | DRG: 470 | End: 2017-08-02
Attending: ORTHOPAEDIC SURGERY | Admitting: ORTHOPAEDIC SURGERY
Payer: COMMERCIAL

## 2017-07-31 ENCOUNTER — ANESTHESIA EVENT (OUTPATIENT)
Dept: SURGERY | Facility: HOSPITAL | Age: 62
DRG: 470 | End: 2017-07-31
Payer: COMMERCIAL

## 2017-07-31 ENCOUNTER — SURGERY (OUTPATIENT)
Age: 62
End: 2017-07-31

## 2017-07-31 DIAGNOSIS — M17.12 PRIMARY OSTEOARTHRITIS OF LEFT KNEE: Primary | ICD-10-CM

## 2017-07-31 PROCEDURE — 0SRD0J9 REPLACEMENT OF LEFT KNEE JOINT WITH SYNTHETIC SUBSTITUTE, CEMENTED, OPEN APPROACH: ICD-10-PCS | Performed by: ORTHOPAEDIC SURGERY

## 2017-07-31 PROCEDURE — 73560 X-RAY EXAM OF KNEE 1 OR 2: CPT | Performed by: PHYSICIAN ASSISTANT

## 2017-07-31 PROCEDURE — 3E0T3CZ INTRODUCTION OF REGIONAL ANESTHETIC INTO PERIPHERAL NERVES AND PLEXI, PERCUTANEOUS APPROACH: ICD-10-PCS | Performed by: ORTHOPAEDIC SURGERY

## 2017-07-31 PROCEDURE — 99232 SBSQ HOSP IP/OBS MODERATE 35: CPT | Performed by: HOSPITALIST

## 2017-07-31 DEVICE — IMPLANTABLE DEVICE: Type: IMPLANTABLE DEVICE | Site: KNEE | Status: FUNCTIONAL

## 2017-07-31 DEVICE — ALL POLY PAT VE 35MM: Type: IMPLANTABLE DEVICE | Site: KNEE | Status: FUNCTIONAL

## 2017-07-31 DEVICE — PSN TIB STM 5 DEG SZ E L: Type: IMPLANTABLE DEVICE | Site: KNEE | Status: FUNCTIONAL

## 2017-07-31 DEVICE — CEMENT BONE ZIM PALICOS R: Type: IMPLANTABLE DEVICE | Site: KNEE | Status: FUNCTIONAL

## 2017-07-31 DEVICE — PSN FEM PS CMT CCR STD SZ8 L: Type: IMPLANTABLE DEVICE | Site: KNEE | Status: FUNCTIONAL

## 2017-07-31 RX ORDER — ACETAMINOPHEN 325 MG/1
650 TABLET ORAL ONCE
Status: COMPLETED | OUTPATIENT
Start: 2017-07-31 | End: 2017-07-31

## 2017-07-31 RX ORDER — ONDANSETRON 2 MG/ML
INJECTION INTRAMUSCULAR; INTRAVENOUS AS NEEDED
Status: DISCONTINUED | OUTPATIENT
Start: 2017-07-31 | End: 2017-07-31 | Stop reason: SURG

## 2017-07-31 RX ORDER — DEXAMETHASONE SODIUM PHOSPHATE 4 MG/ML
VIAL (ML) INJECTION AS NEEDED
Status: DISCONTINUED | OUTPATIENT
Start: 2017-07-31 | End: 2017-07-31 | Stop reason: SURG

## 2017-07-31 RX ORDER — SODIUM CHLORIDE 0.9 % (FLUSH) 0.9 %
10 SYRINGE (ML) INJECTION AS NEEDED
Status: DISCONTINUED | OUTPATIENT
Start: 2017-07-31 | End: 2017-08-02

## 2017-07-31 RX ORDER — HYDROMORPHONE HYDROCHLORIDE 1 MG/ML
0.6 INJECTION, SOLUTION INTRAMUSCULAR; INTRAVENOUS; SUBCUTANEOUS EVERY 5 MIN PRN
Status: DISCONTINUED | OUTPATIENT
Start: 2017-07-31 | End: 2017-07-31 | Stop reason: HOSPADM

## 2017-07-31 RX ORDER — ONDANSETRON 2 MG/ML
4 INJECTION INTRAMUSCULAR; INTRAVENOUS ONCE AS NEEDED
Status: DISCONTINUED | OUTPATIENT
Start: 2017-07-31 | End: 2017-07-31 | Stop reason: HOSPADM

## 2017-07-31 RX ORDER — SODIUM CHLORIDE, SODIUM LACTATE, POTASSIUM CHLORIDE, CALCIUM CHLORIDE 600; 310; 30; 20 MG/100ML; MG/100ML; MG/100ML; MG/100ML
INJECTION, SOLUTION INTRAVENOUS CONTINUOUS
Status: DISCONTINUED | OUTPATIENT
Start: 2017-07-31 | End: 2017-08-02

## 2017-07-31 RX ORDER — TRANEXAMIC ACID 100 MG/ML
INJECTION, SOLUTION INTRAVENOUS AS NEEDED
Status: DISCONTINUED | OUTPATIENT
Start: 2017-07-31 | End: 2017-07-31 | Stop reason: SURG

## 2017-07-31 RX ORDER — FAMOTIDINE 20 MG/1
20 TABLET ORAL ONCE
Status: DISCONTINUED | OUTPATIENT
Start: 2017-07-31 | End: 2017-07-31 | Stop reason: HOSPADM

## 2017-07-31 RX ORDER — HYDROCODONE BITARTRATE AND ACETAMINOPHEN 10; 325 MG/1; MG/1
2 TABLET ORAL EVERY 6 HOURS PRN
Status: DISCONTINUED | OUTPATIENT
Start: 2017-07-31 | End: 2017-08-02

## 2017-07-31 RX ORDER — BISACODYL 10 MG
10 SUPPOSITORY, RECTAL RECTAL
Status: DISCONTINUED | OUTPATIENT
Start: 2017-07-31 | End: 2017-08-02

## 2017-07-31 RX ORDER — CLINDAMYCIN PHOSPHATE 150 MG/ML
INJECTION, SOLUTION INTRAVENOUS AS NEEDED
Status: DISCONTINUED | OUTPATIENT
Start: 2017-07-31 | End: 2017-07-31 | Stop reason: SURG

## 2017-07-31 RX ORDER — SENNOSIDES 8.6 MG
17.2 TABLET ORAL NIGHTLY
Status: DISCONTINUED | OUTPATIENT
Start: 2017-07-31 | End: 2017-08-02

## 2017-07-31 RX ORDER — NALOXONE HYDROCHLORIDE 0.4 MG/ML
80 INJECTION, SOLUTION INTRAMUSCULAR; INTRAVENOUS; SUBCUTANEOUS AS NEEDED
Status: DISCONTINUED | OUTPATIENT
Start: 2017-07-31 | End: 2017-07-31 | Stop reason: HOSPADM

## 2017-07-31 RX ORDER — MAGNESIUM HYDROXIDE 1200 MG/15ML
LIQUID ORAL CONTINUOUS PRN
Status: DISCONTINUED | OUTPATIENT
Start: 2017-07-31 | End: 2017-07-31

## 2017-07-31 RX ORDER — HYDROCODONE BITARTRATE AND ACETAMINOPHEN 5; 325 MG/1; MG/1
2 TABLET ORAL AS NEEDED
Status: DISCONTINUED | OUTPATIENT
Start: 2017-07-31 | End: 2017-07-31 | Stop reason: HOSPADM

## 2017-07-31 RX ORDER — MORPHINE SULFATE 4 MG/ML
4 INJECTION, SOLUTION INTRAMUSCULAR; INTRAVENOUS EVERY 10 MIN PRN
Status: DISCONTINUED | OUTPATIENT
Start: 2017-07-31 | End: 2017-07-31 | Stop reason: HOSPADM

## 2017-07-31 RX ORDER — SODIUM PHOSPHATE, DIBASIC AND SODIUM PHOSPHATE, MONOBASIC 7; 19 G/133ML; G/133ML
1 ENEMA RECTAL ONCE AS NEEDED
Status: DISCONTINUED | OUTPATIENT
Start: 2017-07-31 | End: 2017-08-02

## 2017-07-31 RX ORDER — DIPHENHYDRAMINE HYDROCHLORIDE 50 MG/ML
25 INJECTION INTRAMUSCULAR; INTRAVENOUS ONCE AS NEEDED
Status: ACTIVE | OUTPATIENT
Start: 2017-07-31 | End: 2017-07-31

## 2017-07-31 RX ORDER — QUETIAPINE 25 MG/1
25 TABLET, FILM COATED ORAL NIGHTLY
Status: DISCONTINUED | OUTPATIENT
Start: 2017-07-31 | End: 2017-08-02

## 2017-07-31 RX ORDER — MORPHINE SULFATE 2 MG/ML
2 INJECTION, SOLUTION INTRAMUSCULAR; INTRAVENOUS EVERY 10 MIN PRN
Status: DISCONTINUED | OUTPATIENT
Start: 2017-07-31 | End: 2017-07-31 | Stop reason: HOSPADM

## 2017-07-31 RX ORDER — MIDAZOLAM HYDROCHLORIDE 1 MG/ML
INJECTION INTRAMUSCULAR; INTRAVENOUS AS NEEDED
Status: DISCONTINUED | OUTPATIENT
Start: 2017-07-31 | End: 2017-07-31 | Stop reason: SURG

## 2017-07-31 RX ORDER — POLYETHYLENE GLYCOL 3350 17 G/17G
17 POWDER, FOR SOLUTION ORAL DAILY PRN
Status: DISCONTINUED | OUTPATIENT
Start: 2017-07-31 | End: 2017-08-02

## 2017-07-31 RX ORDER — CLINDAMYCIN PHOSPHATE 900 MG/50ML
900 INJECTION INTRAVENOUS EVERY 8 HOURS
Status: COMPLETED | OUTPATIENT
Start: 2017-07-31 | End: 2017-08-01

## 2017-07-31 RX ORDER — ATORVASTATIN CALCIUM 40 MG/1
40 TABLET, FILM COATED ORAL
Status: DISCONTINUED | OUTPATIENT
Start: 2017-08-01 | End: 2017-08-02

## 2017-07-31 RX ORDER — HYDROCODONE BITARTRATE AND ACETAMINOPHEN 10; 325 MG/1; MG/1
1 TABLET ORAL EVERY 6 HOURS PRN
Status: DISCONTINUED | OUTPATIENT
Start: 2017-07-31 | End: 2017-08-02

## 2017-07-31 RX ORDER — HYDROMORPHONE HYDROCHLORIDE 1 MG/ML
0.4 INJECTION, SOLUTION INTRAMUSCULAR; INTRAVENOUS; SUBCUTANEOUS EVERY 5 MIN PRN
Status: DISCONTINUED | OUTPATIENT
Start: 2017-07-31 | End: 2017-07-31 | Stop reason: HOSPADM

## 2017-07-31 RX ORDER — LIDOCAINE HYDROCHLORIDE 10 MG/ML
INJECTION, SOLUTION EPIDURAL; INFILTRATION; INTRACAUDAL; PERINEURAL AS NEEDED
Status: DISCONTINUED | OUTPATIENT
Start: 2017-07-31 | End: 2017-07-31 | Stop reason: SURG

## 2017-07-31 RX ORDER — EPHEDRINE SULFATE 50 MG/ML
INJECTION, SOLUTION INTRAVENOUS AS NEEDED
Status: DISCONTINUED | OUTPATIENT
Start: 2017-07-31 | End: 2017-07-31 | Stop reason: SURG

## 2017-07-31 RX ORDER — DIPHENHYDRAMINE HCL 25 MG
25 CAPSULE ORAL EVERY 4 HOURS PRN
Status: DISCONTINUED | OUTPATIENT
Start: 2017-07-31 | End: 2017-08-02

## 2017-07-31 RX ORDER — DOCUSATE SODIUM 100 MG/1
100 CAPSULE, LIQUID FILLED ORAL 2 TIMES DAILY
Status: DISCONTINUED | OUTPATIENT
Start: 2017-07-31 | End: 2017-08-02

## 2017-07-31 RX ORDER — METOCLOPRAMIDE HYDROCHLORIDE 5 MG/ML
10 INJECTION INTRAMUSCULAR; INTRAVENOUS EVERY 6 HOURS PRN
Status: DISCONTINUED | OUTPATIENT
Start: 2017-07-31 | End: 2017-08-02

## 2017-07-31 RX ORDER — DEXAMETHASONE SODIUM PHOSPHATE 10 MG/ML
INJECTION, SOLUTION INTRAMUSCULAR; INTRAVENOUS AS NEEDED
Status: DISCONTINUED | OUTPATIENT
Start: 2017-07-31 | End: 2017-07-31 | Stop reason: SURG

## 2017-07-31 RX ORDER — FLUTICASONE PROPIONATE 50 MCG
1 SPRAY, SUSPENSION (ML) NASAL DAILY
Status: DISCONTINUED | OUTPATIENT
Start: 2017-08-01 | End: 2017-08-02

## 2017-07-31 RX ORDER — ONDANSETRON 2 MG/ML
4 INJECTION INTRAMUSCULAR; INTRAVENOUS EVERY 4 HOURS PRN
Status: DISCONTINUED | OUTPATIENT
Start: 2017-07-31 | End: 2017-08-02

## 2017-07-31 RX ORDER — ESCITALOPRAM OXALATE 10 MG/1
40 TABLET ORAL DAILY
Status: DISCONTINUED | OUTPATIENT
Start: 2017-08-01 | End: 2017-08-02

## 2017-07-31 RX ORDER — ROPIVACAINE HYDROCHLORIDE 5 MG/ML
INJECTION, SOLUTION EPIDURAL; INFILTRATION; PERINEURAL AS NEEDED
Status: DISCONTINUED | OUTPATIENT
Start: 2017-07-31 | End: 2017-07-31 | Stop reason: SURG

## 2017-07-31 RX ORDER — DIPHENHYDRAMINE HYDROCHLORIDE 50 MG/ML
12.5 INJECTION INTRAMUSCULAR; INTRAVENOUS EVERY 4 HOURS PRN
Status: DISCONTINUED | OUTPATIENT
Start: 2017-07-31 | End: 2017-08-02

## 2017-07-31 RX ORDER — SODIUM CHLORIDE 9 MG/ML
INJECTION, SOLUTION INTRAVENOUS CONTINUOUS
Status: DISCONTINUED | OUTPATIENT
Start: 2017-07-31 | End: 2017-08-02

## 2017-07-31 RX ORDER — OXYCODONE HCL 10 MG/1
10 TABLET, FILM COATED, EXTENDED RELEASE ORAL EVERY 12 HOURS PRN
Status: DISCONTINUED | OUTPATIENT
Start: 2017-07-31 | End: 2017-08-02

## 2017-07-31 RX ORDER — MORPHINE SULFATE 4 MG/ML
6 INJECTION, SOLUTION INTRAMUSCULAR; INTRAVENOUS EVERY 10 MIN PRN
Status: DISCONTINUED | OUTPATIENT
Start: 2017-07-31 | End: 2017-07-31 | Stop reason: HOSPADM

## 2017-07-31 RX ORDER — HYDROCODONE BITARTRATE AND ACETAMINOPHEN 5; 325 MG/1; MG/1
1 TABLET ORAL AS NEEDED
Status: DISCONTINUED | OUTPATIENT
Start: 2017-07-31 | End: 2017-07-31 | Stop reason: HOSPADM

## 2017-07-31 RX ORDER — CLINDAMYCIN PHOSPHATE 900 MG/50ML
900 INJECTION INTRAVENOUS ONCE
Status: DISCONTINUED | OUTPATIENT
Start: 2017-07-31 | End: 2017-07-31 | Stop reason: HOSPADM

## 2017-07-31 RX ORDER — GLYCOPYRROLATE 0.2 MG/ML
INJECTION INTRAMUSCULAR; INTRAVENOUS AS NEEDED
Status: DISCONTINUED | OUTPATIENT
Start: 2017-07-31 | End: 2017-07-31 | Stop reason: SURG

## 2017-07-31 RX ORDER — HYDROMORPHONE HYDROCHLORIDE 1 MG/ML
0.2 INJECTION, SOLUTION INTRAMUSCULAR; INTRAVENOUS; SUBCUTANEOUS EVERY 5 MIN PRN
Status: DISCONTINUED | OUTPATIENT
Start: 2017-07-31 | End: 2017-07-31 | Stop reason: HOSPADM

## 2017-07-31 RX ADMIN — ROPIVACAINE HYDROCHLORIDE 30 ML: 5 INJECTION, SOLUTION EPIDURAL; INFILTRATION; PERINEURAL at 11:47:00

## 2017-07-31 RX ADMIN — SODIUM CHLORIDE, SODIUM LACTATE, POTASSIUM CHLORIDE, CALCIUM CHLORIDE: 600; 310; 30; 20 INJECTION, SOLUTION INTRAVENOUS at 14:50:00

## 2017-07-31 RX ADMIN — SODIUM CHLORIDE, SODIUM LACTATE, POTASSIUM CHLORIDE, CALCIUM CHLORIDE: 600; 310; 30; 20 INJECTION, SOLUTION INTRAVENOUS at 14:20:00

## 2017-07-31 RX ADMIN — TRANEXAMIC ACID 1000 MG: 100 INJECTION, SOLUTION INTRAVENOUS at 14:25:00

## 2017-07-31 RX ADMIN — SODIUM CHLORIDE, SODIUM LACTATE, POTASSIUM CHLORIDE, CALCIUM CHLORIDE: 600; 310; 30; 20 INJECTION, SOLUTION INTRAVENOUS at 12:24:00

## 2017-07-31 RX ADMIN — LIDOCAINE HYDROCHLORIDE 5 ML: 10 INJECTION, SOLUTION EPIDURAL; INFILTRATION; INTRACAUDAL; PERINEURAL at 12:25:00

## 2017-07-31 RX ADMIN — LIDOCAINE HYDROCHLORIDE 5 ML: 10 INJECTION, SOLUTION EPIDURAL; INFILTRATION; INTRACAUDAL; PERINEURAL at 11:47:00

## 2017-07-31 RX ADMIN — EPHEDRINE SULFATE 5 MG: 50 INJECTION, SOLUTION INTRAVENOUS at 12:45:00

## 2017-07-31 RX ADMIN — DEXAMETHASONE SODIUM PHOSPHATE 5 MG: 10 INJECTION, SOLUTION INTRAMUSCULAR; INTRAVENOUS at 11:47:00

## 2017-07-31 RX ADMIN — EPHEDRINE SULFATE 10 MG: 50 INJECTION, SOLUTION INTRAVENOUS at 14:25:00

## 2017-07-31 RX ADMIN — CLINDAMYCIN PHOSPHATE 900 MG: 150 INJECTION, SOLUTION INTRAVENOUS at 12:35:00

## 2017-07-31 RX ADMIN — DEXAMETHASONE SODIUM PHOSPHATE 4 MG: 4 MG/ML VIAL (ML) INJECTION at 12:25:00

## 2017-07-31 RX ADMIN — ONDANSETRON 4 MG: 2 INJECTION INTRAMUSCULAR; INTRAVENOUS at 12:25:00

## 2017-07-31 RX ADMIN — TRANEXAMIC ACID 1000 MG: 100 INJECTION, SOLUTION INTRAVENOUS at 12:26:00

## 2017-07-31 RX ADMIN — GLYCOPYRROLATE 0.2 MG: 0.2 INJECTION INTRAMUSCULAR; INTRAVENOUS at 12:25:00

## 2017-07-31 RX ADMIN — MIDAZOLAM HYDROCHLORIDE 2 MG: 1 INJECTION INTRAMUSCULAR; INTRAVENOUS at 11:43:00

## 2017-07-31 NOTE — OPERATIVE REPORT
[facility]    PATIENT'S NAME: Kasey Scot   ATTENDING PHYSICIAN: Shade Wilcox MD   OPERATING PHYSICIAN: Judy Blanco MD   PATIENT ACCOUNT#:   [id number]    LOCATION:  [facility] [address] PAC[address] [address]  MEDICAL RECORD #:   [id number]       DATE canal block by the anesthesia service. She was brought back to the operating room where she was intubated successfully. The patient's left lower extremity was prepped and draped in standard surgical fashion.   A well-padded tourniquet was placed on the pa and flexion and was stable to varus and valgus stress at 0 and 30 degrees. At this time, the femoral implant was taken out of the wound. The knee was hyperflexed. The proximal tibia was then prepared to receive the final implant.   The patella was everte Norco and OxyContin for pain control.       Dictated By Jordyn Pittman MD  d: 07/31/2017 14:55:11  t: 07/31/2017 15:25:22  Job [id number]  CJ/

## 2017-07-31 NOTE — PROGRESS NOTES
[facility]    Progress Note    Marlendarek Don Patient Status:  Surgery Admit    1955 MRN [id number]   Location [facility] UNIT Attending Key Land MD   Hosp Day # 0 PCP Charlie Humphrey MD HGB 14.0 07/21/2017   HCT 41.4 07/21/2017    07/21/2017   CREATSERUM 0.84 07/21/2017   BUN 18 07/21/2017    07/21/2017   K 4.3 07/21/2017    07/21/2017   CO2 28 07/21/2017   GLU 93 07/21/2017   CA 10.0 07/21/2017   ALB 4.3 07/21/2017

## 2017-07-31 NOTE — PHYSICAL THERAPY NOTE
PHYSICAL THERAPY KNEE EVALUATION - INPATIENT     Room Number: 424/424-A  Evaluation Date: 7/31/2017  Type of Evaluation: Initial  Physician Order: PT Eval and Treat    Presenting Problem: L TKA  Reason for Therapy: Mobility Dysfunction and Discharge Planni • Pathological dislocation of right upper arm    • Sinusitis        Past Surgical History  Past Surgical History:  No date: ARTHROSCOPY OF JOINT UNLISTED Left      Comment: knee  No date: ARTHROSCOPY OF JOINT UNLISTED Right      Comment: knee  No date: C from lying on back to sitting on the side of the bed?: A Little   How much help from another person does the patient currently need. ..   -   Moving to and from a bed to a chair (including a wheelchair)?: A Little   -   Need to walk in hospital room?: A Lit

## 2017-07-31 NOTE — ANESTHESIA PREPROCEDURE EVALUATION
Anesthesia PreOp Note    HPI:     Costa Manzo is a 64year old female who presents for preoperative consultation requested by: Darleen Sin MD    Date of Surgery: 7/31/2017    Procedure(s):  KNEE TOTAL REPLACEMENT  Indication: left knee [place]di and Tyler daily. Disp: 90 tablet Rfl: 3 7/30/2017 at 2100   QUEtiapine Fumarate (SEROQUEL) 25 MG Oral Tab Take 25 mg by mouth nightly. Disp:  Rfl: 0 7/30/2017 at 2100   Fluticasone Propionate (FLONASE) 50 MCG/ACT Nasal Suspension by Nasal route.  Disp:  Rfl:  7/30/ Other      Family H/O       Social History  Social History   Marital status: Life Partner  Spouse name: N/A    Years of education: N/A  Number of children: N/A     Occupational History  None on file     Social History Main Topics   Smoking status: Never Sm and normal exam    Neuro/Psych - negative ROS     GI/Hepatic/Renal - negative ROS     Endo/Other - negative ROS   Abdominal  - normal exam             Anesthesia Plan:   ASA:  2  Plan:   General and regional  Post-op Pain Management: Saphenous block  Infor

## 2017-07-31 NOTE — ANESTHESIA POSTPROCEDURE EVALUATION
Patient: Hannah Hardy    Procedure Summary     Date:  07/31/17 Room / Location:  [address] [place] [address] MAIN OR 08 / [address] [place] [address] OR    Anesthesia Start:  1263 Anesthesia Stop:      Procedure:  KNEE TOTAL REPLACEMENT (Left Knee) Diagnosis:  (left knee osteoarthritis)    Surg

## 2017-07-31 NOTE — BRIEF OP NOTE
Pre-Operative Diagnosis: left knee osteoarthritis     Post-Operative Diagnosis: left knee osteoarthritis     Procedure Performed:   Procedure(s):  left total knee arthroplasty    Surgeon(s) and Role:     Mando Ashley MD - Primary    Assistant(

## 2017-07-31 NOTE — ANESTHESIA PROCEDURE NOTES
Peripheral Block    Anesthesiologist:  Ayad Ann  Patient Location:  PACU  Start Time:  7/31/2017 11:32 AM  End Time:  7/31/2017 11:43 AM  Site Identification: ultrasound guided, real time ultrasound guided and surface landmarks    Reason for Block:

## 2017-07-31 NOTE — H&P
7/31/2017  Blayne Hernandes  65/1955  64year old   female  Alona Cockayne, MD     HPI:   Patient presents with:       This is a pleasant 60-year-old female that has left knee osteoarthritis and presents for definitive treatment of the left knee.    patient understands the risks and wishes to proceed with surgery.        All of their questions were answered and they are in agreement with the treatment plan.          ALLERGIES:    Penicillins             Hives     No current outpatient prescriptions on

## 2017-08-01 LAB
ERYTHROCYTE [DISTWIDTH] IN BLOOD BY AUTOMATED COUNT: 14.2 % (ref 11–15)
HCT VFR BLD AUTO: 31.7 % (ref 35–48)
HGB BLD-MCNC: 11 G/DL (ref 12–16)
MCH RBC QN AUTO: 31.9 PG (ref 27–32)
MCHC RBC AUTO-ENTMCNC: 34.8 G/DL (ref 32–37)
MCV RBC AUTO: 91.6 FL (ref 80–100)
PLATELET # BLD AUTO: 180 K/UL (ref 140–400)
PMV BLD AUTO: 7 FL (ref 7.4–10.3)
RBC # BLD AUTO: 3.45 M/UL (ref 3.7–5.4)
WBC # BLD AUTO: 10 K/UL (ref 4–11)

## 2017-08-01 RX ORDER — HYDROCODONE BITARTRATE AND ACETAMINOPHEN 10; 325 MG/1; MG/1
1-2 TABLET ORAL EVERY 6 HOURS PRN
Qty: 60 TABLET | Refills: 0 | Status: SHIPPED | OUTPATIENT
Start: 2017-08-01 | End: 2017-08-11

## 2017-08-01 RX ORDER — MORPHINE SULFATE 2 MG/ML
INJECTION, SOLUTION INTRAMUSCULAR; INTRAVENOUS
Status: DISPENSED
Start: 2017-08-01 | End: 2017-08-02

## 2017-08-01 RX ORDER — OXYCODONE HCL 10 MG/1
10 TABLET, FILM COATED, EXTENDED RELEASE ORAL EVERY 12 HOURS PRN
Qty: 20 TABLET | Refills: 0 | Status: SHIPPED | OUTPATIENT
Start: 2017-08-01 | End: 2017-08-15

## 2017-08-01 RX ORDER — MORPHINE SULFATE 2 MG/ML
2 INJECTION, SOLUTION INTRAMUSCULAR; INTRAVENOUS EVERY 4 HOURS PRN
Status: DISCONTINUED | OUTPATIENT
Start: 2017-08-01 | End: 2017-08-02

## 2017-08-01 NOTE — PROGRESS NOTES
[facility]  Progress Note    Andria Carter Patient Status:  Inpatient    1955 MRN [id number]   Location [facility] 4W/SW/SE Attending Dave Evangelsita MD   Hosp Day # 1 PCP Cornelia Pereyra MD     SUBJECTIVE:  INTERVAL HISTORY: POD management  7.  Follow up in office with Reggie Balbuena MD in 2 weeks    Yareli Chambers PA-C  8/1/2017  1:14 PM

## 2017-08-01 NOTE — PLAN OF CARE
DISCHARGE PLANNING    • Discharge to home or other facility with appropriate resources Progressing        HEMATOLOGIC - ADULT    • Maintains hematologic stability Progressing    • Free from bleeding injury Progressing        Impaired Activities of Daily Jayesh Call

## 2017-08-01 NOTE — PLAN OF CARE
DISCHARGE PLANNING    • Discharge to home or other facility with appropriate resources Progressing        HEMATOLOGIC - ADULT    • Maintains hematologic stability Progressing    • Free from bleeding injury Progressing        Impaired Activities of Daily Yuliana Willson

## 2017-08-01 NOTE — PHYSICAL THERAPY NOTE
PHYSICAL THERAPY KNEE TREATMENT NOTE - INPATIENT     Room Number: 424/424-A             Presenting Problem: L TKA    Problem List  Principal Problem:    Primary osteoarthritis of left knee  Active Problems:    Hyperlipidemia      ASSESSMENT   Pt in chair Score:  Raw Score: 18   PT Approx Degree of Impairment Score: 46.58%   Standardized Score (AM-PAC Scale): 43.63   CMS Modifier (G-Code): CK    FUNCTIONAL ABILITY STATUS  Gait Assessment   Gait Assistance: Supervision  Distance (ft): 150  Assistive Device: discharge.    Goal #6  Current Status IN PROGRESS

## 2017-08-01 NOTE — DISCHARGE PLANNING
VEDA met with the pt. At bedside. The pt. Lives with her wife in a bilevel home with 6 stairs between levels. The pt. Reports being independent prior to admission with adls, ambulation and driving. The pt.  Is planning to return home when medically stable

## 2017-08-01 NOTE — HOME CARE LIAISON
DIAGNOSES AND PERTINENT MEDICAL HISTORY: LEFT TKA    FACILITY NAME AND DC [facility] [place] [facility]    BEDSIDE VISIT WITH: PTNT    SERVICES ORDERED: PT RN    VERIFIED PATIENT ADDRESS, PHONE NUMBER AND CAREGIVER: YES    PCP IS DR. Joan Pineda

## 2017-08-01 NOTE — PLAN OF CARE
DISCHARGE PLANNING    • Discharge to home or other facility with appropriate resources Progressing        HEMATOLOGIC - ADULT    • Maintains hematologic stability Progressing    • Free from bleeding injury Progressing        Impaired Activities of Daily Garo Donis

## 2017-08-02 ENCOUNTER — TELEPHONE (OUTPATIENT)
Dept: ORTHOPEDICS CLINIC | Facility: CLINIC | Age: 62
End: 2017-08-02

## 2017-08-02 ENCOUNTER — APPOINTMENT (OUTPATIENT)
Dept: ULTRASOUND IMAGING | Facility: HOSPITAL | Age: 62
DRG: 470 | End: 2017-08-02
Attending: HOSPITALIST
Payer: COMMERCIAL

## 2017-08-02 VITALS
HEART RATE: 79 BPM | BODY MASS INDEX: 23.54 KG/M2 | WEIGHT: 150 LBS | RESPIRATION RATE: 18 BRPM | HEIGHT: 67 IN | OXYGEN SATURATION: 96 % | TEMPERATURE: 98 F | SYSTOLIC BLOOD PRESSURE: 98 MMHG | DIASTOLIC BLOOD PRESSURE: 62 MMHG

## 2017-08-02 LAB
ERYTHROCYTE [DISTWIDTH] IN BLOOD BY AUTOMATED COUNT: 13.9 % (ref 11–15)
HCT VFR BLD AUTO: 29.8 % (ref 35–48)
HGB BLD-MCNC: 10.3 G/DL (ref 12–16)
MCH RBC QN AUTO: 31.5 PG (ref 27–32)
MCHC RBC AUTO-ENTMCNC: 34.5 G/DL (ref 32–37)
MCV RBC AUTO: 91.3 FL (ref 80–100)
PLATELET # BLD AUTO: 146 K/UL (ref 140–400)
PMV BLD AUTO: 6.8 FL (ref 7.4–10.3)
RBC # BLD AUTO: 3.27 M/UL (ref 3.7–5.4)
WBC # BLD AUTO: 5 K/UL (ref 4–11)

## 2017-08-02 PROCEDURE — 93971 EXTREMITY STUDY: CPT | Performed by: HOSPITALIST

## 2017-08-02 PROCEDURE — 99239 HOSP IP/OBS DSCHRG MGMT >30: CPT | Performed by: HOSPITALIST

## 2017-08-02 RX ORDER — HYDROMORPHONE HYDROCHLORIDE 1 MG/ML
1 INJECTION, SOLUTION INTRAMUSCULAR; INTRAVENOUS; SUBCUTANEOUS EVERY 2 HOUR PRN
Status: DISCONTINUED | OUTPATIENT
Start: 2017-08-02 | End: 2017-08-02

## 2017-08-02 RX ORDER — PSEUDOEPHEDRINE HCL 30 MG
100 TABLET ORAL 2 TIMES DAILY
Refills: 0 | Status: SHIPPED | COMMUNITY
Start: 2017-08-02 | End: 2017-09-12 | Stop reason: ALTCHOICE

## 2017-08-02 RX ORDER — HYDROMORPHONE HYDROCHLORIDE 1 MG/ML
0.5 INJECTION, SOLUTION INTRAMUSCULAR; INTRAVENOUS; SUBCUTANEOUS EVERY 2 HOUR PRN
Status: DISCONTINUED | OUTPATIENT
Start: 2017-08-02 | End: 2017-08-02

## 2017-08-02 RX ORDER — HYDROMORPHONE HYDROCHLORIDE 1 MG/ML
INJECTION, SOLUTION INTRAMUSCULAR; INTRAVENOUS; SUBCUTANEOUS
Status: DISPENSED
Start: 2017-08-02 | End: 2017-08-02

## 2017-08-02 NOTE — PROGRESS NOTES
[facility] Patient Status:  Inpatient    1955 MRN [id number]   Location [facility] 4W/SW/SE Attending Virgie Goel MD   Hosp Day # 2 PCP Jennyfer España MD     Subjective:  Post-Operative Day: 2 Status Post left Total

## 2017-08-02 NOTE — PHYSICAL THERAPY NOTE
PHYSICAL THERAPY KNEE TREATMENT NOTE - INPATIENT     Room Number: 424/424-A             Presenting Problem: L TKA    Problem List  Principal Problem:    Primary osteoarthritis of left knee  Active Problems:    Hyperlipidemia      ASSESSMENT   Pt in chair chair (including a wheelchair)?: None   -   Need to walk in hospital room?: None   -   Climbing 3-5 steps with a railing?: A Little    AM-PAC Score:  Raw Score: 22   PT Approx Degree of Impairment Score: 20.91%   Standardized Score (AM-PAC Scale): 53.28 flexion     Goal #5   Current Status AAROM 90 deg flex   Goal #6 Patient independently performs home exercise program for ROM/strengthening per the instructions provided in preparation for discharge.    Goal #6  Current Status IN PROGRESS

## 2017-08-02 NOTE — OCCUPATIONAL THERAPY NOTE
OCCUPATIONAL THERAPY EVALUATION - INPATIENT      Room Number: 424/424-A  Evaluation Date: 8/2/2017  Type of Evaluation: Initial  Presenting Problem: L TKA    Physician Order: IP Consult to Occupational Therapy  Reason for Therapy: ADL/IADL Dysfunction and CORRECT COURTNEY NAVARRETE METHODS Right  1986: FOOT SURGERY Right      Comment: Bunionectomy Bone spur R Foot  2015: FRACTURE SURGERY Left      Comment: elbow radial head replacement  3/20/17: TOTAL KNEE REPLACEMENT Right      Comment: DR. PATRICK  07/31/2017:  Grover Becerra bedpan or urinal? : A Little  -   Putting on and taking off regular upper body clothing?: None  -   Taking care of personal grooming such as brushing teeth?: None  -   Eating meals?: None    AM-PAC Score:  Score: 23  Approx Degree of Impairment: 15.86%  St

## 2017-08-02 NOTE — TELEPHONE ENCOUNTER
S/w pt and she states her partner has looked around for the oxycodone and several [facility] don't have it, but one location has it brand name and wants to know if there would be any difference in the formula.  I advised her brand and generic is generally the

## 2017-08-02 NOTE — PLAN OF CARE
DISCHARGE PLANNING    • Discharge to home or other facility with appropriate resources Progressing        HEMATOLOGIC - ADULT    • Maintains hematologic stability Progressing    • Free from bleeding injury Progressing        Impaired Activities of Daily Lucrezia Sick monitor.

## 2017-08-02 NOTE — DISCHARGE PLANNING
Plan is for discharge home today 8/2 with [facility]. [facility] aware[facility]     [phone]

## 2017-08-02 NOTE — DISCHARGE SUMMARY
[facility]  Discharge Summary     Emily Leonard  : 1955    Status: Inpatient  Day #: 2    Attending: Radha Angel MD  PCP: Otilia Bonilla MD     Date of Admission: 2017  Date of Discharge: 2017     Hospital Discharge Diag daily. Refills:  0     HYDROcodone-acetaminophen  MG Tabs  Commonly known as:  NORCO      Take 1-2 tablets by mouth every 6 (six) hours as needed.    Stop taking on:  8/11/2017  Quantity:  60 tablet  Refills:  0     OxyCODONE HCl ER 10 MG T12a  Comm will be handled by a member of the Care Management Team.  For all other patients, please follow usual protocol for discharge care transition. Lace+ Score: 8  59-90 High Risk  29-58 Medium Risk  0-28   Low Risk.     Risk of readmission: Mariama alex

## 2017-08-02 NOTE — PLAN OF CARE
DISCHARGE PLANNING    • Discharge to home or other facility with appropriate resources Adequate for Discharge        HEMATOLOGIC - ADULT    • Maintains hematologic stability Adequate for Discharge    • Free from bleeding injury Adequate for Discharge place to left knee incision. No drainage noted.  Patient to be d/c 8/2 after clearance from [facility]

## 2017-08-02 NOTE — TELEPHONE ENCOUNTER
Patient states had surgery with [facility] and was prescribed Oxycodone for pain. Pharmacies around her area do not currently carry. Found a pharmacy who did not have generic brand but has the name brand of medication.  Wants to know formula on name brand is the s

## 2017-08-03 ENCOUNTER — TELEPHONE (OUTPATIENT)
Dept: INTERNAL MEDICINE UNIT | Facility: HOSPITAL | Age: 62
End: 2017-08-03

## 2017-08-03 NOTE — TELEPHONE ENCOUNTER
Pt discharged from [facility] on 8/2/17 . Please call to schedule follow up with Primary Care Physician.    Thanks

## 2017-08-04 ENCOUNTER — TELEPHONE (OUTPATIENT)
Dept: INTERNAL MEDICINE CLINIC | Facility: CLINIC | Age: 62
End: 2017-08-04

## 2017-08-04 NOTE — TELEPHONE ENCOUNTER
Chart reviewed; see 7/24/17 encounter. UA results were given to patient 8/3/17. Patient was cleared for sx.

## 2017-08-07 ENCOUNTER — TELEPHONE (OUTPATIENT)
Dept: ORTHOPEDICS CLINIC | Facility: CLINIC | Age: 62
End: 2017-08-07

## 2017-08-07 DIAGNOSIS — M17.12 PRIMARY OSTEOARTHRITIS OF LEFT KNEE: Primary | ICD-10-CM

## 2017-08-07 NOTE — TELEPHONE ENCOUNTER
In  Home physical therapy now  Will be ready to go to out patient. Can she get and early order before next visit so she will not lose any time with waiting for referrals.    Please advise

## 2017-08-11 ENCOUNTER — TELEPHONE (OUTPATIENT)
Dept: ORTHOPEDICS CLINIC | Facility: CLINIC | Age: 62
End: 2017-08-11

## 2017-08-11 NOTE — TELEPHONE ENCOUNTER
Danielle Million from [address] that pt is being discharged from home care today and will start outpatient next week - thank you.

## 2017-08-11 NOTE — TELEPHONE ENCOUNTER
Current Outpatient Prescriptions:     •  HYDROcodone-acetaminophen  MG Oral Tab, Take 1-2 tablets by mouth every 6 (six) hours as needed. , Disp: 60 tablet, Rfl: 0

## 2017-08-14 RX ORDER — HYDROCODONE BITARTRATE AND ACETAMINOPHEN 10; 325 MG/1; MG/1
TABLET ORAL
Qty: 60 TABLET | Refills: 0 | Status: SHIPPED | OUTPATIENT
Start: 2017-08-14 | End: 2017-09-12 | Stop reason: ALTCHOICE

## 2017-08-14 NOTE — TELEPHONE ENCOUNTER
Olga Faustin called and notified of prescription signed by Dr. Samantha Styles.  Will  this afternoon

## 2017-08-15 ENCOUNTER — OFFICE VISIT (OUTPATIENT)
Dept: INTERNAL MEDICINE CLINIC | Facility: CLINIC | Age: 62
End: 2017-08-15

## 2017-08-15 ENCOUNTER — OFFICE VISIT (OUTPATIENT)
Dept: ORTHOPEDICS CLINIC | Facility: CLINIC | Age: 62
End: 2017-08-15

## 2017-08-15 ENCOUNTER — HOSPITAL ENCOUNTER (OUTPATIENT)
Dept: GENERAL RADIOLOGY | Facility: HOSPITAL | Age: 62
Discharge: HOME | End: 2017-08-15
Attending: ORTHOPAEDIC SURGERY
Payer: COMMERCIAL

## 2017-08-15 VITALS
TEMPERATURE: 98 F | DIASTOLIC BLOOD PRESSURE: 84 MMHG | HEART RATE: 74 BPM | HEIGHT: 67.5 IN | SYSTOLIC BLOOD PRESSURE: 124 MMHG | WEIGHT: 151 LBS | BODY MASS INDEX: 23.42 KG/M2

## 2017-08-15 DIAGNOSIS — D50.0 ANEMIA, BLOOD LOSS: ICD-10-CM

## 2017-08-15 DIAGNOSIS — M17.12 PRIMARY OSTEOARTHRITIS OF LEFT KNEE: Primary | ICD-10-CM

## 2017-08-15 DIAGNOSIS — Z96.652 AFTERCARE FOLLOWING LEFT KNEE JOINT REPLACEMENT SURGERY: ICD-10-CM

## 2017-08-15 DIAGNOSIS — Z47.1 AFTERCARE FOLLOWING LEFT KNEE JOINT REPLACEMENT SURGERY: ICD-10-CM

## 2017-08-15 DIAGNOSIS — Z47.89 ORTHOPEDIC AFTERCARE: ICD-10-CM

## 2017-08-15 DIAGNOSIS — Z12.11 SCREENING FOR COLON CANCER: ICD-10-CM

## 2017-08-15 PROCEDURE — 99212 OFFICE O/P EST SF 10 MIN: CPT | Performed by: ORTHOPAEDIC SURGERY

## 2017-08-15 PROCEDURE — 99495 TRANSJ CARE MGMT MOD F2F 14D: CPT | Performed by: INTERNAL MEDICINE

## 2017-08-15 PROCEDURE — 73562 X-RAY EXAM OF KNEE 3: CPT | Performed by: ORTHOPAEDIC SURGERY

## 2017-08-15 PROCEDURE — 99024 POSTOP FOLLOW-UP VISIT: CPT | Performed by: ORTHOPAEDIC SURGERY

## 2017-08-15 NOTE — PROGRESS NOTES
This is a pleasant 70-year-old female that is 2 weeks status post a left total knee arthroplasty. The patient said no fevers, chills, or markers of infection.   The patient has been taking her Xarelto as well as her Norco.  The patient comes in today for r

## 2017-08-16 ENCOUNTER — TELEPHONE (OUTPATIENT)
Dept: ORTHOPEDICS CLINIC | Facility: CLINIC | Age: 62
End: 2017-08-16

## 2017-08-17 ENCOUNTER — OFFICE VISIT (OUTPATIENT)
Dept: PHYSICAL THERAPY | Facility: HOSPITAL | Age: 62
End: 2017-08-17
Attending: ORTHOPAEDIC SURGERY
Payer: COMMERCIAL

## 2017-08-17 DIAGNOSIS — M17.12 PRIMARY OSTEOARTHRITIS OF LEFT KNEE: ICD-10-CM

## 2017-08-17 DIAGNOSIS — M17.11 PRIMARY OSTEOARTHRITIS OF RIGHT KNEE: ICD-10-CM

## 2017-08-17 DIAGNOSIS — Z47.89 ORTHOPEDIC AFTERCARE: ICD-10-CM

## 2017-08-17 PROCEDURE — 97161 PT EVAL LOW COMPLEX 20 MIN: CPT | Performed by: PHYSICAL THERAPIST

## 2017-08-17 PROCEDURE — 97110 THERAPEUTIC EXERCISES: CPT | Performed by: PHYSICAL THERAPIST

## 2017-08-17 NOTE — PROGRESS NOTES
KNEE EVALUATION:   Referring Physician: Dr. Corrie Pham  Diagnosis: Primary osteoarthritis of left knee (M17.12)      Onset Date 7/31/17 Evaluation Date: 8/17/2017  Visit # 1  Scheduled Visits 8  Insurance Authorized visits 13 O     PATIENT SUMMARY   Francella Peabody Assessment: Incision healing well with no signs of infection  Edema: Suprapatella R 36cm L39.5cm Infrapatella R 33cm L 35cm      Today’s Treatment and Response: Patient education provided on continuatin of HEP  Patient was instructed in and issued a HEP fo therapist: Maciej Harrington PT, DPT, cert MDT       [de-identified] certification required: Yes  I certify the need for these services furnished under this plan of treatment and while under my care.     X___________________________________________________ Da

## 2017-08-21 ENCOUNTER — OFFICE VISIT (OUTPATIENT)
Dept: PHYSICAL THERAPY | Facility: HOSPITAL | Age: 62
End: 2017-08-21
Attending: ORTHOPAEDIC SURGERY
Payer: COMMERCIAL

## 2017-08-21 DIAGNOSIS — Z47.89 ORTHOPEDIC AFTERCARE: ICD-10-CM

## 2017-08-21 DIAGNOSIS — M17.11 PRIMARY OSTEOARTHRITIS OF RIGHT KNEE: ICD-10-CM

## 2017-08-21 PROCEDURE — 97110 THERAPEUTIC EXERCISES: CPT

## 2017-08-21 NOTE — PROGRESS NOTES
Diagnosis:Diagnosis: Primary osteoarthritis of left knee (M17.12)      Authorized # of Visits:  2/13         Next MD visit: none scheduled  Fall Risk: standard         Precautions: n/a           Medication Changes since last visit?: No  Subjective: Pt stat

## 2017-08-22 NOTE — PROGRESS NOTES
HPI:    Fiona Nguyễn is a 58year old female here today for hospital follow up.    She was discharged from Inpatient hospital, [facility]  to Home   Admission Date: 7/31/17   Discharge Date: 8/2/17  Hospital Discharge Diagnosis: osteoarthritis le Reconciliation:  I am aware of an inpatient discharge within the last 30 days. The discharge medication list has been reconciled with the patient's current medication list and reviewed by me.   See medication list for additions of new medication, and akers includes Alzheimer's Disease in an other family member; Cancer in her mother; Heart Disease in an other family member; Heart Disorder in her mother; Myocardial Infarction in her brother; Psychiatric in her father. She  reports that she has never smoked. lesions  HEENT: atraumatic, normocephalic, ears and throat are clear  EYES: PERRLA, EOMI, conjunctiva are clear  NECK: supple, no adenopathy, no bruits  CHEST: no chest tenderness  LUNGS: clear to auscultation  CARDIO: RRR without murmur  GI: good BS's, no Significant Complications, Morbidity, and/or Mortality: moderate    Overall Risk:   moderate    Patient seen within 14 days from date of discharge.      Whitley Leiva MD, 8/22/2017

## 2017-08-23 ENCOUNTER — OFFICE VISIT (OUTPATIENT)
Dept: PHYSICAL THERAPY | Facility: HOSPITAL | Age: 62
End: 2017-08-23
Attending: ORTHOPAEDIC SURGERY
Payer: COMMERCIAL

## 2017-08-23 DIAGNOSIS — Z47.89 ORTHOPEDIC AFTERCARE: ICD-10-CM

## 2017-08-23 DIAGNOSIS — M17.11 PRIMARY OSTEOARTHRITIS OF RIGHT KNEE: ICD-10-CM

## 2017-08-23 PROCEDURE — 97110 THERAPEUTIC EXERCISES: CPT

## 2017-08-23 PROCEDURE — 97140 MANUAL THERAPY 1/> REGIONS: CPT

## 2017-08-23 NOTE — PROGRESS NOTES
Diagnosis:Diagnosis: Primary osteoarthritis of left knee (M17.12)      Authorized # of Visits:  3/13         Next MD visit: none scheduled  Fall Risk: standard         Precautions: n/a           Medication Changes since last visit?: No  Subjective: \"I fee

## 2017-08-28 ENCOUNTER — OFFICE VISIT (OUTPATIENT)
Dept: PHYSICAL THERAPY | Facility: HOSPITAL | Age: 62
End: 2017-08-28
Attending: ORTHOPAEDIC SURGERY
Payer: COMMERCIAL

## 2017-08-28 DIAGNOSIS — M17.11 PRIMARY OSTEOARTHRITIS OF RIGHT KNEE: ICD-10-CM

## 2017-08-28 DIAGNOSIS — Z47.89 ORTHOPEDIC AFTERCARE: ICD-10-CM

## 2017-08-28 PROCEDURE — 97110 THERAPEUTIC EXERCISES: CPT | Performed by: PHYSICAL THERAPIST

## 2017-08-28 NOTE — PROGRESS NOTES
Diagnosis:Diagnosis: Primary osteoarthritis of left knee (M17.12)      Authorized # of Visits:  4/13         Next MD visit: none scheduled  Fall Risk: standard         Precautions: n/a           Medication Changes since last visit?: No  Subjective: Pt repo

## 2017-08-29 ENCOUNTER — TELEPHONE (OUTPATIENT)
Dept: ORTHOPEDICS CLINIC | Facility: CLINIC | Age: 62
End: 2017-08-29

## 2017-08-29 RX ORDER — HYDROCODONE BITARTRATE AND ACETAMINOPHEN 5; 325 MG/1; MG/1
1 TABLET ORAL EVERY 6 HOURS PRN
Qty: 40 TABLET | Refills: 0 | Status: SHIPPED | OUTPATIENT
Start: 2017-08-29 | End: 2017-10-02

## 2017-08-29 NOTE — TELEPHONE ENCOUNTER
LM on pt's preferred # informing that Rx was approve by [facility] and is ready for p/u at ortho . Informed pt that office is closed right now but will open tomorrow at [address] [address] [address] and close tomorrow at [address] pt to bring ID with her.  Advised pt to apolinar

## 2017-08-29 NOTE — TELEPHONE ENCOUNTER
Spoke to pt. Pt is requesting refill of Norco 5/325 mg tablets. States she has 5 tablets left and 1/2 tablet left of Norco 10/325. Taking every 4-8 hours depending on her pain. Rates pain 3/10 right now. Pain increases to 5-6/10.  States pain is getting bet

## 2017-08-29 NOTE — TELEPHONE ENCOUNTER
Pt requesting a rx. norco 5-325. Pt would like to get the leaser dosage. Call when ready for . Zack Yung

## 2017-08-29 NOTE — TELEPHONE ENCOUNTER
Spoke to pt and she states she is in [employer] right now and will call back to discuss her pain and medication request.     -- Please send call to me when pt call back.

## 2017-08-30 ENCOUNTER — OFFICE VISIT (OUTPATIENT)
Dept: PHYSICAL THERAPY | Facility: HOSPITAL | Age: 62
End: 2017-08-30
Attending: ORTHOPAEDIC SURGERY
Payer: COMMERCIAL

## 2017-08-30 DIAGNOSIS — M17.11 PRIMARY OSTEOARTHRITIS OF RIGHT KNEE: ICD-10-CM

## 2017-08-30 DIAGNOSIS — Z47.89 ORTHOPEDIC AFTERCARE: ICD-10-CM

## 2017-08-30 PROCEDURE — 97110 THERAPEUTIC EXERCISES: CPT

## 2017-08-30 NOTE — PROGRESS NOTES
Diagnosis:Diagnosis: Primary osteoarthritis of left knee (M17.12)      Authorized # of Visits:  5/13         Next MD visit: none scheduled  Fall Risk: standard         Precautions: n/a           Medication Changes since last visit?: No  Subjective: Pt repo to at least 1 cm to promote quads facilitation in order to ambulate with TKE with heel strike in parking lot.       Plan: Continue for stretching, strengthening, ROM, postural training, HEP training, balance and proprioception training. Charges:  Feliciano Conway

## 2017-08-31 RX ORDER — ATORVASTATIN CALCIUM 40 MG/1
TABLET, FILM COATED ORAL
Qty: 90 TABLET | Refills: 0 | Status: SHIPPED | OUTPATIENT
Start: 2017-08-31 | End: 2017-12-03

## 2017-09-01 ENCOUNTER — OFFICE VISIT (OUTPATIENT)
Dept: PHYSICAL THERAPY | Facility: HOSPITAL | Age: 62
End: 2017-09-01
Attending: ORTHOPAEDIC SURGERY
Payer: COMMERCIAL

## 2017-09-01 DIAGNOSIS — M17.11 PRIMARY OSTEOARTHRITIS OF RIGHT KNEE: ICD-10-CM

## 2017-09-01 DIAGNOSIS — Z47.89 ORTHOPEDIC AFTERCARE: ICD-10-CM

## 2017-09-01 PROCEDURE — 97112 NEUROMUSCULAR REEDUCATION: CPT | Performed by: PHYSICAL THERAPIST

## 2017-09-01 PROCEDURE — 97110 THERAPEUTIC EXERCISES: CPT | Performed by: PHYSICAL THERAPIST

## 2017-09-01 NOTE — PROGRESS NOTES
Diagnosis:Diagnosis: Primary osteoarthritis of left knee (M17.12)      Authorized # of Visits:  6/13         Next MD visit: none scheduled  Fall Risk: standard         Precautions: n/a           Medication Changes since last visit?: No  Subjective: Pt repo min  Total Treatment Time: 38 min

## 2017-09-01 NOTE — TELEPHONE ENCOUNTER
Refilled per protocol. LOV 8-15-17 med rec done no change in meds.    Cholesterol Medications  Protocol Criteria:  · Appointment scheduled in the past 12 months or in the next 3 months  · ALT & LDL on file in the past 12 months  · ALT result < 80  · LDL res

## 2017-09-05 ENCOUNTER — OFFICE VISIT (OUTPATIENT)
Dept: PHYSICAL THERAPY | Facility: HOSPITAL | Age: 62
End: 2017-09-05
Attending: ORTHOPAEDIC SURGERY
Payer: COMMERCIAL

## 2017-09-05 DIAGNOSIS — Z47.89 ORTHOPEDIC AFTERCARE: ICD-10-CM

## 2017-09-05 DIAGNOSIS — M17.11 PRIMARY OSTEOARTHRITIS OF RIGHT KNEE: ICD-10-CM

## 2017-09-05 PROCEDURE — 97110 THERAPEUTIC EXERCISES: CPT | Performed by: PHYSICAL THERAPIST

## 2017-09-05 PROCEDURE — 97112 NEUROMUSCULAR REEDUCATION: CPT | Performed by: PHYSICAL THERAPIST

## 2017-09-05 NOTE — PROGRESS NOTES
Patient Name: Devi Pulido, : 1955, MRN: [id number]   Date:  2017  Referring Physician:  Froylan Briscoe    Diagnosis: Primary osteoarthritis of left knee (G75.81)        Progress note    Pt has attended 7 visits in Physical Therapy. agreed to actively participate in planning and for this course of care. Thank you for your referral. Please co-sign or sign and return this letter via fax as soon as possible to [phone].  If you have any questions, please contact me at Dept: [phone] distance with pain reported no higher than 2/10. 3. Pt will exhibit L knee AROM 0-120 in order to go up and down stairs with 1 UE support with reciprocal gait. 4. Pt to increase L LE strength to 4+/5 or better in order to perform car transfers  5.  Pt to

## 2017-09-07 ENCOUNTER — OFFICE VISIT (OUTPATIENT)
Dept: PHYSICAL THERAPY | Facility: HOSPITAL | Age: 62
End: 2017-09-07
Attending: ORTHOPAEDIC SURGERY
Payer: COMMERCIAL

## 2017-09-07 DIAGNOSIS — M17.11 PRIMARY OSTEOARTHRITIS OF RIGHT KNEE: ICD-10-CM

## 2017-09-07 DIAGNOSIS — Z47.89 ORTHOPEDIC AFTERCARE: ICD-10-CM

## 2017-09-07 PROCEDURE — 97140 MANUAL THERAPY 1/> REGIONS: CPT

## 2017-09-07 PROCEDURE — 97110 THERAPEUTIC EXERCISES: CPT

## 2017-09-07 NOTE — PROGRESS NOTES
Diagnosis:Diagnosis: Primary osteoarthritis of left knee (M17.12)      Authorized # of Visits:  8/13         Next MD visit: none scheduled  Fall Risk: standard         Precautions: n/a           Medication Changes since last visit?: No  Subjective: Pt repo ambulate without an assistive device community distance with pain reported no higher than 2/10. 3. Pt will exhibit L knee AROM 0-120 in order to go up and down stairs with 1 UE support with reciprocal gait.   4. Pt to increase L LE strength to 4+/5 or bet

## 2017-09-11 ENCOUNTER — APPOINTMENT (OUTPATIENT)
Dept: PHYSICAL THERAPY | Facility: HOSPITAL | Age: 62
End: 2017-09-11
Attending: ORTHOPAEDIC SURGERY
Payer: COMMERCIAL

## 2017-09-12 ENCOUNTER — OFFICE VISIT (OUTPATIENT)
Dept: ORTHOPEDICS CLINIC | Facility: CLINIC | Age: 62
End: 2017-09-12

## 2017-09-12 ENCOUNTER — HOSPITAL ENCOUNTER (OUTPATIENT)
Dept: GENERAL RADIOLOGY | Facility: HOSPITAL | Age: 62
Discharge: HOME | End: 2017-09-12
Attending: ORTHOPAEDIC SURGERY
Payer: COMMERCIAL

## 2017-09-12 DIAGNOSIS — M17.12 PRIMARY OSTEOARTHRITIS OF LEFT KNEE: Primary | ICD-10-CM

## 2017-09-12 DIAGNOSIS — Z47.89 ORTHOPEDIC AFTERCARE: ICD-10-CM

## 2017-09-12 PROCEDURE — 99212 OFFICE O/P EST SF 10 MIN: CPT | Performed by: ORTHOPAEDIC SURGERY

## 2017-09-12 PROCEDURE — 99024 POSTOP FOLLOW-UP VISIT: CPT | Performed by: ORTHOPAEDIC SURGERY

## 2017-09-12 PROCEDURE — 73560 X-RAY EXAM OF KNEE 1 OR 2: CPT | Performed by: ORTHOPAEDIC SURGERY

## 2017-09-12 RX ORDER — TRAMADOL HYDROCHLORIDE 50 MG/1
50 TABLET ORAL EVERY 6 HOURS PRN
Qty: 40 TABLET | Refills: 0 | Status: SHIPPED | OUTPATIENT
Start: 2017-09-12 | End: 2017-10-02

## 2017-09-12 NOTE — PROGRESS NOTES
This is a pleasant 20-year-old female that is 6 weeks status post left total knee arthroplasty. Patient has had no fevers, chills, or markers of infection. The patient reports she is doing a home exercise program daily.   The patient also reports she is d

## 2017-09-13 ENCOUNTER — OFFICE VISIT (OUTPATIENT)
Dept: PHYSICAL THERAPY | Facility: HOSPITAL | Age: 62
End: 2017-09-13
Attending: ORTHOPAEDIC SURGERY
Payer: COMMERCIAL

## 2017-09-13 DIAGNOSIS — Z47.89 ORTHOPEDIC AFTERCARE: ICD-10-CM

## 2017-09-13 DIAGNOSIS — M17.11 PRIMARY OSTEOARTHRITIS OF RIGHT KNEE: ICD-10-CM

## 2017-09-13 PROCEDURE — 97110 THERAPEUTIC EXERCISES: CPT

## 2017-09-13 NOTE — PROGRESS NOTES
Diagnosis:Diagnosis: Primary osteoarthritis of left knee (M17.12)      Authorized # of Visits:  9/13         Next MD visit: 10/10/17  Fall Risk: standard         Precautions: n/a           Medication Changes since last visit?: No  Subjective: Pt reports th with pain reported no higher than 2/10. 3. Pt will exhibit L knee AROM 0-120 in order to go up and down stairs with 1 UE support with reciprocal gait. 4. Pt to increase L LE strength to 4+/5 or better in order to perform car transfers  5.  Pt to decrease

## 2017-09-15 ENCOUNTER — OFFICE VISIT (OUTPATIENT)
Dept: PHYSICAL THERAPY | Facility: HOSPITAL | Age: 62
End: 2017-09-15
Attending: ORTHOPAEDIC SURGERY
Payer: COMMERCIAL

## 2017-09-15 DIAGNOSIS — M17.11 PRIMARY OSTEOARTHRITIS OF RIGHT KNEE: ICD-10-CM

## 2017-09-15 DIAGNOSIS — Z47.89 ORTHOPEDIC AFTERCARE: ICD-10-CM

## 2017-09-15 PROCEDURE — 97110 THERAPEUTIC EXERCISES: CPT | Performed by: PHYSICAL THERAPIST

## 2017-09-15 NOTE — PROGRESS NOTES
Diagnosis:Diagnosis: Primary osteoarthritis of left knee (M17.12)      Authorized # of Visits:  10/13         Next MD visit: 10/10/17  Fall Risk: standard         Precautions: n/a           Medication Changes since last visit?: No  Subjective: Pt reports t management of their symptoms. 2. Pt to ambulate without an assistive device community distance with pain reported no higher than 2/10. 3. Pt will exhibit L knee AROM 0-120 in order to go up and down stairs with 1 UE support with reciprocal gait.   4. Pt

## 2017-09-18 ENCOUNTER — OFFICE VISIT (OUTPATIENT)
Dept: PHYSICAL THERAPY | Facility: HOSPITAL | Age: 62
End: 2017-09-18
Attending: ORTHOPAEDIC SURGERY
Payer: COMMERCIAL

## 2017-09-18 DIAGNOSIS — M17.11 PRIMARY OSTEOARTHRITIS OF RIGHT KNEE: ICD-10-CM

## 2017-09-18 DIAGNOSIS — Z47.89 ORTHOPEDIC AFTERCARE: ICD-10-CM

## 2017-09-18 PROCEDURE — 97140 MANUAL THERAPY 1/> REGIONS: CPT | Performed by: PHYSICAL THERAPIST

## 2017-09-18 PROCEDURE — 97110 THERAPEUTIC EXERCISES: CPT | Performed by: PHYSICAL THERAPIST

## 2017-09-18 NOTE — PROGRESS NOTES
Patient has HMO please approve 10 more visits     Patient Name: Tylor Garcia, : 1955, MRN: [id number]   Date:  2017  Referring Physician:  Sydnee Yee    Diagnosis: Primary osteoarthritis of left knee (M17.12)     Progress note    Pt Therapy; Therapeutic Exercises; Neuromuscular Re-education;  Therapeutic Activity; Gait Training; Patient education; Home Exercise Program; modalities prn            Patient/Family/Caregiver was advised of these findings, precautions, and treatment options 1#  Prone TKE's 5\" hold x 15  Fwd step downs 2\" 2 x 10  Asc/desc 1 flight of 8 stairs x 2--decreased pain.   Level 3 incline board gastroc stretch 30\" x 3  Standing HS stretch 3 x 30 sec hold  Stair knee flexion stretch 10 x 10 sec hold  Step up 6\" 2 x

## 2017-09-20 ENCOUNTER — OFFICE VISIT (OUTPATIENT)
Dept: PHYSICAL THERAPY | Facility: HOSPITAL | Age: 62
End: 2017-09-20
Attending: ORTHOPAEDIC SURGERY
Payer: COMMERCIAL

## 2017-09-20 PROCEDURE — 97110 THERAPEUTIC EXERCISES: CPT

## 2017-09-20 PROCEDURE — 97140 MANUAL THERAPY 1/> REGIONS: CPT

## 2017-09-20 NOTE — PROGRESS NOTES
Diagnosis:Diagnosis: Primary osteoarthritis of left knee (M17.12)      Authorized # of Visits:  12/13         Next MD visit: 10/10/17  Fall Risk: standard         Precautions: n/a           Medication Changes since last visit?: No  Subjective: Pt reports t heat on the hamstring to decrease hamstring tightness and use ice on knee/patellar tendon to dec inflamation    Goals: To be met in 8-10 weeks  1. Pt to be independent in his HEP, its’ progression, and management of their symptoms.   2. Pt to ambulate with

## 2017-09-25 ENCOUNTER — OFFICE VISIT (OUTPATIENT)
Dept: PHYSICAL THERAPY | Facility: HOSPITAL | Age: 62
End: 2017-09-25
Attending: ORTHOPAEDIC SURGERY
Payer: COMMERCIAL

## 2017-09-25 PROCEDURE — 97110 THERAPEUTIC EXERCISES: CPT

## 2017-09-25 PROCEDURE — 97140 MANUAL THERAPY 1/> REGIONS: CPT

## 2017-09-25 NOTE — PROGRESS NOTES
Diagnosis:Diagnosis: Primary osteoarthritis of left knee (M17.12)      Authorized # of Visits:  13/13         Next MD visit: 10/10/17  Fall Risk: standard         Precautions: n/a           Medication Changes since last visit?: No  Subjective: Pt reports t decrease hamstring tightness and use ice on knee/patellar tendon to dec inflamation    Goals: To be met in 8-10 weeks  1. Pt to be independent in his HEP, its’ progression, and management of their symptoms.   2. Pt to ambulate without an assistive device c

## 2017-09-26 ENCOUNTER — TELEPHONE (OUTPATIENT)
Dept: ORTHOPEDICS CLINIC | Facility: CLINIC | Age: 62
End: 2017-09-26

## 2017-09-26 DIAGNOSIS — M17.12 PRIMARY OSTEOARTHRITIS OF LEFT KNEE: Primary | ICD-10-CM

## 2017-09-26 NOTE — TELEPHONE ENCOUNTER
Caller requesting a new referral for 10 visits of physical therapy. appt scheduled 9-27-17.    Please send

## 2017-09-27 ENCOUNTER — OFFICE VISIT (OUTPATIENT)
Dept: PHYSICAL THERAPY | Facility: HOSPITAL | Age: 62
End: 2017-09-27
Attending: ORTHOPAEDIC SURGERY
Payer: COMMERCIAL

## 2017-09-27 DIAGNOSIS — Z47.89 ORTHOPEDIC AFTERCARE: ICD-10-CM

## 2017-09-27 DIAGNOSIS — M17.11 PRIMARY OSTEOARTHRITIS OF RIGHT KNEE: ICD-10-CM

## 2017-09-27 PROCEDURE — 97110 THERAPEUTIC EXERCISES: CPT

## 2017-09-27 NOTE — PROGRESS NOTES
Diagnosis:Diagnosis: Primary osteoarthritis of left knee (M17.12)      Authorized # of Visits:  14/25         Next MD visit: 10/10/17  Fall Risk: standard         Precautions: n/a           Medication Changes since last visit?: No  Subjective: Pt reports t pain limits progress this session. Pt is able to tolerate fwd step down without pain. Goals: To be met in 8-10 weeks  1. Pt to be independent in his HEP, its’ progression, and management of their symptoms.   2. Pt to ambulate without an assistive devic

## 2017-10-02 ENCOUNTER — OFFICE VISIT (OUTPATIENT)
Dept: INTERNAL MEDICINE CLINIC | Facility: CLINIC | Age: 62
End: 2017-10-02

## 2017-10-02 ENCOUNTER — OFFICE VISIT (OUTPATIENT)
Dept: PHYSICAL THERAPY | Facility: HOSPITAL | Age: 62
End: 2017-10-02
Attending: ORTHOPAEDIC SURGERY
Payer: COMMERCIAL

## 2017-10-02 VITALS
SYSTOLIC BLOOD PRESSURE: 136 MMHG | WEIGHT: 152.63 LBS | HEART RATE: 62 BPM | DIASTOLIC BLOOD PRESSURE: 86 MMHG | HEIGHT: 67.5 IN | BODY MASS INDEX: 23.68 KG/M2

## 2017-10-02 DIAGNOSIS — Z47.89 ORTHOPEDIC AFTERCARE: ICD-10-CM

## 2017-10-02 DIAGNOSIS — M17.11 PRIMARY OSTEOARTHRITIS OF RIGHT KNEE: ICD-10-CM

## 2017-10-02 DIAGNOSIS — M54.32 SCIATICA OF LEFT SIDE: Primary | ICD-10-CM

## 2017-10-02 DIAGNOSIS — M25.562 LEFT KNEE PAIN, UNSPECIFIED CHRONICITY: ICD-10-CM

## 2017-10-02 PROCEDURE — 90471 IMMUNIZATION ADMIN: CPT | Performed by: INTERNAL MEDICINE

## 2017-10-02 PROCEDURE — 97110 THERAPEUTIC EXERCISES: CPT

## 2017-10-02 PROCEDURE — 90686 IIV4 VACC NO PRSV 0.5 ML IM: CPT | Performed by: INTERNAL MEDICINE

## 2017-10-02 PROCEDURE — 99214 OFFICE O/P EST MOD 30 MIN: CPT | Performed by: INTERNAL MEDICINE

## 2017-10-02 RX ORDER — CYCLOBENZAPRINE HCL 10 MG
10 TABLET ORAL 3 TIMES DAILY PRN
Qty: 30 TABLET | Refills: 0 | Status: SHIPPED | OUTPATIENT
Start: 2017-10-02 | End: 2017-10-12

## 2017-10-02 NOTE — PROGRESS NOTES
Sharma Severin is a 58year old female. Patient presents with:  Sciatica      HPI:   Pt here for lower back pain - sharp radiating pain  to her left leg all the way to ankle.   Pain is 4-5 constant pain that radiates, more with walking, have shooting rocael unlisted Right     knee   • Correct bunion,othr methods Right    • Foot surgery Right 1986    Bunionectomy Bone spur R Foot   • Fracture surgery Left 2015    elbow radial head replacement   • Total knee replacement Right 3/20/17    DR. PATRICK   • Total kn benefit from physical rehab medicine consult as patient has chronic degenerative joint issues. Encourage mind - body exercises.   Might need imaging as patient has not had any imaging in long time,tne one she had in the past was several years ago in a diff

## 2017-10-02 NOTE — PATIENT INSTRUCTIONS
Follow up with physiatrist  Do not take cyclobenzapril while driving  Apply ice over lower back for 20-30 min for fist 2-3 days. Can alternate with heat/ warm application after that. Encourage fluids ,specially when you take ibupofen.   Can take advl/tyle

## 2017-10-02 NOTE — PROGRESS NOTES
Diagnosis:Diagnosis: Primary osteoarthritis of left knee (M17.12)      Authorized # of Visits:  15/25         Next MD visit: 10/10/17  Fall Risk: standard         Precautions: n/a           Medication Changes since last visit?: No  Subjective: Pt reports t 2# x20- NOT TODAY  LAQ with ankle ball squeeze 5\"   X 10  Bosu lunges x20    Man Mob:  12 minutes  IASTM to hamstrings with tools HG8, HG9  Bead roller to hamstrings  FR to glut/ITB/hamstrings  Ice massage to hamstring insertion and post knee on L x 2 min

## 2017-10-05 ENCOUNTER — OFFICE VISIT (OUTPATIENT)
Dept: PHYSICAL THERAPY | Facility: HOSPITAL | Age: 62
End: 2017-10-05
Attending: ORTHOPAEDIC SURGERY
Payer: COMMERCIAL

## 2017-10-05 DIAGNOSIS — M17.11 PRIMARY OSTEOARTHRITIS OF RIGHT KNEE: ICD-10-CM

## 2017-10-05 DIAGNOSIS — Z47.89 ORTHOPEDIC AFTERCARE: ICD-10-CM

## 2017-10-05 PROCEDURE — 97110 THERAPEUTIC EXERCISES: CPT | Performed by: PHYSICAL THERAPIST

## 2017-10-05 NOTE — PROGRESS NOTES
Patient Name: Bessie Islas, : 1955, MRN: [id number]   Date:  10/5/2017  Referring Physician:  Dara Park    Diagnosis: Primary osteoarthritis of left knee (M17.12)       Progress note    Pt has attended 16 visits in Physical Therapy. Patient/Family/Caregiver was advised of these findings, precautions, and treatment options and has agreed to actively participate in planning and for this course of care.     Thank you for your referral. Please co-sign or sign and return this letter via L x2 trials  Prone hang with MHP on hamstrings with 2# x 5  Min  Supine hang on bolster with 3# x  4 min  Supine QS 15 x 10 sec hold   SLR with QS  2 x 10  Seated LAQ with ball squeeze at ankles 10\" hold x 15  TKE's with towel squeeze 10\" hold x 15 with

## 2017-10-06 ENCOUNTER — TELEPHONE (OUTPATIENT)
Dept: NEUROLOGY | Facility: CLINIC | Age: 62
End: 2017-10-06

## 2017-10-06 ENCOUNTER — OFFICE VISIT (OUTPATIENT)
Dept: NEUROLOGY | Facility: CLINIC | Age: 62
End: 2017-10-06

## 2017-10-06 VITALS
BODY MASS INDEX: 23.42 KG/M2 | DIASTOLIC BLOOD PRESSURE: 70 MMHG | WEIGHT: 151 LBS | HEIGHT: 67.5 IN | SYSTOLIC BLOOD PRESSURE: 106 MMHG | RESPIRATION RATE: 15 BRPM | HEART RATE: 90 BPM

## 2017-10-06 DIAGNOSIS — M54.32 SCIATICA OF LEFT SIDE: Primary | ICD-10-CM

## 2017-10-06 PROCEDURE — 99204 OFFICE O/P NEW MOD 45 MIN: CPT | Performed by: PHYSICAL MEDICINE & REHABILITATION

## 2017-10-06 RX ORDER — METHYLPREDNISOLONE 4 MG/1
TABLET ORAL
Qty: 1 PACKAGE | Refills: 0 | Status: SHIPPED | OUTPATIENT
Start: 2017-10-06 | End: 2017-10-16

## 2017-10-06 NOTE — H&P
No referring provider defined for this encounter.   Felicia Eric MD    PHYSICAL MEDICINE and REHABILITATION NEW PATIENT    Chief Complaint: low back pain    HPI: Sol Blandman is a 58year old female with h/o hypothyroidism, anxiety, GERD who presents wit Alleviating factors: medications - was given flexaril at nighttime. Is also taking tylenol. Sitting in a recliner or sidelying on the opposite side. She has tried heat and ice. She has been on prednisone in the past with no reaction.  She has had spinal int Cyclobenzaprine HCl 10 MG Oral Tab Take 1 tablet (10 mg total) by mouth 3 (three) times daily as needed for Muscle spasms.  (Patient taking differently: Take 10 mg by mouth 2 (two) times daily as needed for Muscle spasms.  ) Disp: 30 tablet Rfl: 0   ATORVAS Pain Score: The pain is currently  6/10. General/Mental Status:  female of normal body habitus. Patient appears stated age, answers my questions appropriately.   Psych: Does not appear depressed  Skin: No rash and ulceration   CV: RRR  Resp: CTA Light Touch (LE) Normal Normal  Straight Leg Raise (SLR) (radiculitis) Negative Negative       Seated Slump Test (discogenic/radiculitis) Negative Positive for left sided low back pain       DAMASO (SI joint pain) Negative Negative       Gaenslen's (SI pain She will contact me in 1 week and if imaging is required she will follow up after the imaging has been completed.     Karen Aguayo DO  10:45 AM  10/6/2017

## 2017-10-06 NOTE — TELEPHONE ENCOUNTER
Patient asking if should wait to start medrol dose pack tomorrow. Adivesed to start today and take breakfast and lunch dose together.

## 2017-10-06 NOTE — PATIENT INSTRUCTIONS
1) Take the medrol dosepak as directed. You may continue to take your other medications. 2) Call or message my office in 1 week and let me know how you are doing.  If you still have pain or have inadequate partial pain relief I will order an Xray of the [facility]. · Patient must present photo ID at time of . If a designated family member will be picking up prescription, office must be given name of individual in advance and they must present an ID as well.   · The name of the person picking up your

## 2017-10-09 ENCOUNTER — OFFICE VISIT (OUTPATIENT)
Dept: PHYSICAL THERAPY | Facility: HOSPITAL | Age: 62
End: 2017-10-09
Attending: ORTHOPAEDIC SURGERY
Payer: COMMERCIAL

## 2017-10-09 DIAGNOSIS — M17.11 PRIMARY OSTEOARTHRITIS OF RIGHT KNEE: ICD-10-CM

## 2017-10-09 DIAGNOSIS — Z47.89 ORTHOPEDIC AFTERCARE: ICD-10-CM

## 2017-10-09 PROCEDURE — 97110 THERAPEUTIC EXERCISES: CPT

## 2017-10-09 NOTE — PROGRESS NOTES
Diagnosis:Diagnosis: Primary osteoarthritis of left knee (M17.12)      Authorized # of Visits:  17/25         Next MD visit: 10/10/17  Fall Risk: standard         Precautions: n/a           Medication Changes since last visit?: No  Subjective: Pt reports t jason x20    Man Mob:  12 minutes  IASTM to hamstrings with tools HG8, HG9  Bead roller to hamstrings  FR to glut/ITB/hamstrings  Ice massage to hamstring insertion and post knee on L x 2 min        Assessment:  ROM increased and noted decreased edema abo

## 2017-10-10 ENCOUNTER — TELEPHONE (OUTPATIENT)
Dept: OTHER | Age: 62
End: 2017-10-10

## 2017-10-10 ENCOUNTER — OFFICE VISIT (OUTPATIENT)
Dept: ORTHOPEDICS CLINIC | Facility: CLINIC | Age: 62
End: 2017-10-10

## 2017-10-10 ENCOUNTER — HOSPITAL ENCOUNTER (OUTPATIENT)
Dept: GENERAL RADIOLOGY | Facility: HOSPITAL | Age: 62
Discharge: HOME | End: 2017-10-10
Attending: ORTHOPAEDIC SURGERY
Payer: COMMERCIAL

## 2017-10-10 DIAGNOSIS — Z47.89 ORTHOPEDIC AFTERCARE: ICD-10-CM

## 2017-10-10 DIAGNOSIS — M17.12 PRIMARY OSTEOARTHRITIS OF LEFT KNEE: Primary | ICD-10-CM

## 2017-10-10 DIAGNOSIS — M54.50 LUMBOSACRAL PAIN: Primary | ICD-10-CM

## 2017-10-10 PROCEDURE — 73560 X-RAY EXAM OF KNEE 1 OR 2: CPT | Performed by: ORTHOPAEDIC SURGERY

## 2017-10-10 PROCEDURE — 99212 OFFICE O/P EST SF 10 MIN: CPT | Performed by: ORTHOPAEDIC SURGERY

## 2017-10-10 PROCEDURE — 99024 POSTOP FOLLOW-UP VISIT: CPT | Performed by: ORTHOPAEDIC SURGERY

## 2017-10-10 NOTE — PROGRESS NOTES
This is a pleasant 58-year-old female that is 10 weeks status post left total knee arthroplasty. The patient has had no fevers, chills, or markers of infection. The patient comes in today for repeat evaluation of the left knee.   The patient reports that

## 2017-10-10 NOTE — TELEPHONE ENCOUNTER
To Dr Avni Nevarez,  Patient states that she saw Dr Williams Harper and was given steroid shot but did not work. Patient is schedule to get second steroid shot and need another referral, please advise.

## 2017-10-12 ENCOUNTER — APPOINTMENT (OUTPATIENT)
Dept: PHYSICAL THERAPY | Facility: HOSPITAL | Age: 62
End: 2017-10-12
Attending: INTERNAL MEDICINE
Payer: COMMERCIAL

## 2017-10-12 ENCOUNTER — OFFICE VISIT (OUTPATIENT)
Dept: PHYSICAL THERAPY | Facility: HOSPITAL | Age: 62
End: 2017-10-12
Attending: ORTHOPAEDIC SURGERY
Payer: COMMERCIAL

## 2017-10-12 DIAGNOSIS — Z47.89 ORTHOPEDIC AFTERCARE: ICD-10-CM

## 2017-10-12 DIAGNOSIS — M17.11 PRIMARY OSTEOARTHRITIS OF RIGHT KNEE: ICD-10-CM

## 2017-10-12 PROCEDURE — 97110 THERAPEUTIC EXERCISES: CPT | Performed by: PHYSICAL THERAPIST

## 2017-10-12 PROCEDURE — 97110 THERAPEUTIC EXERCISES: CPT

## 2017-10-12 NOTE — PROGRESS NOTES
Patient Name: Sharma Severin, : 1955, MRN: [id number]   Date:  10/12/2017  Referring Physician:  Dontrell Key    Diagnosis: Primary osteoarthritis of left knee (M17.12)       Discharge note    Pt has attended 18 visits in Physical Therapy

## 2017-10-12 NOTE — PROGRESS NOTES
Diagnosis:Diagnosis: Primary osteoarthritis of left knee (M17.12)      Authorized # of Visits:  18/25         Next MD visit: 10/10/17  Fall Risk: standard         Precautions: n/a           Medication Changes since last visit?: No  Subjective: Pt reports t

## 2017-10-16 ENCOUNTER — APPOINTMENT (OUTPATIENT)
Dept: PHYSICAL THERAPY | Facility: HOSPITAL | Age: 62
End: 2017-10-16
Attending: ORTHOPAEDIC SURGERY
Payer: COMMERCIAL

## 2017-10-16 ENCOUNTER — OFFICE VISIT (OUTPATIENT)
Dept: NEUROLOGY | Facility: CLINIC | Age: 62
End: 2017-10-16

## 2017-10-16 VITALS
RESPIRATION RATE: 16 BRPM | HEIGHT: 67 IN | SYSTOLIC BLOOD PRESSURE: 122 MMHG | BODY MASS INDEX: 23.54 KG/M2 | HEART RATE: 75 BPM | DIASTOLIC BLOOD PRESSURE: 78 MMHG | WEIGHT: 150 LBS

## 2017-10-16 DIAGNOSIS — M54.32 SCIATICA OF LEFT SIDE: Primary | ICD-10-CM

## 2017-10-16 PROCEDURE — 99213 OFFICE O/P EST LOW 20 MIN: CPT | Performed by: PHYSICAL MEDICINE & REHABILITATION

## 2017-10-16 NOTE — PROGRESS NOTES
No referring provider defined for this encounter.   Khadar Bailey MD    Chief Complaint: Left leg pain    HPI:  Andria Carter is a 58year old female with history of hypothyroidism, anxiety, GERD who presents with complaints of Back Pain (pt states she was Other tests: No pain on internal rotation of left hip. Positive pain on external rotation of the left hip. Mild low back pain while holding Cobra pose for 30 seconds, however no radicular left leg pain was noted.

## 2017-10-16 NOTE — PATIENT INSTRUCTIONS
1) Incorporate core exercises into your daily schedule most, if not all days of the week. Please look up the Big 3 exercises by Ulisses Flores (abdominal crunch, planks, and bird dog exercises - skip the bird dog exercise as instructed by Dr. Fidelia Mendenhall).  Star

## 2017-10-26 ENCOUNTER — OFFICE VISIT (OUTPATIENT)
Dept: PHYSICAL THERAPY | Facility: HOSPITAL | Age: 62
End: 2017-10-26
Attending: PHYSICAL MEDICINE & REHABILITATION
Payer: COMMERCIAL

## 2017-10-26 DIAGNOSIS — M54.32 SCIATICA OF LEFT SIDE: ICD-10-CM

## 2017-10-26 PROCEDURE — 97161 PT EVAL LOW COMPLEX 20 MIN: CPT | Performed by: PHYSICAL THERAPIST

## 2017-10-26 PROCEDURE — 97110 THERAPEUTIC EXERCISES: CPT | Performed by: PHYSICAL THERAPIST

## 2017-10-26 NOTE — PROGRESS NOTES
LUMBAR SPINE EVALUATION:   Referring Physician: Dr. Elier Blunt  Diagnosis: Sciatica of left side (M54.32)        Evaluation Date: 10/26/2017  Visit # 1  Scheduled Visits 8  Insurance Authorized visits 8 O   Date of Onset: [id number]              OTNAX 5/5    Ankle DF (L4) 5/5 5/5    EHL (L5)      Ankle PF (S1)      Hip Abduction      Hip Extension          Gait: dec B pelvic rot  Stairs reciprocal gait without UE support      ROM:     Trunk           Flexion        100%                Extension 100%   R participate in planning and for this course of care. Thank you for your referral. Please co-sign or sign and return this letter via fax as soon as possible to [phone].  If you have any questions, please contact me at Dept: [phone]    Sincerely,

## 2017-10-31 ENCOUNTER — OFFICE VISIT (OUTPATIENT)
Dept: PHYSICAL THERAPY | Facility: HOSPITAL | Age: 62
End: 2017-10-31
Attending: PHYSICAL MEDICINE & REHABILITATION
Payer: COMMERCIAL

## 2017-10-31 DIAGNOSIS — M54.32 SCIATICA OF LEFT SIDE: ICD-10-CM

## 2017-10-31 PROCEDURE — 97110 THERAPEUTIC EXERCISES: CPT

## 2017-10-31 NOTE — PROGRESS NOTES
Diagnosis:Sciatica of left side (M54.32)  Authorized # of Visits:  2/8 (O)         Next MD visit: none scheduled  Fall Risk: standard         Precautions: n/a           Medication Changes since last visit?: No  Subjective: Pt states that her back pain fl

## 2017-11-02 ENCOUNTER — OFFICE VISIT (OUTPATIENT)
Dept: PHYSICAL THERAPY | Facility: HOSPITAL | Age: 62
End: 2017-11-02
Attending: PHYSICAL MEDICINE & REHABILITATION
Payer: COMMERCIAL

## 2017-11-02 DIAGNOSIS — M54.32 SCIATICA OF LEFT SIDE: ICD-10-CM

## 2017-11-02 PROCEDURE — 97110 THERAPEUTIC EXERCISES: CPT

## 2017-11-07 ENCOUNTER — OFFICE VISIT (OUTPATIENT)
Dept: PHYSICAL THERAPY | Facility: HOSPITAL | Age: 62
End: 2017-11-07
Attending: PHYSICAL MEDICINE & REHABILITATION
Payer: COMMERCIAL

## 2017-11-07 DIAGNOSIS — M54.32 SCIATICA OF LEFT SIDE: ICD-10-CM

## 2017-11-07 PROCEDURE — 97110 THERAPEUTIC EXERCISES: CPT

## 2017-11-07 NOTE — PROGRESS NOTES
Diagnosis:Sciatica of left side (M54.32)  Authorized # of Visits:  4/8 (O)         Next MD visit: none scheduled  Fall Risk: standard         Precautions: n/a           Medication Changes since last visit?: No  Subjective: Pt states that she her back is

## 2017-11-09 ENCOUNTER — APPOINTMENT (OUTPATIENT)
Dept: PHYSICAL THERAPY | Facility: HOSPITAL | Age: 62
End: 2017-11-09
Attending: PHYSICAL MEDICINE & REHABILITATION
Payer: COMMERCIAL

## 2017-11-13 ENCOUNTER — APPOINTMENT (OUTPATIENT)
Dept: PHYSICAL THERAPY | Facility: HOSPITAL | Age: 62
End: 2017-11-13
Attending: PHYSICAL MEDICINE & REHABILITATION
Payer: COMMERCIAL

## 2017-11-16 ENCOUNTER — APPOINTMENT (OUTPATIENT)
Dept: PHYSICAL THERAPY | Facility: HOSPITAL | Age: 62
End: 2017-11-16
Attending: PHYSICAL MEDICINE & REHABILITATION
Payer: COMMERCIAL

## 2017-11-20 ENCOUNTER — APPOINTMENT (OUTPATIENT)
Dept: PHYSICAL THERAPY | Facility: HOSPITAL | Age: 62
End: 2017-11-20
Attending: PHYSICAL MEDICINE & REHABILITATION
Payer: COMMERCIAL

## 2017-11-21 ENCOUNTER — OFFICE VISIT (OUTPATIENT)
Dept: PHYSICAL THERAPY | Facility: HOSPITAL | Age: 62
End: 2017-11-21
Attending: PHYSICAL MEDICINE & REHABILITATION
Payer: COMMERCIAL

## 2017-11-21 PROCEDURE — 97110 THERAPEUTIC EXERCISES: CPT | Performed by: PHYSICAL THERAPIST

## 2017-11-21 NOTE — PROGRESS NOTES
Diagnosis:Sciatica of left side (M54.32)  Authorized # of Visits:  5/8 (O)         Next MD visit: none scheduled  Fall Risk: standard         Precautions: n/a           Medication Changes since last visit?: No  Subjective: Pt states that she hasn't been

## 2017-11-28 ENCOUNTER — OFFICE VISIT (OUTPATIENT)
Dept: PHYSICAL THERAPY | Facility: HOSPITAL | Age: 62
End: 2017-11-28
Attending: PHYSICAL MEDICINE & REHABILITATION
Payer: COMMERCIAL

## 2017-11-28 DIAGNOSIS — M54.32 SCIATICA OF LEFT SIDE: ICD-10-CM

## 2017-11-28 PROCEDURE — 97110 THERAPEUTIC EXERCISES: CPT | Performed by: PHYSICAL THERAPIST

## 2017-11-28 NOTE — PROGRESS NOTES
Diagnosis:Sciatica of left side (M54.32)  Authorized # of Visits:  6/8 (HMO)         Next MD visit: none scheduled  Fall Risk: standard         Precautions: n/a           Medication Changes since last visit?: No  Subjective: Pt states that she has been doi

## 2017-11-30 ENCOUNTER — OFFICE VISIT (OUTPATIENT)
Dept: PHYSICAL THERAPY | Facility: HOSPITAL | Age: 62
End: 2017-11-30
Attending: PHYSICAL MEDICINE & REHABILITATION
Payer: COMMERCIAL

## 2017-11-30 DIAGNOSIS — M54.32 SCIATICA OF LEFT SIDE: ICD-10-CM

## 2017-11-30 PROCEDURE — 97530 THERAPEUTIC ACTIVITIES: CPT | Performed by: PHYSICAL THERAPIST

## 2017-11-30 PROCEDURE — 97110 THERAPEUTIC EXERCISES: CPT | Performed by: PHYSICAL THERAPIST

## 2017-11-30 NOTE — PROGRESS NOTES
Patient Name: Alejandro Mcdaniel, : 1955, MRN: [id number]   Date:  2017  Referring Physician:  Delfin Faustin    Diagnosis: Sciatica of left side (M54.32)    Discharge note    Pt has attended 7 visits in Physical Therapy.      Progress Note Sta summary    Pain 2/10 LBP going into the L glut      Objective:       Therapeutic Exercises:  Lifting edu with return demo of 15# box  Ergonomics of common ADLs     Prone lying x 5 min (dec, B)  Prone on Elbows x5 min (dec, B)  PPT 2 x 15   5\" hold  [employer]

## 2017-12-05 NOTE — TELEPHONE ENCOUNTER
Protocol failed, please advise on prescription request . Please reply to pool: EM RN TRIAGE  Cholesterol Medications  Protocol Criteria:  · Appointment scheduled in the past 12 months or in the next 3 months  · ALT & LDL on file in the past 12 months  ·

## 2017-12-06 RX ORDER — ATORVASTATIN CALCIUM 40 MG/1
TABLET, FILM COATED ORAL
Qty: 90 TABLET | Refills: 0 | Status: SHIPPED | OUTPATIENT
Start: 2017-12-06 | End: 2018-03-11

## 2018-01-12 ENCOUNTER — OFFICE VISIT (OUTPATIENT)
Dept: INTERNAL MEDICINE CLINIC | Facility: CLINIC | Age: 63
End: 2018-01-12

## 2018-01-12 VITALS
WEIGHT: 157 LBS | HEIGHT: 67 IN | HEART RATE: 55 BPM | SYSTOLIC BLOOD PRESSURE: 154 MMHG | TEMPERATURE: 97 F | BODY MASS INDEX: 24.64 KG/M2 | DIASTOLIC BLOOD PRESSURE: 87 MMHG

## 2018-01-12 DIAGNOSIS — H93.8X1 CONGESTION OF RIGHT EAR: Primary | ICD-10-CM

## 2018-01-12 PROCEDURE — 99212 OFFICE O/P EST SF 10 MIN: CPT | Performed by: INTERNAL MEDICINE

## 2018-01-12 PROCEDURE — 99213 OFFICE O/P EST LOW 20 MIN: CPT | Performed by: INTERNAL MEDICINE

## 2018-01-12 RX ORDER — AZITHROMYCIN 250 MG/1
TABLET, FILM COATED ORAL
Qty: 6 TABLET | Refills: 0 | Status: SHIPPED | OUTPATIENT
Start: 2018-01-12 | End: 2018-01-15

## 2018-01-12 RX ORDER — CLINDAMYCIN HYDROCHLORIDE 300 MG/1
CAPSULE ORAL
Refills: 0 | COMMUNITY
Start: 2017-11-21 | End: 2018-03-20

## 2018-01-13 NOTE — PROGRESS NOTES
Patient ID: Terri Cummings is a 58year old female. Patient presents with:  Ear Pain: R ear pain x4 days, also feels itchy       HISTORY OF PRESENT ILLNESS:   HPI  Patient presents for above.   States she thinks she is having the beginnings of an ear in 250 MG Oral Tab, Take two tablets by mouth today, then one daily. , Disp: 6 tablet, Rfl: 0  •  ATORVASTATIN 40 MG Oral Tab, TAKE 1 TABLET BY MOUTH EVERY DAY, Disp: 90 tablet, Rfl: 0  •  QUEtiapine Fumarate (SEROQUEL) 25 MG Oral Tab, Take 25 mg by mouth pretty respiratory distress. Lymphadenopathy:        Head (right side): No submental, no submandibular, no tonsillar, no preauricular, no posterior auricular and no occipital adenopathy present.         Head (left side): No submental, no submandibular, no tonsil

## 2018-01-15 ENCOUNTER — TELEPHONE (OUTPATIENT)
Dept: INTERNAL MEDICINE CLINIC | Facility: CLINIC | Age: 63
End: 2018-01-15

## 2018-01-15 RX ORDER — LEVOFLOXACIN 500 MG/1
500 TABLET, FILM COATED ORAL DAILY
Qty: 5 TABLET | Refills: 0 | Status: SHIPPED | OUTPATIENT
Start: 2018-01-15 | End: 2018-01-20

## 2018-01-15 NOTE — TELEPHONE ENCOUNTER
Per [facility] pharmacist [facility] indicated that levaquin has the same Prolonged QT syndrome. Please advise.

## 2018-01-15 NOTE — TELEPHONE ENCOUNTER
I attempted to call the patient. No answer. If it is possible for her to stop Seroquel for 5 days while she is on antibiotic is preferable, if not try taking half pill of seroquel.  Please inform

## 2018-01-15 NOTE — TELEPHONE ENCOUNTER
Dr Sejal Killian for Dr Gabriele Marin, please review and advise azithromycin/seroquel interraction prolonged qt syndrome  Please reply to pool: LETY Arellano

## 2018-01-15 NOTE — TELEPHONE ENCOUNTER
Pharmacy calling regarding there being an interaction with azithromycin 250 MG Oral Tab and QUEtiapine Fumarate (SEROQUEL) 25 MG Oral Tab. .. please advise

## 2018-01-15 NOTE — TELEPHONE ENCOUNTER
LMTCB please transfer to triage RN  Notified pharmacy, stop zpak, hold levaquin rx until we talk to the patient

## 2018-01-19 ENCOUNTER — TELEPHONE (OUTPATIENT)
Dept: OTHER | Age: 63
End: 2018-01-19

## 2018-01-19 NOTE — TELEPHONE ENCOUNTER
Roderick Lara from [employer] and stating the there is a drug interaction regarding levaquin and seroquiel, advised that there is an order to hold the levaquin, needs to talk to the patient first, requesting a call back today to clarify the medicine.

## 2018-01-23 NOTE — TELEPHONE ENCOUNTER
LMTCB please transfer to triage. Thanks  I also called emergency contact Ricardo Durham and left a message to call us back. Please see Julito Oro message below.

## 2018-01-23 NOTE — TELEPHONE ENCOUNTER
Patient called from [facility], having a wonderful vacation. The ear infection that she was worried about did not occur and she is having no problems at all. [facility] notified, antibiotic prescription cancelled.

## 2018-03-12 RX ORDER — ATORVASTATIN CALCIUM 40 MG/1
TABLET, FILM COATED ORAL
Qty: 90 TABLET | Refills: 0 | Status: SHIPPED | OUTPATIENT
Start: 2018-03-12 | End: 2018-06-14

## 2018-03-12 NOTE — TELEPHONE ENCOUNTER
Please advise on refill request.   Last labs 3/2017.    Last physical 7/21/17 with Dr. Anastasia Ferrera      Cholesterol Medications  Protocol Criteria:  · Appointment scheduled in the past 12 months or in the next 3 months  · ALT & LDL on file in the past 12 months

## 2018-03-12 NOTE — TELEPHONE ENCOUNTER
Please inform the patient that medication is refilled. She is due for annual physical.  Please schedule the appointment.

## 2018-03-14 NOTE — TELEPHONE ENCOUNTER
Pt returned call and scheduled PX appt with Dr. Ama Castanon, 3/20 at [facility].

## 2018-03-20 ENCOUNTER — OFFICE VISIT (OUTPATIENT)
Dept: INTERNAL MEDICINE CLINIC | Facility: CLINIC | Age: 63
End: 2018-03-20

## 2018-03-20 VITALS
BODY MASS INDEX: 24.64 KG/M2 | SYSTOLIC BLOOD PRESSURE: 117 MMHG | WEIGHT: 157 LBS | HEIGHT: 67 IN | HEART RATE: 70 BPM | TEMPERATURE: 98 F | DIASTOLIC BLOOD PRESSURE: 77 MMHG

## 2018-03-20 DIAGNOSIS — Z12.11 ENCOUNTER FOR SCREENING COLONOSCOPY: ICD-10-CM

## 2018-03-20 DIAGNOSIS — Z00.00 ANNUAL PHYSICAL EXAM: ICD-10-CM

## 2018-03-20 DIAGNOSIS — Z12.39 SCREENING FOR BREAST CANCER: ICD-10-CM

## 2018-03-20 DIAGNOSIS — Z82.49 FAMILY HISTORY OF EARLY CAD: ICD-10-CM

## 2018-03-20 PROCEDURE — 99396 PREV VISIT EST AGE 40-64: CPT | Performed by: INTERNAL MEDICINE

## 2018-03-20 PROCEDURE — 99212 OFFICE O/P EST SF 10 MIN: CPT | Performed by: INTERNAL MEDICINE

## 2018-03-20 NOTE — PROGRESS NOTES
Elías Vincent is a 58year old female. Patient presents with:  Physical      HPI:   Patient is here for annual physical exam.  Her medical hx include cervical radiculopathy, lumbar radiculopathy, Anxiety, insomnia, seasonal allergies.  She denies any co per week     Comment: social       REVIEW OF SYSTEMS:   GENERAL HEALTH: No fevers, chills, sweats, fatigue. VISION: No recent vision problems, blurry vision or double vision. HEENT: No decreased hearing, ear pain, nasal congestion or sore throat.   SKIN: paraspinal tenderness, no flank tenderness. NUERO: Normal mood and affect.  No gross sensory or motor deficits, memory normal.         ASSESSMENT AND PLAN:   Diagnoses and all orders for this visit:    Annual physical exam  Unremarkable physical exam.  Lab

## 2018-04-05 ENCOUNTER — LAB ENCOUNTER (OUTPATIENT)
Dept: LAB | Age: 63
End: 2018-04-05
Attending: INTERNAL MEDICINE
Payer: COMMERCIAL

## 2018-04-05 DIAGNOSIS — Z00.00 ANNUAL PHYSICAL EXAM: ICD-10-CM

## 2018-04-05 PROCEDURE — 82306 VITAMIN D 25 HYDROXY: CPT

## 2018-04-05 PROCEDURE — 80050 GENERAL HEALTH PANEL: CPT

## 2018-04-05 PROCEDURE — 80061 LIPID PANEL: CPT

## 2018-04-05 PROCEDURE — 85025 COMPLETE CBC W/AUTO DIFF WBC: CPT

## 2018-04-05 PROCEDURE — 84439 ASSAY OF FREE THYROXINE: CPT

## 2018-04-05 PROCEDURE — 83036 HEMOGLOBIN GLYCOSYLATED A1C: CPT

## 2018-04-05 PROCEDURE — 36415 COLL VENOUS BLD VENIPUNCTURE: CPT

## 2018-04-05 PROCEDURE — 80053 COMPREHEN METABOLIC PANEL: CPT

## 2018-04-13 ENCOUNTER — HOSPITAL ENCOUNTER (OUTPATIENT)
Dept: MAMMOGRAPHY | Facility: HOSPITAL | Age: 63
Discharge: HOME | End: 2018-04-13
Attending: INTERNAL MEDICINE
Payer: COMMERCIAL

## 2018-04-13 DIAGNOSIS — Z12.39 SCREENING FOR BREAST CANCER: ICD-10-CM

## 2018-04-13 PROCEDURE — 77067 SCR MAMMO BI INCL CAD: CPT | Performed by: INTERNAL MEDICINE

## 2018-05-02 ENCOUNTER — HOSPITAL ENCOUNTER (OUTPATIENT)
Dept: GENERAL RADIOLOGY | Facility: HOSPITAL | Age: 63
Discharge: HOME | End: 2018-05-02
Attending: INTERNAL MEDICINE
Payer: COMMERCIAL

## 2018-05-02 ENCOUNTER — OFFICE VISIT (OUTPATIENT)
Dept: INTERNAL MEDICINE CLINIC | Facility: CLINIC | Age: 63
End: 2018-05-02

## 2018-05-02 VITALS
SYSTOLIC BLOOD PRESSURE: 110 MMHG | BODY MASS INDEX: 25.06 KG/M2 | HEIGHT: 67 IN | TEMPERATURE: 98 F | RESPIRATION RATE: 20 BRPM | HEART RATE: 60 BPM | WEIGHT: 159.63 LBS | DIASTOLIC BLOOD PRESSURE: 76 MMHG

## 2018-05-02 DIAGNOSIS — M54.42 CHRONIC LEFT-SIDED LOW BACK PAIN WITH LEFT-SIDED SCIATICA: Primary | ICD-10-CM

## 2018-05-02 DIAGNOSIS — M25.552 PAIN OF BOTH HIP JOINTS: ICD-10-CM

## 2018-05-02 DIAGNOSIS — M25.551 PAIN OF BOTH HIP JOINTS: ICD-10-CM

## 2018-05-02 DIAGNOSIS — G89.29 CHRONIC LEFT-SIDED LOW BACK PAIN WITH LEFT-SIDED SCIATICA: Primary | ICD-10-CM

## 2018-05-02 PROBLEM — Z96.653 HISTORY OF BILATERAL KNEE REPLACEMENT: Status: ACTIVE | Noted: 2018-05-02

## 2018-05-02 PROCEDURE — 73523 X-RAY EXAM HIPS BI 5/> VIEWS: CPT | Performed by: INTERNAL MEDICINE

## 2018-05-02 PROCEDURE — 99212 OFFICE O/P EST SF 10 MIN: CPT | Performed by: INTERNAL MEDICINE

## 2018-05-02 PROCEDURE — 99213 OFFICE O/P EST LOW 20 MIN: CPT | Performed by: INTERNAL MEDICINE

## 2018-05-02 NOTE — PROGRESS NOTES
Kory Montoya is a 58year old female. Patient presents with:  Hip Pain      HPI:   She is here for Left hip pain. She had  physical therapy for chronic low back pain with left-sided sciatica. She was seen by Dr. Kike Whitney.   There is low back pain and l Disp:  Rfl:    Escitalopram Oxalate (LEXAPRO) 20 MG Oral Tab Take 40 mg by mouth daily.    Disp:  Rfl: 0      Past Medical History:   Diagnosis Date   • Anxiety    • Anxiety state    • Disorder of thyroid     HYPOTHYROID   • Elbow fracture, left    • GERD ( Pulse 60   Temp 97.6 °F (36.4 °C) (Oral)   Resp 20   Ht 5' 7\" (1.702 m)   Wt 159 lb 9.6 oz (72.4 kg)   BMI 25.00 kg/m²   Physical Exam   Constitutional: She is oriented to person, place, and time. She appears well-developed and well-nourished.  No distress

## 2018-05-07 ENCOUNTER — TELEPHONE (OUTPATIENT)
Dept: OTHER | Age: 63
End: 2018-05-07

## 2018-05-07 DIAGNOSIS — M54.42 CHRONIC LEFT-SIDED LOW BACK PAIN WITH LEFT-SIDED SCIATICA: Primary | ICD-10-CM

## 2018-05-07 DIAGNOSIS — G89.29 CHRONIC LEFT-SIDED LOW BACK PAIN WITH LEFT-SIDED SCIATICA: Primary | ICD-10-CM

## 2018-05-07 NOTE — TELEPHONE ENCOUNTER
Pt states she was advised to see Dr. Cotto Samples and is unable to get an appt until June 14th, pt is asking if doctor would order MRI as discussed at [address] Please advise.

## 2018-05-09 NOTE — TELEPHONE ENCOUNTER
Patient calling - verified patient's name and  - informed patient of doctor's note, provided number for CS - patient verbalized understanding.

## 2018-05-24 ENCOUNTER — HOSPITAL ENCOUNTER (OUTPATIENT)
Dept: MRI IMAGING | Age: 63
Discharge: HOME | End: 2018-05-24
Attending: INTERNAL MEDICINE
Payer: COMMERCIAL

## 2018-05-24 DIAGNOSIS — G89.29 CHRONIC LEFT-SIDED LOW BACK PAIN WITH LEFT-SIDED SCIATICA: ICD-10-CM

## 2018-05-24 DIAGNOSIS — M54.42 CHRONIC LEFT-SIDED LOW BACK PAIN WITH LEFT-SIDED SCIATICA: ICD-10-CM

## 2018-05-24 PROCEDURE — 72148 MRI LUMBAR SPINE W/O DYE: CPT | Performed by: INTERNAL MEDICINE

## 2018-06-04 ENCOUNTER — TELEPHONE (OUTPATIENT)
Dept: INTERNAL MEDICINE CLINIC | Facility: CLINIC | Age: 63
End: 2018-06-04

## 2018-06-04 NOTE — TELEPHONE ENCOUNTER
Spoke with patient, advised to call Medical Records. She was driving, told her would send [employer] message with phone # for Medical Records; she agreed.

## 2018-06-15 RX ORDER — ATORVASTATIN CALCIUM 40 MG/1
TABLET, FILM COATED ORAL
Qty: 90 TABLET | Refills: 0 | Status: SHIPPED | OUTPATIENT
Start: 2018-06-15 | End: 2018-09-21

## 2018-06-15 NOTE — TELEPHONE ENCOUNTER
Cholesterol Medications  Protocol Criteria:  · Appointment scheduled in the past 12 months or in the next 3 months  · ALT & LDL on file in the past 12 months  · ALT result < 80  · LDL result <130   Recent Outpatient Visits            1 month ago Chronic le

## 2018-08-10 ENCOUNTER — HOSPITAL ENCOUNTER (OUTPATIENT)
Dept: GENERAL RADIOLOGY | Facility: HOSPITAL | Age: 63
Discharge: HOME | End: 2018-08-10
Attending: ORTHOPAEDIC SURGERY
Payer: COMMERCIAL

## 2018-08-10 ENCOUNTER — OFFICE VISIT (OUTPATIENT)
Dept: ORTHOPEDICS CLINIC | Facility: CLINIC | Age: 63
End: 2018-08-10
Payer: COMMERCIAL

## 2018-08-10 DIAGNOSIS — Z47.89 ORTHOPEDIC AFTERCARE: ICD-10-CM

## 2018-08-10 DIAGNOSIS — M17.12 PRIMARY OSTEOARTHRITIS OF LEFT KNEE: Primary | ICD-10-CM

## 2018-08-10 DIAGNOSIS — M17.11 PRIMARY OSTEOARTHRITIS OF RIGHT KNEE: ICD-10-CM

## 2018-08-10 PROCEDURE — 99213 OFFICE O/P EST LOW 20 MIN: CPT | Performed by: ORTHOPAEDIC SURGERY

## 2018-08-10 PROCEDURE — 99212 OFFICE O/P EST SF 10 MIN: CPT | Performed by: ORTHOPAEDIC SURGERY

## 2018-08-10 PROCEDURE — 73564 X-RAY EXAM KNEE 4 OR MORE: CPT | Performed by: ORTHOPAEDIC SURGERY

## 2018-08-10 NOTE — PROGRESS NOTES
This is a pleasant 19-year-old female that is 1.5 years status post right total knee arthroplasty and one year status post left total knee arthroplasty. The patient has had no fevers or chills, chest pain, shortness of breath.   She comes in today for repe

## 2018-08-18 ENCOUNTER — LAB ENCOUNTER (OUTPATIENT)
Dept: LAB | Age: 63
End: 2018-08-18
Attending: INTERNAL MEDICINE
Payer: COMMERCIAL

## 2018-08-18 DIAGNOSIS — D70.9 NEUTROPENIA, UNSPECIFIED TYPE (HCC): ICD-10-CM

## 2018-08-18 LAB
BASOPHILS # BLD: 0 K/UL (ref 0–0.2)
BASOPHILS NFR BLD: 1 %
EOSINOPHIL # BLD: 0 K/UL (ref 0–0.7)
EOSINOPHIL NFR BLD: 1 %
ERYTHROCYTE [DISTWIDTH] IN BLOOD BY AUTOMATED COUNT: 12.6 % (ref 11–15)
HCT VFR BLD AUTO: 40.2 % (ref 35–48)
HGB BLD-MCNC: 13.8 G/DL (ref 12–16)
LYMPHOCYTES # BLD: 1.5 K/UL (ref 1–4)
LYMPHOCYTES NFR BLD: 33 %
MCH RBC QN AUTO: 33 PG (ref 27–32)
MCHC RBC AUTO-ENTMCNC: 34.3 G/DL (ref 32–37)
MCV RBC AUTO: 96 FL (ref 80–100)
MONOCYTES # BLD: 0.4 K/UL (ref 0–1)
MONOCYTES NFR BLD: 10 %
NEUTROPHILS # BLD AUTO: 2.5 K/UL (ref 1.8–7.7)
NEUTROPHILS NFR BLD: 55 %
PLATELET # BLD AUTO: 265 K/UL (ref 140–400)
PMV BLD AUTO: 7.1 FL (ref 7.4–10.3)
RBC # BLD AUTO: 4.19 M/UL (ref 3.7–5.4)
WBC # BLD AUTO: 4.5 K/UL (ref 4–11)

## 2018-08-18 PROCEDURE — 85025 COMPLETE CBC W/AUTO DIFF WBC: CPT

## 2018-08-18 PROCEDURE — 36415 COLL VENOUS BLD VENIPUNCTURE: CPT

## 2018-09-21 RX ORDER — ATORVASTATIN CALCIUM 40 MG/1
TABLET, FILM COATED ORAL
Qty: 90 TABLET | Refills: 0 | Status: SHIPPED | OUTPATIENT
Start: 2018-09-21 | End: 2018-12-28

## 2018-10-18 ENCOUNTER — OFFICE VISIT (OUTPATIENT)
Dept: INTERNAL MEDICINE CLINIC | Facility: CLINIC | Age: 63
End: 2018-10-18
Payer: COMMERCIAL

## 2018-10-18 VITALS
HEIGHT: 67 IN | TEMPERATURE: 98 F | HEART RATE: 64 BPM | WEIGHT: 158.81 LBS | SYSTOLIC BLOOD PRESSURE: 124 MMHG | DIASTOLIC BLOOD PRESSURE: 81 MMHG | BODY MASS INDEX: 24.92 KG/M2

## 2018-10-18 DIAGNOSIS — R05.9 COUGH: Primary | ICD-10-CM

## 2018-10-18 PROCEDURE — 99212 OFFICE O/P EST SF 10 MIN: CPT | Performed by: INTERNAL MEDICINE

## 2018-10-18 PROCEDURE — 99213 OFFICE O/P EST LOW 20 MIN: CPT | Performed by: INTERNAL MEDICINE

## 2018-10-18 RX ORDER — PREDNISONE 1 MG/1
5 TABLET ORAL DAILY
Qty: 15 TABLET | Refills: 0 | Status: SHIPPED | OUTPATIENT
Start: 2018-10-18 | End: 2019-02-25 | Stop reason: ALTCHOICE

## 2018-10-18 RX ORDER — CODEINE PHOSPHATE AND GUAIFENESIN 10; 100 MG/5ML; MG/5ML
5 SOLUTION ORAL 2 TIMES DAILY PRN
Qty: 118 ML | Refills: 0 | Status: SHIPPED | OUTPATIENT
Start: 2018-10-18 | End: 2019-02-25 | Stop reason: ALTCHOICE

## 2018-10-18 NOTE — PROGRESS NOTES
Tammi Redd is a 61year old female.   Patient presents with:  Cough: pt c/o congestion, runny nose and loss of voice x 1month      HPI:     HPI  Patient is a 54-year-old female with history of hyperlipidemia and  cervicalgia here with the complaints o Past Surgical History:   Procedure Laterality Date   • Arthroscopy of joint unlisted Left     knee   • Arthroscopy of joint unlisted Right     knee   • Correct bunion,othr methods Right    • Foot surgery Right 1986    Bunionectomy Bone spur R Foot   • Normocephalic and atraumatic. Right Ear: Tympanic membrane normal.   Left Ear: Tympanic membrane normal.   Nose: No mucosal edema or rhinorrhea. Right sinus exhibits no maxillary sinus tenderness and no frontal sinus tenderness.  Left sinus exhibits no ma

## 2018-12-28 RX ORDER — ATORVASTATIN CALCIUM 40 MG/1
TABLET, FILM COATED ORAL
Qty: 90 TABLET | Refills: 0 | Status: SHIPPED | OUTPATIENT
Start: 2018-12-28 | End: 2019-03-27

## 2018-12-29 NOTE — TELEPHONE ENCOUNTER
Refill passed per [facility] protocol.   Cholesterol Medications  Protocol Criteria:  · Appointment scheduled in the past 12 months or in the next 3 months  · ALT & LDL on file in the past 12 months  · ALT result < 80  · LDL result <130   Recent Outpat

## 2019-02-25 ENCOUNTER — OFFICE VISIT (OUTPATIENT)
Dept: INTERNAL MEDICINE CLINIC | Facility: CLINIC | Age: 64
End: 2019-02-25
Payer: COMMERCIAL

## 2019-02-25 VITALS
WEIGHT: 165.38 LBS | HEART RATE: 61 BPM | SYSTOLIC BLOOD PRESSURE: 132 MMHG | TEMPERATURE: 99 F | DIASTOLIC BLOOD PRESSURE: 87 MMHG | BODY MASS INDEX: 25.96 KG/M2 | HEIGHT: 67 IN

## 2019-02-25 DIAGNOSIS — Z00.00 ANNUAL PHYSICAL EXAM: Primary | ICD-10-CM

## 2019-02-25 DIAGNOSIS — Z12.39 SCREENING FOR BREAST CANCER: ICD-10-CM

## 2019-02-25 DIAGNOSIS — Z12.11 SCREENING FOR COLON CANCER: ICD-10-CM

## 2019-02-25 PROCEDURE — 99396 PREV VISIT EST AGE 40-64: CPT | Performed by: INTERNAL MEDICINE

## 2019-02-25 NOTE — PROGRESS NOTES
Emily Leonard is a 61year old female. Patient presents with:  Physical      HPI:     HPI  Patient is here for physical.  Overall doing well. No new complaints.     LOV 10/18/18  Has been getting physical therapy, steroid injection x2 and had seen Nereida Lizama Glasses of wine per week      Comment: social    Drug use: No       REVIEW OF SYSTEMS:   Review of Systems   Constitutional: Negative for chills, fever, malaise/fatigue and weight loss. HENT: Negative for congestion, sinus pain, sore throat and tinnitus. Right breast exhibits no inverted nipple, no mass, no nipple discharge, no skin change and no tenderness. Left breast exhibits no inverted nipple, no mass, no nipple discharge, no skin change and no tenderness. Breasts are symmetrical.   Abdominal: Soft.  B colon cancer  -     OCCULT BLOOD, FECAL, IMMUNOASSAY; Future    Discussed  USPTF screening guidelines for individuals more than 48years of age for colon cancer screening.   Discussed about the pros and cons of colonoscopy and stool test.. Explained the pro

## 2019-03-27 RX ORDER — ATORVASTATIN CALCIUM 40 MG/1
TABLET, FILM COATED ORAL
Qty: 90 TABLET | Refills: 0 | Status: SHIPPED | OUTPATIENT
Start: 2019-03-27 | End: 2019-06-28

## 2019-03-27 NOTE — TELEPHONE ENCOUNTER
Cholesterol Medications  Protocol Criteria:  · Appointment scheduled in the past 12 months or in the next 3 months  · ALT & LDL on file in the past 12 months  · ALT result < 80  · LDL result <130   Recent Outpatient Visits            1 month ago Annual phy

## 2019-05-12 ENCOUNTER — APPOINTMENT (OUTPATIENT)
Dept: LAB | Facility: HOSPITAL | Age: 64
End: 2019-05-12
Attending: INTERNAL MEDICINE
Payer: COMMERCIAL

## 2019-05-25 ENCOUNTER — LAB ENCOUNTER (OUTPATIENT)
Dept: LAB | Age: 64
End: 2019-05-25
Attending: INTERNAL MEDICINE
Payer: COMMERCIAL

## 2019-05-25 DIAGNOSIS — Z00.00 ANNUAL PHYSICAL EXAM: ICD-10-CM

## 2019-05-25 PROCEDURE — 80061 LIPID PANEL: CPT

## 2019-05-25 PROCEDURE — 80053 COMPREHEN METABOLIC PANEL: CPT

## 2019-05-25 PROCEDURE — 84443 ASSAY THYROID STIM HORMONE: CPT

## 2019-05-25 PROCEDURE — 85025 COMPLETE CBC W/AUTO DIFF WBC: CPT

## 2019-05-25 PROCEDURE — 36415 COLL VENOUS BLD VENIPUNCTURE: CPT

## 2019-06-02 ENCOUNTER — HOSPITAL ENCOUNTER (OUTPATIENT)
Dept: MAMMOGRAPHY | Facility: HOSPITAL | Age: 64
Discharge: HOME | End: 2019-06-02
Attending: INTERNAL MEDICINE
Payer: COMMERCIAL

## 2019-06-02 DIAGNOSIS — Z12.39 SCREENING FOR BREAST CANCER: ICD-10-CM

## 2019-06-02 PROCEDURE — 77063 BREAST TOMOSYNTHESIS BI: CPT | Performed by: INTERNAL MEDICINE

## 2019-06-02 PROCEDURE — 77067 SCR MAMMO BI INCL CAD: CPT | Performed by: INTERNAL MEDICINE

## 2019-06-28 RX ORDER — ATORVASTATIN CALCIUM 40 MG/1
TABLET, FILM COATED ORAL
Qty: 90 TABLET | Refills: 1 | Status: SHIPPED | OUTPATIENT
Start: 2019-06-28 | End: 2019-12-23

## 2019-06-28 NOTE — TELEPHONE ENCOUNTER
Refill passed per [facility], [facility] protocol.     Cholesterol Medications  Protocol Criteria:  · Appointment scheduled in the past 12 months or in the next 3 months  · ALT & LDL on file in the past 12 months  · ALT result < 80  · LDL result <130   Recent Outp

## 2019-07-05 ENCOUNTER — TELEPHONE (OUTPATIENT)
Dept: OTHER | Age: 64
End: 2019-07-05

## 2019-07-05 NOTE — TELEPHONE ENCOUNTER
Ke[place] pt stated that she received her mammogram report and is concern about what the report says: The patient stated that it says she is a candidate for a whole breast ultrasound. Pt stated that for her peace of mind she will like to have it done.  Please

## 2019-07-05 NOTE — TELEPHONE ENCOUNTER
Please inform that she can go ahead and schedule whole breast US. Do not be worried.  For better idea and to be sure she can do the US breast. My understanding is she does not need an order Please confirm with [address]

## 2019-07-05 NOTE — TELEPHONE ENCOUNTER
Informed pt Dr. Pizano Fret message below. Pt verbalized understanding.  Phone number to schedule US left on voicemail per pt request

## 2019-11-11 NOTE — PROGRESS NOTES
This is a pleasant 57-year-old female that is 2 weeks status post right total knee arthroplasty. The patient has had no fevers, chills, or markers of infection. The patient is taking Norco for pain control and Xarelto for anticoagulation.   The patient co no apparent

## 2019-11-26 ENCOUNTER — HOSPITAL ENCOUNTER (OUTPATIENT)
Dept: MRI IMAGING | Age: 64
Discharge: HOME | End: 2019-11-26
Attending: NEUROLOGICAL SURGERY
Payer: COMMERCIAL

## 2019-11-26 DIAGNOSIS — M47.12 CERVICAL SPONDYLOSIS WITH MYELOPATHY: ICD-10-CM

## 2019-11-26 DIAGNOSIS — M47.26 OTHER SPONDYLOSIS WITH RADICULOPATHY, LUMBAR REGION: ICD-10-CM

## 2019-11-26 PROCEDURE — 72141 MRI NECK SPINE W/O DYE: CPT | Performed by: NEUROLOGICAL SURGERY

## 2019-11-26 PROCEDURE — 72148 MRI LUMBAR SPINE W/O DYE: CPT | Performed by: NEUROLOGICAL SURGERY

## 2019-12-23 NOTE — TELEPHONE ENCOUNTER
Current Outpatient Medications   Medication Sig Dispense Refill   • ATORVASTATIN 40 MG Oral Tab TAKE 1 TABLET BY MOUTH EVERY DAY 90 tablet 1

## 2019-12-24 RX ORDER — ATORVASTATIN CALCIUM 40 MG/1
40 TABLET, FILM COATED ORAL
Qty: 90 TABLET | Refills: 1 | Status: SHIPPED | OUTPATIENT
Start: 2019-12-24 | End: 2020-06-25

## 2019-12-24 NOTE — TELEPHONE ENCOUNTER
COMFORT please assist with establishing pcp and scheduling appt with new pcp, thanks    Sent to Grace Smiley for sign-off d/t provider no longer available (passed protocol)    Refill passed per [facility], [facility] protocol.   Cholesterol Medications  Protocol Criteri

## 2019-12-24 NOTE — TELEPHONE ENCOUNTER
Debra Arnold MD 8 minutes ago (12:37 PM)         She needs appointment. I have approved the medication.   Thank you

## 2020-01-22 NOTE — TELEPHONE ENCOUNTER
No upcoming appointments found. [employer]t message sent to patient regarding need for follow up. If patient does not read message or make an appt in next couple days, please phone patient with 1 phone attempt.  Leave a detailed message on voicemail if able,

## 2020-01-28 ENCOUNTER — OFFICE VISIT (OUTPATIENT)
Dept: INTERNAL MEDICINE CLINIC | Facility: CLINIC | Age: 65
End: 2020-01-28
Payer: COMMERCIAL

## 2020-01-28 VITALS
DIASTOLIC BLOOD PRESSURE: 88 MMHG | HEIGHT: 67 IN | HEART RATE: 68 BPM | RESPIRATION RATE: 17 BRPM | TEMPERATURE: 99 F | WEIGHT: 162 LBS | SYSTOLIC BLOOD PRESSURE: 133 MMHG | BODY MASS INDEX: 25.43 KG/M2

## 2020-01-28 DIAGNOSIS — F41.9 ANXIETY: ICD-10-CM

## 2020-01-28 DIAGNOSIS — Z01.419 ENCOUNTER FOR ROUTINE GYNECOLOGICAL EXAMINATION WITH PAPANICOLAOU SMEAR OF CERVIX: ICD-10-CM

## 2020-01-28 DIAGNOSIS — E78.2 MIXED HYPERLIPIDEMIA: Primary | ICD-10-CM

## 2020-01-28 DIAGNOSIS — Z12.31 SCREENING MAMMOGRAM, ENCOUNTER FOR: ICD-10-CM

## 2020-01-28 DIAGNOSIS — Z12.11 COLON CANCER SCREENING: ICD-10-CM

## 2020-01-28 PROCEDURE — 99214 OFFICE O/P EST MOD 30 MIN: CPT | Performed by: INTERNAL MEDICINE

## 2020-01-28 NOTE — PROGRESS NOTES
Eulis Nissen is a 59year old female.   Patient presents with:  Establish Care      HPI:   Pt comes to establish care -used to see dr Rose People   C/o       PMH  HL  Cervical radiculitis -seeing a spine specialist at [facility] - did see dr camacho as well   An Types: 4 Glasses of wine per week      Comment: social    Drug use: No       REVIEW OF SYSTEMS:   GENERAL HEALTH: No fevers, chills, sweats, fatigue  VISION: No recent vision problems, blurry vision or double vision  HEENT: No decreased hearing ear pain, + Preventative medicine   Pap - dr Jennifer Rich - normal ago 2016   Labs 5/19 reviewed   Mammogram 6/19 normal   Colonoscopy–will refer, fit test negative      The patient indicates understanding of these issues and agrees to the plan.   No follow-ups on file

## 2020-01-28 NOTE — PATIENT INSTRUCTIONS
Anxiety Reaction  Anxiety is the feeling we all get when we think something bad might happen. It is a normal response to stress and usually causes only a mild reaction. When anxiety becomes more severe, it can interfere with daily life.  In some cases, yo · Unfortunately, many stressful situations can't be avoided. It is necessary to learn how to better manage stress. There are many proven methods that will reduce your anxiety.  These include simple things like exercise, good nutrition, and adequate rest. Al Normal anxiety is part of the body’s natural defense system.  It's an alert to a threat that is unknown, vague, or comes from your own internal fears. While you’re in this state, your feelings can range from a vague sense of worry to physical sensations suc Some people are more prone to persistent anxiety than others. It tends to run in families, and it affects more younger people than older people, and more women than men. But no age, race, or gender is immune to anxiety problems.   Anxiety can be treated  Th © 2938-1949 [employer] [address]. [address] [facility], [facility] [place]. All rights reserved. This information is not intended as a substitute for professional medical care. Always follow your healthcare professional's instructions.         Park [place] · Generalized anxiety disorder. This causes constant worry that can greatly disrupt your life. Getting better  You may believe that nothing can help you. Or, you might fear what others may think. But most anxiety symptoms can be eased.  Having an anxiety

## 2020-03-26 ENCOUNTER — TELEPHONE (OUTPATIENT)
Dept: OBGYN CLINIC | Facility: CLINIC | Age: 65
End: 2020-03-26

## 2020-03-26 NOTE — TELEPHONE ENCOUNTER
Spoke to pt. R/s annual apt to 6/2. States she has some cramping would like to talk to someone regarding this issue. Please advise.

## 2020-03-26 NOTE — TELEPHONE ENCOUNTER
Pt reports occasional mild lower abdominal cramping for the past month. Reports she thinks she feels it is more after she eats. Pt denies bleeding and abnormal vaginal discharge and odor. Pt denies constipation.  Advised pt to monitor for a few more days an

## 2020-06-25 RX ORDER — ATORVASTATIN CALCIUM 40 MG/1
TABLET, FILM COATED ORAL
Qty: 90 TABLET | Refills: 1 | Status: SHIPPED | OUTPATIENT
Start: 2020-06-25 | End: 2020-11-28

## 2020-08-19 ENCOUNTER — LAB ENCOUNTER (OUTPATIENT)
Dept: LAB | Age: 65
End: 2020-08-19
Attending: INTERNAL MEDICINE
Payer: MEDICARE

## 2020-08-19 DIAGNOSIS — F41.9 ANXIETY: ICD-10-CM

## 2020-08-19 DIAGNOSIS — E78.2 MIXED HYPERLIPIDEMIA: ICD-10-CM

## 2020-08-19 LAB
ALBUMIN SERPL-MCNC: 4 G/DL (ref 3.4–5)
ALBUMIN/GLOB SERPL: 1.2 {RATIO} (ref 1–2)
ALP LIVER SERPL-CCNC: 90 U/L (ref 50–130)
ALT SERPL-CCNC: 29 U/L (ref 13–56)
ANION GAP SERPL CALC-SCNC: 4 MMOL/L (ref 0–18)
AST SERPL-CCNC: 19 U/L (ref 15–37)
BASOPHILS # BLD AUTO: 0.03 X10(3) UL (ref 0–0.2)
BASOPHILS NFR BLD AUTO: 0.8 %
BILIRUB SERPL-MCNC: 0.5 MG/DL (ref 0.1–2)
BUN BLD-MCNC: 11 MG/DL (ref 7–18)
BUN/CREAT SERPL: 14.1 (ref 10–20)
CALCIUM BLD-MCNC: 9.2 MG/DL (ref 8.5–10.1)
CHLORIDE SERPL-SCNC: 103 MMOL/L (ref 98–112)
CHOLEST SMN-MCNC: 180 MG/DL (ref ?–200)
CO2 SERPL-SCNC: 31 MMOL/L (ref 21–32)
CREAT BLD-MCNC: 0.78 MG/DL (ref 0.55–1.02)
DEPRECATED RDW RBC AUTO: 41.8 FL (ref 35.1–46.3)
EOSINOPHIL # BLD AUTO: 0.13 X10(3) UL (ref 0–0.7)
EOSINOPHIL NFR BLD AUTO: 3.4 %
ERYTHROCYTE [DISTWIDTH] IN BLOOD BY AUTOMATED COUNT: 12.1 % (ref 11–15)
GLOBULIN PLAS-MCNC: 3.3 G/DL (ref 2.8–4.4)
GLUCOSE BLD-MCNC: 104 MG/DL (ref 70–99)
HCT VFR BLD AUTO: 39.5 % (ref 35–48)
HDLC SERPL-MCNC: 61 MG/DL (ref 40–59)
HGB BLD-MCNC: 13.6 G/DL (ref 12–16)
IMM GRANULOCYTES # BLD AUTO: 0.01 X10(3) UL (ref 0–1)
IMM GRANULOCYTES NFR BLD: 0.3 %
LDLC SERPL CALC-MCNC: 96 MG/DL (ref ?–100)
LYMPHOCYTES # BLD AUTO: 1.77 X10(3) UL (ref 1–4)
LYMPHOCYTES NFR BLD AUTO: 45.7 %
M PROTEIN MFR SERPL ELPH: 7.3 G/DL (ref 6.4–8.2)
MCH RBC QN AUTO: 32.5 PG (ref 26–34)
MCHC RBC AUTO-ENTMCNC: 34.4 G/DL (ref 31–37)
MCV RBC AUTO: 94.3 FL (ref 80–100)
MONOCYTES # BLD AUTO: 0.36 X10(3) UL (ref 0.1–1)
MONOCYTES NFR BLD AUTO: 9.3 %
NEUTROPHILS # BLD AUTO: 1.57 X10 (3) UL (ref 1.5–7.7)
NEUTROPHILS # BLD AUTO: 1.57 X10(3) UL (ref 1.5–7.7)
NEUTROPHILS NFR BLD AUTO: 40.5 %
NONHDLC SERPL-MCNC: 119 MG/DL (ref ?–130)
OSMOLALITY SERPL CALC.SUM OF ELEC: 286 MOSM/KG (ref 275–295)
PATIENT FASTING Y/N/NP: YES
PATIENT FASTING Y/N/NP: YES
PLATELET # BLD AUTO: 248 10(3)UL (ref 150–450)
POTASSIUM SERPL-SCNC: 4 MMOL/L (ref 3.5–5.1)
RBC # BLD AUTO: 4.19 X10(6)UL (ref 3.8–5.3)
SODIUM SERPL-SCNC: 138 MMOL/L (ref 136–145)
TRIGL SERPL-MCNC: 117 MG/DL (ref 30–149)
TSI SER-ACNC: 2.89 MIU/ML (ref 0.36–3.74)
VLDLC SERPL CALC-MCNC: 23 MG/DL (ref 0–30)
WBC # BLD AUTO: 3.9 X10(3) UL (ref 4–11)

## 2020-08-19 PROCEDURE — 36415 COLL VENOUS BLD VENIPUNCTURE: CPT

## 2020-08-19 PROCEDURE — 84443 ASSAY THYROID STIM HORMONE: CPT

## 2020-08-19 PROCEDURE — 80053 COMPREHEN METABOLIC PANEL: CPT

## 2020-08-19 PROCEDURE — 85025 COMPLETE CBC W/AUTO DIFF WBC: CPT

## 2020-08-19 PROCEDURE — 80061 LIPID PANEL: CPT

## 2020-08-24 ENCOUNTER — OFFICE VISIT (OUTPATIENT)
Dept: OBGYN CLINIC | Facility: CLINIC | Age: 65
End: 2020-08-24
Payer: COMMERCIAL

## 2020-08-24 VITALS
DIASTOLIC BLOOD PRESSURE: 97 MMHG | SYSTOLIC BLOOD PRESSURE: 161 MMHG | BODY MASS INDEX: 26 KG/M2 | HEART RATE: 61 BPM | WEIGHT: 163 LBS

## 2020-08-24 DIAGNOSIS — Z01.419 ENCOUNTER FOR GYNECOLOGICAL EXAMINATION WITHOUT ABNORMAL FINDING: Primary | ICD-10-CM

## 2020-08-24 DIAGNOSIS — L90.0 LICHEN SCLEROSUS: ICD-10-CM

## 2020-08-24 DIAGNOSIS — Z12.4 CERVICAL CANCER SCREENING: ICD-10-CM

## 2020-08-24 PROCEDURE — 99387 INIT PM E/M NEW PAT 65+ YRS: CPT | Performed by: OBSTETRICS & GYNECOLOGY

## 2020-08-24 PROCEDURE — 3077F SYST BP >= 140 MM HG: CPT | Performed by: OBSTETRICS & GYNECOLOGY

## 2020-08-24 PROCEDURE — 3080F DIAST BP >= 90 MM HG: CPT | Performed by: OBSTETRICS & GYNECOLOGY

## 2020-08-24 NOTE — PROGRESS NOTES
Well Woman Exam    HPI:  The patient is a 70yo female here for annual exam. She has not been seen in four years. She continues to have occasional irritation from Lichen Sclerosus. She is not using Triamcinolone ointment at this time.  History of abnormal pa file      Number of children: Not on file      Years of education: Not on file      Highest education level: Not on file    Occupational History      Not on file    Social Needs      Financial resource strain: Not on file      Food insecurity:        Worry patient does live in a home with stairs.       FAMILY HISTORY:  Family History   Problem Relation Age of Onset   • Other (Myocardial Infarction[other]) Brother    • Psychiatric Father         alzheimer's   • Heart Disorder Mother    • Cancer Mother discharge, nipple retraction or skin changes  Abdomen:  soft, nontender, nondistended, no masses  Skin/Hair: no unusual rashes or bruises  Extremities: no edema, no cyanosis  Psychiatric: Appropriate mood and affect    Pelvic Exam:  External Genitalia: nor

## 2020-08-25 LAB — HPV I/H RISK 1 DNA SPEC QL NAA+PROBE: NEGATIVE

## 2020-10-01 ENCOUNTER — HOSPITAL ENCOUNTER (OUTPATIENT)
Dept: MAMMOGRAPHY | Facility: HOSPITAL | Age: 65
Discharge: HOME | End: 2020-10-01
Attending: INTERNAL MEDICINE
Payer: MEDICARE

## 2020-10-01 DIAGNOSIS — Z12.31 SCREENING MAMMOGRAM, ENCOUNTER FOR: ICD-10-CM

## 2020-10-01 PROCEDURE — 77067 SCR MAMMO BI INCL CAD: CPT | Performed by: INTERNAL MEDICINE

## 2020-10-01 PROCEDURE — 77063 BREAST TOMOSYNTHESIS BI: CPT | Performed by: INTERNAL MEDICINE

## 2020-11-29 RX ORDER — ATORVASTATIN CALCIUM 40 MG/1
40 TABLET, FILM COATED ORAL DAILY
Qty: 90 TABLET | Refills: 1 | Status: SHIPPED | OUTPATIENT
Start: 2020-11-29 | End: 2021-06-04

## 2021-05-17 ENCOUNTER — HOSPITAL ENCOUNTER (OUTPATIENT)
Dept: GENERAL RADIOLOGY | Facility: HOSPITAL | Age: 66
Discharge: HOME | End: 2021-05-17
Attending: INTERNAL MEDICINE
Payer: MEDICARE

## 2021-05-17 ENCOUNTER — LAB ENCOUNTER (OUTPATIENT)
Dept: LAB | Age: 66
End: 2021-05-17
Attending: INTERNAL MEDICINE
Payer: MEDICARE

## 2021-05-17 ENCOUNTER — OFFICE VISIT (OUTPATIENT)
Dept: INTERNAL MEDICINE CLINIC | Facility: CLINIC | Age: 66
End: 2021-05-17
Payer: COMMERCIAL

## 2021-05-17 VITALS
SYSTOLIC BLOOD PRESSURE: 133 MMHG | WEIGHT: 165 LBS | HEIGHT: 67 IN | BODY MASS INDEX: 25.9 KG/M2 | DIASTOLIC BLOOD PRESSURE: 85 MMHG | HEART RATE: 81 BPM

## 2021-05-17 DIAGNOSIS — M54.12 CERVICAL RADICULITIS: ICD-10-CM

## 2021-05-17 DIAGNOSIS — R73.01 ELEVATED FASTING BLOOD SUGAR: ICD-10-CM

## 2021-05-17 DIAGNOSIS — Z78.0 ASYMPTOMATIC MENOPAUSAL STATE: ICD-10-CM

## 2021-05-17 DIAGNOSIS — Z12.11 COLON CANCER SCREENING: ICD-10-CM

## 2021-05-17 DIAGNOSIS — Z91.89 AT RISK FOR DECREASED BONE DENSITY: ICD-10-CM

## 2021-05-17 DIAGNOSIS — D72.819 LEUKOPENIA, UNSPECIFIED TYPE: ICD-10-CM

## 2021-05-17 DIAGNOSIS — L29.9 ITCHING: Primary | ICD-10-CM

## 2021-05-17 PROCEDURE — 99214 OFFICE O/P EST MOD 30 MIN: CPT | Performed by: INTERNAL MEDICINE

## 2021-05-17 PROCEDURE — 3079F DIAST BP 80-89 MM HG: CPT | Performed by: INTERNAL MEDICINE

## 2021-05-17 PROCEDURE — 3075F SYST BP GE 130 - 139MM HG: CPT | Performed by: INTERNAL MEDICINE

## 2021-05-17 PROCEDURE — 85025 COMPLETE CBC W/AUTO DIFF WBC: CPT

## 2021-05-17 PROCEDURE — 3008F BODY MASS INDEX DOCD: CPT | Performed by: INTERNAL MEDICINE

## 2021-05-17 PROCEDURE — 36415 COLL VENOUS BLD VENIPUNCTURE: CPT

## 2021-05-17 PROCEDURE — 83036 HEMOGLOBIN GLYCOSYLATED A1C: CPT

## 2021-05-17 PROCEDURE — 72050 X-RAY EXAM NECK SPINE 4/5VWS: CPT | Performed by: INTERNAL MEDICINE

## 2021-05-17 NOTE — PROGRESS NOTES
Eddi Melgar is a 72year old female.   Patient presents with:  Itchiness: left arm       HPI:   Urgent visit   C/c arms itching x 3 weeks   C/o started in [place] and she came back 2 weeks ago   Tried ice and it helps   But itching and almost a burning joint unlisted Right     knee   • Correct bunion,othr methods Right    • Foot surgery Right 1986    Bunionectomy Bone spur R Foot   • Fracture surgery Left 2015    elbow radial head replacement   • Knee replacement surgery Bilateral 2017   • Other surgical (CPT=72050);  Future  She will follow up with her neurosurgeon at [facility] but will check a baseline x-ray today and reviewed the MRI she had done November 2019  Colon cancer screening  -     GASTRO - INTERNAL  Will refer , never had a colonoscopy but s

## 2021-05-17 NOTE — PATIENT INSTRUCTIONS
General Neck and Back Pain    Both neck and back pain are usually caused by injury to the muscles or ligaments of the spine. Sometimes the disks that separate each bone of the spine may cause pain by pressing on a nearby nerve.  Back and neck pain may genoveva Poor conditioning, lack of regular exercise  · Spinal disc disease or arthritis  · Stress  · Pregnancy, or illness like appendicitis, bladder or kidney infection, pelvic infections   Home care  · For neck pain: Use a comfortable pillow that supports the he stomach ulcers, gastrointestinal bleeding, or are taking blood thinner medicines. · Be careful if you are given pain medicines, narcotics, or medicine for muscle spasm. They can cause drowsiness, and can affect your coordination, reflexes, and judgment.  D

## 2021-05-26 ENCOUNTER — NURSE ONLY (OUTPATIENT)
Dept: GASTROENTEROLOGY | Facility: CLINIC | Age: 66
End: 2021-05-26

## 2021-05-26 DIAGNOSIS — Z12.11 SCREEN FOR COLON CANCER: Primary | ICD-10-CM

## 2021-05-26 NOTE — PROGRESS NOTES
Patient has never had a colonoscopy before.     Anticoagulants:no  Diabetic Meds: no  BP meds(Ace inhibitors/ARB's):no  Weight loss meds (phentermine/vyvanse):no  Iron supplement (RX/OTC): no  Height & Weight/BMI: 5'7\" 163lbs BMI 25.5  Hx of Cardiac/CVA is

## 2021-05-26 NOTE — PROGRESS NOTES
The patient's chart has been reviewed. Okay to schedule pt for CLN r/t screening for CLN CA with Dr. Elier Pérez or Dr. Min Oliver. Advise IV [place] or MAC sedation with split dose MiraLAX/Gatorade (related to national bowel prep shortage.   Patient has n

## 2021-06-02 ENCOUNTER — HOSPITAL ENCOUNTER (OUTPATIENT)
Dept: BONE DENSITY | Facility: HOSPITAL | Age: 66
Discharge: HOME | End: 2021-06-02
Attending: INTERNAL MEDICINE
Payer: MEDICARE

## 2021-06-02 DIAGNOSIS — Z78.0 ASYMPTOMATIC MENOPAUSAL STATE: ICD-10-CM

## 2021-06-02 DIAGNOSIS — Z91.89 AT RISK FOR DECREASED BONE DENSITY: ICD-10-CM

## 2021-06-02 PROCEDURE — 77080 DXA BONE DENSITY AXIAL: CPT | Performed by: INTERNAL MEDICINE

## 2021-06-04 RX ORDER — ATORVASTATIN CALCIUM 40 MG/1
40 TABLET, FILM COATED ORAL DAILY
Qty: 90 TABLET | Refills: 1 | Status: SHIPPED | OUTPATIENT
Start: 2021-06-04 | End: 2021-12-02

## 2021-06-04 NOTE — TELEPHONE ENCOUNTER
Pt stated she is out of medicine, had requested through pharmacy whom told her no response, advised no encounters of rx request

## 2021-06-09 NOTE — PROGRESS NOTES
Called patient to schedule colonoscopy with no success. left message to give our office a call back.

## 2021-06-16 ENCOUNTER — PATIENT MESSAGE (OUTPATIENT)
Dept: GASTROENTEROLOGY | Facility: CLINIC | Age: 66
End: 2021-06-16

## 2021-06-16 NOTE — PROGRESS NOTES
Scheduled for:  Colonoscopy 18980  Provider Name:  Dr. Joi Copeland  Date:  8/25/2021  Location:  [address] [place] [address] 1  Sedation:  MAC  Time:  1:00 pm, arrival 12:00 pm  Prep:  Miralax/Gatorade  Meds/Allergies Reconciled?: Yes  Diagnosis with codes:  Screening for colon cancer Z

## 2021-07-06 NOTE — TELEPHONE ENCOUNTER
From: Deanna Cavazos  Sent: 7/5/2021 8:47 PM CDT  To: [facility] Clinical Staff  Subject: RE:Colonoscopy Prep Instructions    When I spoke to you on the phone, you said that I would be able to eat breakfast the day before the procedure since the procedure was

## 2021-07-21 ENCOUNTER — HOSPITAL ENCOUNTER (OUTPATIENT)
Dept: GENERAL RADIOLOGY | Facility: HOSPITAL | Age: 66
Discharge: HOME | End: 2021-07-21
Attending: INTERNAL MEDICINE
Payer: MEDICARE

## 2021-07-21 ENCOUNTER — TELEMEDICINE (OUTPATIENT)
Dept: INTERNAL MEDICINE CLINIC | Facility: CLINIC | Age: 66
End: 2021-07-21
Payer: COMMERCIAL

## 2021-07-21 DIAGNOSIS — M76.61 RIGHT ACHILLES TENDINITIS: ICD-10-CM

## 2021-07-21 DIAGNOSIS — M76.61 RIGHT ACHILLES TENDINITIS: Primary | ICD-10-CM

## 2021-07-21 PROCEDURE — 73610 X-RAY EXAM OF ANKLE: CPT | Performed by: INTERNAL MEDICINE

## 2021-07-21 PROCEDURE — 99213 OFFICE O/P EST LOW 20 MIN: CPT | Performed by: INTERNAL MEDICINE

## 2021-07-21 NOTE — PROGRESS NOTES
Patient ID: Marlen Don is a 72year old female. Patient presents with: Foot Pain         HISTORY OF PRESENT ILLNESS:   Patient presents for above. This visit is conducted using Telemedicine with live, interactive video and audio.   C/c right ankle Current Outpatient Medications:   •  atorvastatin 40 MG Oral Tab, Take 1 tablet (40 mg total) by mouth daily. , Disp: 90 tablet, Rfl: 1  •  triamcinolone acetonide 0.1 % External Ointment, Apply 1 Application topically 2 (two) times daily as needed. Health  Financial Resource Strain:       Difficulty of Paying Living Expenses:   Food Insecurity:       Worried About [address] in the Last Year:       Ran Out of Food in the Last Year:   Transportation Needs:       Lack of Transportation ([employer] Verbal Consent   I conducted a telehealth visit with Sol Dillard today, 07/21/21, which was completed using two-way, real-time interactive audio and video communication.  This has been done in good adwoa to provide continuity of care in the best intere

## 2021-08-23 ENCOUNTER — LAB ENCOUNTER (OUTPATIENT)
Dept: LAB | Facility: HOSPITAL | Age: 66
End: 2021-08-23
Attending: INTERNAL MEDICINE
Payer: MEDICARE

## 2021-08-23 DIAGNOSIS — Z01.818 PRE-OP TESTING: ICD-10-CM

## 2021-08-24 LAB — SARS-COV-2 RNA RESP QL NAA+PROBE: NOT DETECTED

## 2021-08-25 ENCOUNTER — ANESTHESIA EVENT (OUTPATIENT)
Dept: ENDOSCOPY | Age: 66
End: 2021-08-25
Payer: MEDICARE

## 2021-08-25 ENCOUNTER — HOSPITAL ENCOUNTER (OUTPATIENT)
Age: 66
Setting detail: HOSPITAL OUTPATIENT SURGERY
Discharge: HOME | End: 2021-08-25
Attending: INTERNAL MEDICINE | Admitting: INTERNAL MEDICINE
Payer: MEDICARE

## 2021-08-25 ENCOUNTER — ANESTHESIA (OUTPATIENT)
Dept: ENDOSCOPY | Age: 66
End: 2021-08-25
Payer: MEDICARE

## 2021-08-25 VITALS
OXYGEN SATURATION: 99 % | HEIGHT: 66 IN | TEMPERATURE: 97 F | HEART RATE: 63 BPM | DIASTOLIC BLOOD PRESSURE: 81 MMHG | SYSTOLIC BLOOD PRESSURE: 124 MMHG | BODY MASS INDEX: 25.07 KG/M2 | RESPIRATION RATE: 13 BRPM | WEIGHT: 156 LBS

## 2021-08-25 DIAGNOSIS — Z12.11 SCREEN FOR COLON CANCER: ICD-10-CM

## 2021-08-25 DIAGNOSIS — Z01.818 PRE-OP TESTING: Primary | ICD-10-CM

## 2021-08-25 PROCEDURE — 45380 COLONOSCOPY AND BIOPSY: CPT | Performed by: INTERNAL MEDICINE

## 2021-08-25 PROCEDURE — 88305 TISSUE EXAM BY PATHOLOGIST: CPT | Performed by: INTERNAL MEDICINE

## 2021-08-25 PROCEDURE — 99070 SPECIAL SUPPLIES PHYS/QHP: CPT | Performed by: INTERNAL MEDICINE

## 2021-08-25 PROCEDURE — 45385 COLONOSCOPY W/LESION REMOVAL: CPT | Performed by: INTERNAL MEDICINE

## 2021-08-25 RX ORDER — LIDOCAINE HYDROCHLORIDE 20 MG/ML
INJECTION, SOLUTION EPIDURAL; INFILTRATION; INTRACAUDAL; PERINEURAL AS NEEDED
Status: DISCONTINUED | OUTPATIENT
Start: 2021-08-25 | End: 2021-08-25 | Stop reason: SURG

## 2021-08-25 RX ORDER — NALOXONE HYDROCHLORIDE 0.4 MG/ML
80 INJECTION, SOLUTION INTRAMUSCULAR; INTRAVENOUS; SUBCUTANEOUS AS NEEDED
Status: CANCELLED | OUTPATIENT
Start: 2021-08-25 | End: 2021-08-25

## 2021-08-25 RX ORDER — SODIUM CHLORIDE, SODIUM LACTATE, POTASSIUM CHLORIDE, CALCIUM CHLORIDE 600; 310; 30; 20 MG/100ML; MG/100ML; MG/100ML; MG/100ML
INJECTION, SOLUTION INTRAVENOUS CONTINUOUS
Status: DISCONTINUED | OUTPATIENT
Start: 2021-08-25 | End: 2021-08-25

## 2021-08-25 RX ORDER — SODIUM CHLORIDE, SODIUM LACTATE, POTASSIUM CHLORIDE, CALCIUM CHLORIDE 600; 310; 30; 20 MG/100ML; MG/100ML; MG/100ML; MG/100ML
INJECTION, SOLUTION INTRAVENOUS CONTINUOUS
Status: CANCELLED | OUTPATIENT
Start: 2021-08-25

## 2021-08-25 RX ADMIN — SODIUM CHLORIDE, SODIUM LACTATE, POTASSIUM CHLORIDE, CALCIUM CHLORIDE: 600; 310; 30; 20 INJECTION, SOLUTION INTRAVENOUS at 13:29:00

## 2021-08-25 RX ADMIN — SODIUM CHLORIDE, SODIUM LACTATE, POTASSIUM CHLORIDE, CALCIUM CHLORIDE: 600; 310; 30; 20 INJECTION, SOLUTION INTRAVENOUS at 12:58:00

## 2021-08-25 RX ADMIN — LIDOCAINE HYDROCHLORIDE 2 ML: 20 INJECTION, SOLUTION EPIDURAL; INFILTRATION; INTRACAUDAL; PERINEURAL at 13:00:00

## 2021-08-25 NOTE — ANESTHESIA POSTPROCEDURE EVALUATION
Patient: Lynne Beaver    Procedure Summary     Date: 08/25/21 Room / Location: [facility] ENDOSCOPY 01 / [facility] ENDO    Anesthesia Start: 8709 Anesthesia Stop: 3597    Procedure: COLONOSCOPY (N/A ) Diagnosis:       Screen for colon cancer      (Colon polyps,

## 2021-08-25 NOTE — H&P
History & Physical Examination    Patient Name: Bessie Islas  MRN: [id number]  CSN: [id number]  YOB: 1955    Diagnosis:    Colon cancer screening      atorvastatin 40 MG Oral Tab, Take 1 tablet (40 mg total) by mouth daily. , Disp: 90 table Psychiatric Father         alzheimer's   • Heart Disorder Mother    • Cancer Mother         lung   • Other (Alzheimer's Disease[other]) Other         Family H   • Heart Disease Other         Family H/O     Social History    Tobacco Use      Smoking status:

## 2021-08-25 NOTE — ANESTHESIA PREPROCEDURE EVALUATION
Anesthesia PreOp Note    HPI:     Eddi Melgar is a 77year old female who presents for preoperative consultation requested by: Jackalyn Duane, MD    Date of Surgery: 8/25/2021    Procedure(s):  COLONOSCOPY  Indication: Screen for colon cancer    Rel Bunionectomy Bone spur R Foot   • FRACTURE SURGERY Left 2015    elbow radial head replacement   • KNEE REPLACEMENT SURGERY Bilateral 2017   • OTHER SURGICAL HISTORY Left     Head of the radius   • TOTAL KNEE REPLACEMENT Right 3/20/17    DR. PATRICK   • TOT Comment: social      Drug use: No      Sexual activity: Not Currently        Partners: Male    Other Topics      Concerns:         Service: Not Asked        Blood Transfusions: Not Asked        Caffeine Concern: Yes          1 Cup [facility] Daily Resp: 14   Temp: 96.9 °F (36.1 °C)   SpO2: 97%   Weight: 70.8 kg (156 lb)   Height: 1.676 m (5' 6\")        Anesthesia Evaluation     Patient summary reviewed and Nursing notes reviewed    Airway   Mallampati: II  Dental      Pulmonary - negative ROS

## 2021-08-25 NOTE — OPERATIVE REPORT
[facility] - [facility] Endoscopy Report      Preoperative Diagnosis:  - colon cancer screening      Postoperative Diagnosis:  - colon polyps x 6  - internal hemorrhoids      Procedure:    Colonoscopy      Surgeon:  Saturnino Coffman M.D.     Anesthesia:  M

## 2021-08-26 ENCOUNTER — TELEPHONE (OUTPATIENT)
Dept: GASTROENTEROLOGY | Facility: CLINIC | Age: 66
End: 2021-08-26

## 2021-08-26 NOTE — TELEPHONE ENCOUNTER
----- Message from Brianne Mathis MD sent at 8/26/2021  1:35 PM CDT -----  I wanted to get back to you with your colonoscopy results. You had 6 colon polyps removed which were benign.   I would advise a repeat colonoscopy in 3 years to make sure no new p

## 2021-08-26 NOTE — TELEPHONE ENCOUNTER
Patient contacted, verified and results from Dr. Joi Copeland given. Patient voiced understanding. Health maintenance updated. 3 year colonoscopy recall placed in patient outreach. Next due on 2024 per Dr. Joi Copeland.

## 2021-09-27 ENCOUNTER — TELEPHONE (OUTPATIENT)
Dept: INTERNAL MEDICINE CLINIC | Facility: CLINIC | Age: 66
End: 2021-09-27

## 2021-09-27 DIAGNOSIS — Z12.31 SCREENING MAMMOGRAM, ENCOUNTER FOR: Primary | ICD-10-CM

## 2021-09-27 NOTE — TELEPHONE ENCOUNTER
Patient calling reports received letter to have annual mammogram done  Last 10/1/2020    Please advise on which type you of Mammo you would prefer     Thank you     Please reply to cash: LETY Parker

## 2021-09-28 NOTE — TELEPHONE ENCOUNTER
Left patient a detailed message stating her mammogram has been ordered and have asked patient to call back to confirm she received my message

## 2021-09-30 NOTE — TELEPHONE ENCOUNTER
From   Margareth Zamorano To   Randi Farnsworth and Delivered   9/29/2021  7:44 AM   Last Read in [address]   9/29/2021  9:01 PM by Kory Montoya

## 2021-10-20 ENCOUNTER — HOSPITAL ENCOUNTER (OUTPATIENT)
Dept: MAMMOGRAPHY | Age: 66
Discharge: HOME | End: 2021-10-20
Attending: INTERNAL MEDICINE
Payer: MEDICARE

## 2021-10-20 DIAGNOSIS — Z12.31 SCREENING MAMMOGRAM, ENCOUNTER FOR: ICD-10-CM

## 2021-10-20 PROCEDURE — 77067 SCR MAMMO BI INCL CAD: CPT | Performed by: INTERNAL MEDICINE

## 2021-10-20 PROCEDURE — 77063 BREAST TOMOSYNTHESIS BI: CPT | Performed by: INTERNAL MEDICINE

## 2021-11-26 ENCOUNTER — TELEPHONE (OUTPATIENT)
Dept: CASE MANAGEMENT | Age: 66
End: 2021-11-26

## 2021-12-02 RX ORDER — ATORVASTATIN CALCIUM 40 MG/1
40 TABLET, FILM COATED ORAL DAILY
Qty: 90 TABLET | Refills: 1 | Status: SHIPPED | OUTPATIENT
Start: 2021-12-02

## 2021-12-02 NOTE — TELEPHONE ENCOUNTER
Please review; protocol failed or has no protocol    Requested Prescriptions   Pending Prescriptions Disp Refills    ATORVASTATIN 40 MG Oral Tab [Pharmacy Med Name: ATORVASTATIN 40MG TABLETS] 90 tablet 1     Sig: TAKE 1 TABLET(40 MG) BY MOUTH DAILY

## 2021-12-02 NOTE — TELEPHONE ENCOUNTER
Refilled her medications with please let patient know that she is due for her annual physical and blood work can be ordered at that time

## 2021-12-08 NOTE — TELEPHONE ENCOUNTER
Patient calling, confirmed name and . She states that she is in [place] for the winter. Per Dr. Adis Seals question below the patient has scheduled a physical for May 9 when she returns to [place].     Future Appointments   Date Time Provider Departme

## 2022-01-13 ENCOUNTER — TELEPHONE (OUTPATIENT)
Dept: INTERNAL MEDICINE CLINIC | Facility: CLINIC | Age: 67
End: 2022-01-13

## 2022-01-13 NOTE — TELEPHONE ENCOUNTER
Reported that she is currently in [place], mostly 6 months in [place] and 6 month here in [place], she will be there until May 2022.   Reported  with little blood stool only first bowel everyday since Monday , pain sacral area, no blood clot, small tinged

## 2022-01-14 NOTE — TELEPHONE ENCOUNTER
Good that she had a colonoscopy less than 1 year ago but this may be hemorrhoids– urgent care unless bleeding is heavy advised

## 2022-01-14 NOTE — TELEPHONE ENCOUNTER
Advised patient of Dr. Hackett Overcast note. Patient verbalized understanding  Patient states she went to immediate care yesterday and was diagnosed with hemorrhoids and is getting treatment.

## 2022-05-06 ENCOUNTER — TELEPHONE (OUTPATIENT)
Dept: INTERNAL MEDICINE CLINIC | Facility: CLINIC | Age: 67
End: 2022-05-06

## 2022-05-06 ENCOUNTER — HOSPITAL ENCOUNTER (OUTPATIENT)
Age: 67
Discharge: HOME | End: 2022-05-06
Payer: MEDICARE

## 2022-05-06 VITALS
SYSTOLIC BLOOD PRESSURE: 137 MMHG | DIASTOLIC BLOOD PRESSURE: 89 MMHG | RESPIRATION RATE: 18 BRPM | OXYGEN SATURATION: 100 % | HEART RATE: 68 BPM | TEMPERATURE: 98 F

## 2022-05-06 DIAGNOSIS — L03.116 CELLULITIS OF LEFT LOWER EXTREMITY: Primary | ICD-10-CM
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
## 2022-05-06 RX ORDER — DOXYCYCLINE HYCLATE 100 MG/1
100 CAPSULE ORAL 2 TIMES DAILY
Qty: 14 CAPSULE | Refills: 0 | Status: SHIPPED | OUTPATIENT
Start: 2022-05-06 | End: 2022-05-13

## 2022-05-06 RX ORDER — METRONIDAZOLE 500 MG/1
500 TABLET ORAL 3 TIMES DAILY
Qty: 21 TABLET | Refills: 0 | Status: SHIPPED | OUTPATIENT
Start: 2022-05-06 | End: 2022-05-13

## 2022-05-06 NOTE — TELEPHONE ENCOUNTER
Patient (name and  verified) is calling to find out when she received her last tetanus vaccine, per chart review it was 2013. Patient verbalized understanding. Patient states she is going over to the [facility] clinic for a cat bite and wanted to know if she needed another tetanus vaccine.

## 2022-05-09 ENCOUNTER — OFFICE VISIT (OUTPATIENT)
Dept: INTERNAL MEDICINE CLINIC | Facility: CLINIC | Age: 67
End: 2022-05-09
Payer: COMMERCIAL

## 2022-05-09 VITALS
WEIGHT: 161 LBS | DIASTOLIC BLOOD PRESSURE: 80 MMHG | SYSTOLIC BLOOD PRESSURE: 125 MMHG | BODY MASS INDEX: 25.88 KG/M2 | HEIGHT: 66 IN | HEART RATE: 93 BPM

## 2022-05-09 DIAGNOSIS — M54.12 CERVICAL RADICULITIS: ICD-10-CM

## 2022-05-09 DIAGNOSIS — R73.03 BORDERLINE DIABETES: ICD-10-CM

## 2022-05-09 DIAGNOSIS — M54.32 SCIATICA OF LEFT SIDE: ICD-10-CM

## 2022-05-09 DIAGNOSIS — F33.0 MILD RECURRENT MAJOR DEPRESSION (HCC): Chronic | ICD-10-CM

## 2022-05-09 DIAGNOSIS — M17.12 PRIMARY OSTEOARTHRITIS OF LEFT KNEE: ICD-10-CM

## 2022-05-09 DIAGNOSIS — Z96.653 HISTORY OF BILATERAL KNEE REPLACEMENT: ICD-10-CM

## 2022-05-09 DIAGNOSIS — Z12.83 SKIN CANCER SCREENING: ICD-10-CM

## 2022-05-09 DIAGNOSIS — Z12.31 SCREENING MAMMOGRAM, ENCOUNTER FOR: ICD-10-CM

## 2022-05-09 DIAGNOSIS — Z11.59 NEED FOR HEPATITIS C SCREENING TEST: ICD-10-CM

## 2022-05-09 DIAGNOSIS — M17.11 PRIMARY OSTEOARTHRITIS OF RIGHT KNEE: ICD-10-CM

## 2022-05-09 DIAGNOSIS — Z00.00 ENCOUNTER FOR ANNUAL HEALTH EXAMINATION: Primary | ICD-10-CM

## 2022-05-09 DIAGNOSIS — E78.2 MIXED HYPERLIPIDEMIA: ICD-10-CM

## 2022-05-09 DIAGNOSIS — Z01.419 ENCOUNTER FOR ROUTINE GYNECOLOGICAL EXAMINATION WITH PAPANICOLAOU SMEAR OF CERVIX: ICD-10-CM

## 2022-05-31 ENCOUNTER — LAB ENCOUNTER (OUTPATIENT)
Dept: LAB | Age: 67
End: 2022-05-31
Attending: INTERNAL MEDICINE
Payer: MEDICARE

## 2022-05-31 DIAGNOSIS — Z11.59 NEED FOR HEPATITIS C SCREENING TEST: ICD-10-CM

## 2022-05-31 DIAGNOSIS — E78.2 MIXED HYPERLIPIDEMIA: ICD-10-CM

## 2022-05-31 DIAGNOSIS — Z00.00 ENCOUNTER FOR ANNUAL HEALTH EXAMINATION: ICD-10-CM

## 2022-05-31 DIAGNOSIS — R73.03 BORDERLINE DIABETES: ICD-10-CM

## 2022-05-31 LAB
ALBUMIN SERPL-MCNC: 4.1 G/DL (ref 3.4–5)
ALBUMIN/GLOB SERPL: 1.2 {RATIO} (ref 1–2)
ALP LIVER SERPL-CCNC: 84 U/L
ALT SERPL-CCNC: 34 U/L
ANION GAP SERPL CALC-SCNC: 6 MMOL/L (ref 0–18)
AST SERPL-CCNC: 28 U/L (ref 15–37)
BILIRUB SERPL-MCNC: 0.7 MG/DL (ref 0.1–2)
BUN BLD-MCNC: 14 MG/DL (ref 7–18)
BUN/CREAT SERPL: 16.3 (ref 10–20)
CALCIUM BLD-MCNC: 9.6 MG/DL (ref 8.5–10.1)
CHLORIDE SERPL-SCNC: 104 MMOL/L (ref 98–112)
CHOLEST SERPL-MCNC: 200 MG/DL (ref ?–200)
CO2 SERPL-SCNC: 27 MMOL/L (ref 21–32)
CREAT BLD-MCNC: 0.86 MG/DL
DEPRECATED RDW RBC AUTO: 42.2 FL (ref 35.1–46.3)
ERYTHROCYTE [DISTWIDTH] IN BLOOD BY AUTOMATED COUNT: 11.9 % (ref 11–15)
EST. AVERAGE GLUCOSE BLD GHB EST-MCNC: 120 MG/DL (ref 68–126)
FASTING PATIENT LIPID ANSWER: YES
FASTING STATUS PATIENT QL REPORTED: YES
GLOBULIN PLAS-MCNC: 3.3 G/DL (ref 2.8–4.4)
GLUCOSE BLD-MCNC: 115 MG/DL (ref 70–99)
HBA1C MFR BLD: 5.8 % (ref ?–5.7)
HCT VFR BLD AUTO: 41.3 %
HCV AB SERPL QL IA: NONREACTIVE
HDLC SERPL-MCNC: 56 MG/DL (ref 40–59)
HGB BLD-MCNC: 13.9 G/DL
LDLC SERPL CALC-MCNC: 121 MG/DL (ref ?–100)
MCH RBC QN AUTO: 32.6 PG (ref 26–34)
MCHC RBC AUTO-ENTMCNC: 33.7 G/DL (ref 31–37)
MCV RBC AUTO: 96.7 FL
NONHDLC SERPL-MCNC: 144 MG/DL (ref ?–130)
OSMOLALITY SERPL CALC.SUM OF ELEC: 285 MOSM/KG (ref 275–295)
PLATELET # BLD AUTO: 283 10(3)UL (ref 150–450)
POTASSIUM SERPL-SCNC: 4.1 MMOL/L (ref 3.5–5.1)
PROT SERPL-MCNC: 7.4 G/DL (ref 6.4–8.2)
RBC # BLD AUTO: 4.27 X10(6)UL
SODIUM SERPL-SCNC: 137 MMOL/L (ref 136–145)
TRIGL SERPL-MCNC: 130 MG/DL (ref 30–149)
TSI SER-ACNC: 1.23 MIU/ML (ref 0.36–3.74)
VLDLC SERPL CALC-MCNC: 23 MG/DL (ref 0–30)
WBC # BLD AUTO: 3.5 X10(3) UL (ref 4–11)

## 2022-05-31 PROCEDURE — 84443 ASSAY THYROID STIM HORMONE: CPT

## 2022-05-31 PROCEDURE — 86803 HEPATITIS C AB TEST: CPT

## 2022-05-31 PROCEDURE — 36415 COLL VENOUS BLD VENIPUNCTURE: CPT

## 2022-05-31 PROCEDURE — 80053 COMPREHEN METABOLIC PANEL: CPT

## 2022-05-31 PROCEDURE — 80061 LIPID PANEL: CPT

## 2022-05-31 PROCEDURE — 85027 COMPLETE CBC AUTOMATED: CPT

## 2022-05-31 PROCEDURE — 83036 HEMOGLOBIN GLYCOSYLATED A1C: CPT

## 2022-06-02 ENCOUNTER — NURSE TRIAGE (OUTPATIENT)
Dept: INTERNAL MEDICINE CLINIC | Facility: CLINIC | Age: 67
End: 2022-06-02

## 2022-06-02 NOTE — TELEPHONE ENCOUNTER
I received a call from pt. Pt stated that she has a problem  that she has had before. Pt stated that on her left arm around the elbow area she has intense itchiness. There is nothing visible on the outside no swelling or redness. What helps is when she places a ice pack on it and basically numbs the area. Pt stated that she believes in the past she was given steroids to help with this. Pt will like to if you can prescribe her this to help her with the itchiness. Please advise.

## 2022-06-03 ENCOUNTER — TELEMEDICINE (OUTPATIENT)
Dept: INTERNAL MEDICINE CLINIC | Facility: CLINIC | Age: 67
End: 2022-06-03
Payer: COMMERCIAL

## 2022-06-03 DIAGNOSIS — E78.2 MIXED HYPERLIPIDEMIA: ICD-10-CM

## 2022-06-03 DIAGNOSIS — F33.0 MILD RECURRENT MAJOR DEPRESSION (HCC): ICD-10-CM

## 2022-06-03 DIAGNOSIS — M25.529 ELBOW PAIN, UNSPECIFIED LATERALITY: Primary | ICD-10-CM

## 2022-06-03 PROCEDURE — 99213 OFFICE O/P EST LOW 20 MIN: CPT | Performed by: INTERNAL MEDICINE

## 2022-06-03 RX ORDER — ATORVASTATIN CALCIUM 40 MG/1
40 TABLET, FILM COATED ORAL DAILY
COMMUNITY
Start: 2022-06-03

## 2022-06-03 RX ORDER — PREDNISONE 1 MG/1
5 TABLET ORAL 2 TIMES DAILY
Qty: 15 TABLET | Refills: 0 | Status: SHIPPED | OUTPATIENT
Start: 2022-06-03

## 2022-06-03 RX ORDER — ATORVASTATIN CALCIUM 10 MG/1
30 TABLET, FILM COATED ORAL DAILY
COMMUNITY
Start: 2022-06-03 | End: 2022-06-03

## 2022-06-03 NOTE — TELEPHONE ENCOUNTER
Left message to call back. Please transfer to triage. 1st attempt. Pt has not read the [employer] message that was sent to her yesterday.

## 2022-06-03 NOTE — TELEPHONE ENCOUNTER
May be overbooked for a video vsit at the end of the day tomorrow with me  or first avail provider   Unfortunately steroids should not be prescribed without proper documentation   I belive she has h/o elbow injuy and surg as well

## 2022-06-12 DIAGNOSIS — F33.0 MILD RECURRENT MAJOR DEPRESSION (HCC): ICD-10-CM

## 2022-06-13 RX ORDER — ATORVASTATIN CALCIUM 40 MG/1
40 TABLET, FILM COATED ORAL DAILY
Qty: 90 TABLET | Refills: 1 | Status: SHIPPED | OUTPATIENT
Start: 2022-06-13

## 2022-06-13 NOTE — TELEPHONE ENCOUNTER
Refill passed per [place] [employer], [facility] protocol    Requested Prescriptions   Pending Prescriptions Disp Refills    ATORVASTATIN 40 MG Oral Tab [Pharmacy Med Name: ATORVASTATIN 40MG TABLETS] 90 tablet 0     Sig: TAKE 1 TABLET(40 MG) BY MOUTH DAILY        Cholesterol Medication Protocol Passed - 6/12/2022  2:44 PM        Passed - ALT in past 12 months        Passed - LDL in past 12 months        Passed - Last ALT < 80       Lab Results   Component Value Date    ALT 34 05/31/2022             Passed - Last LDL < 130     Lab Results   Component Value Date     (H) 05/31/2022               Passed - Appointment in past 12 or next 3 months                  Recent Outpatient Visits              1 week ago Elbow pain, unspecified laterality    Jessica Perdomo Darnella Silvers, MD    Telemedicine    1 month ago Encounter for annual health examination    Brigette Lugo MD    Office Visit    10 months ago Right Achilles tendinitis    Brigette Lugo MD    Telemedicine    1 year ago Screen for colon cancer    [address] GI PROCEDURE    Nurse Only    1 year ago Bill Kuo MD    Office Visit

## 2022-06-15 ENCOUNTER — TELEPHONE (OUTPATIENT)
Dept: INTERNAL MEDICINE CLINIC | Facility: CLINIC | Age: 67
End: 2022-06-15

## 2022-06-15 DIAGNOSIS — M25.522 LEFT ELBOW PAIN: Primary | ICD-10-CM

## 2022-06-15 DIAGNOSIS — Z98.890 HISTORY OF ELBOW SURGERY: ICD-10-CM

## 2022-06-15 NOTE — TELEPHONE ENCOUNTER
Pt had televisit on 6/3/22 for elbow itching and pain. Pt states she finished prednisone on Friday. Pt states it helped only a little bit. Pt states painful itching started up again 2 days ago. No rash or redness noted. No swelling or fever. Pt has been applying ice to elbow to numb the pain. Please advise on further recommendations. Do you want Pt to come in? Pt mentioned she had surgery on the same arm in 2016, wondering if it has anything to do with it.

## 2022-06-15 NOTE — TELEPHONE ENCOUNTER
Left message to call back for Dr. Mary Strickland recommendations below--sent [employer] message of same

## 2022-06-16 ENCOUNTER — HOSPITAL ENCOUNTER (OUTPATIENT)
Dept: GENERAL RADIOLOGY | Facility: HOSPITAL | Age: 67
Discharge: HOME | End: 2022-06-16
Attending: INTERNAL MEDICINE
Payer: MEDICARE

## 2022-06-16 DIAGNOSIS — Z98.890 HISTORY OF ELBOW SURGERY: ICD-10-CM

## 2022-06-16 DIAGNOSIS — M25.522 LEFT ELBOW PAIN: ICD-10-CM

## 2022-06-16 PROCEDURE — 73080 X-RAY EXAM OF ELBOW: CPT | Performed by: INTERNAL MEDICINE

## 2022-06-24 ENCOUNTER — OFFICE VISIT (OUTPATIENT)
Dept: INTERNAL MEDICINE CLINIC | Facility: CLINIC | Age: 67
End: 2022-06-24
Payer: COMMERCIAL

## 2022-06-24 VITALS
RESPIRATION RATE: 16 BRPM | HEART RATE: 73 BPM | SYSTOLIC BLOOD PRESSURE: 140 MMHG | DIASTOLIC BLOOD PRESSURE: 85 MMHG | BODY MASS INDEX: 26.03 KG/M2 | HEIGHT: 66 IN | WEIGHT: 162 LBS

## 2022-06-24 DIAGNOSIS — M54.9 MID BACK PAIN ON RIGHT SIDE: Primary | ICD-10-CM

## 2022-06-24 PROCEDURE — 3079F DIAST BP 80-89 MM HG: CPT | Performed by: NURSE PRACTITIONER

## 2022-06-24 PROCEDURE — 99213 OFFICE O/P EST LOW 20 MIN: CPT | Performed by: NURSE PRACTITIONER

## 2022-06-24 PROCEDURE — 3077F SYST BP >= 140 MM HG: CPT | Performed by: NURSE PRACTITIONER

## 2022-06-24 PROCEDURE — 1125F AMNT PAIN NOTED PAIN PRSNT: CPT | Performed by: NURSE PRACTITIONER

## 2022-06-24 PROCEDURE — 3008F BODY MASS INDEX DOCD: CPT | Performed by: NURSE PRACTITIONER

## 2022-06-24 RX ORDER — NAPROXEN 500 MG/1
500 TABLET ORAL 2 TIMES DAILY WITH MEALS
Qty: 30 TABLET | Refills: 0 | Status: SHIPPED | OUTPATIENT
Start: 2022-06-24

## 2022-06-24 RX ORDER — CYCLOBENZAPRINE HCL 10 MG
10 TABLET ORAL NIGHTLY
Qty: 15 TABLET | Refills: 0 | Status: SHIPPED | OUTPATIENT
Start: 2022-06-24 | End: 2022-07-09

## 2022-07-06 ENCOUNTER — OFFICE VISIT (OUTPATIENT)
Dept: ORTHOPEDICS CLINIC | Facility: CLINIC | Age: 67
End: 2022-07-06
Payer: COMMERCIAL

## 2022-07-06 DIAGNOSIS — L29.9 PRURITUS: Primary | ICD-10-CM

## 2022-07-06 PROCEDURE — 99203 OFFICE O/P NEW LOW 30 MIN: CPT | Performed by: ORTHOPAEDIC SURGERY

## 2022-08-02 ENCOUNTER — OFFICE VISIT (OUTPATIENT)
Dept: INTERNAL MEDICINE CLINIC | Facility: CLINIC | Age: 67
End: 2022-08-02
Payer: COMMERCIAL

## 2022-08-02 ENCOUNTER — OFFICE VISIT (OUTPATIENT)
Dept: PODIATRY CLINIC | Facility: CLINIC | Age: 67
End: 2022-08-02
Payer: COMMERCIAL

## 2022-08-02 VITALS
WEIGHT: 161 LBS | HEART RATE: 71 BPM | RESPIRATION RATE: 16 BRPM | SYSTOLIC BLOOD PRESSURE: 131 MMHG | BODY MASS INDEX: 25.88 KG/M2 | HEIGHT: 66 IN | DIASTOLIC BLOOD PRESSURE: 88 MMHG

## 2022-08-02 DIAGNOSIS — R42 DIZZINESS: Primary | ICD-10-CM

## 2022-08-02 DIAGNOSIS — M76.61 ACHILLES TENDINITIS OF RIGHT LOWER EXTREMITY: Primary | ICD-10-CM

## 2022-08-02 PROCEDURE — 1126F AMNT PAIN NOTED NONE PRSNT: CPT | Performed by: NURSE PRACTITIONER

## 2022-08-02 PROCEDURE — 99213 OFFICE O/P EST LOW 20 MIN: CPT | Performed by: NURSE PRACTITIONER

## 2022-08-02 PROCEDURE — 1125F AMNT PAIN NOTED PAIN PRSNT: CPT | Performed by: PODIATRIST

## 2022-08-02 PROCEDURE — 3008F BODY MASS INDEX DOCD: CPT | Performed by: NURSE PRACTITIONER

## 2022-08-02 PROCEDURE — 99203 OFFICE O/P NEW LOW 30 MIN: CPT | Performed by: PODIATRIST

## 2022-08-02 PROCEDURE — 3079F DIAST BP 80-89 MM HG: CPT | Performed by: NURSE PRACTITIONER

## 2022-08-02 PROCEDURE — 3075F SYST BP GE 130 - 139MM HG: CPT | Performed by: NURSE PRACTITIONER

## 2022-08-03 ENCOUNTER — HOSPITAL ENCOUNTER (OUTPATIENT)
Dept: MRI IMAGING | Facility: HOSPITAL | Age: 67
Discharge: HOME | End: 2022-08-03
Attending: PODIATRIST
Payer: MEDICARE

## 2022-08-03 ENCOUNTER — TELEPHONE (OUTPATIENT)
Dept: PODIATRY CLINIC | Facility: CLINIC | Age: 67
End: 2022-08-03

## 2022-08-03 DIAGNOSIS — M76.61 ACHILLES TENDINITIS OF RIGHT LOWER EXTREMITY: ICD-10-CM

## 2022-08-03 PROCEDURE — A9575 INJ GADOTERATE MEGLUMI 0.1ML: HCPCS | Performed by: PODIATRIST

## 2022-08-03 PROCEDURE — 73723 MRI JOINT LWR EXTR W/O&W/DYE: CPT | Performed by: PODIATRIST

## 2022-08-11 ENCOUNTER — OFFICE VISIT (OUTPATIENT)
Dept: OTOLARYNGOLOGY | Facility: CLINIC | Age: 67
End: 2022-08-11
Payer: COMMERCIAL

## 2022-08-11 VITALS — HEIGHT: 66 IN | BODY MASS INDEX: 25.88 KG/M2 | WEIGHT: 161 LBS

## 2022-08-11 DIAGNOSIS — R42 DIZZINESS: Primary | ICD-10-CM

## 2022-08-11 PROCEDURE — 1126F AMNT PAIN NOTED NONE PRSNT: CPT | Performed by: OTOLARYNGOLOGY

## 2022-08-11 PROCEDURE — 99203 OFFICE O/P NEW LOW 30 MIN: CPT | Performed by: OTOLARYNGOLOGY

## 2022-08-11 PROCEDURE — 3008F BODY MASS INDEX DOCD: CPT | Performed by: OTOLARYNGOLOGY

## 2022-08-16 ENCOUNTER — OFFICE VISIT (OUTPATIENT)
Dept: PODIATRY CLINIC | Facility: CLINIC | Age: 67
End: 2022-08-16
Payer: COMMERCIAL

## 2022-08-16 DIAGNOSIS — M76.61 ACHILLES TENDINITIS OF RIGHT LOWER EXTREMITY: Primary | ICD-10-CM

## 2022-08-16 PROCEDURE — 99213 OFFICE O/P EST LOW 20 MIN: CPT | Performed by: PODIATRIST

## 2022-09-13 ENCOUNTER — TELEPHONE (OUTPATIENT)
Dept: PHYSICAL THERAPY | Facility: HOSPITAL | Age: 67
End: 2022-09-13

## 2022-09-23 ENCOUNTER — TELEPHONE (OUTPATIENT)
Dept: PHYSICAL THERAPY | Facility: HOSPITAL | Age: 67
End: 2022-09-23

## 2022-09-27 ENCOUNTER — OFFICE VISIT (OUTPATIENT)
Dept: PHYSICAL THERAPY | Age: 67
End: 2022-09-27
Attending: PODIATRIST

## 2022-09-27 DIAGNOSIS — M76.61 ACHILLES TENDINITIS OF RIGHT LOWER EXTREMITY: ICD-10-CM

## 2022-09-27 PROCEDURE — 97161 PT EVAL LOW COMPLEX 20 MIN: CPT

## 2022-09-27 PROCEDURE — 97110 THERAPEUTIC EXERCISES: CPT

## 2022-09-29 ENCOUNTER — OFFICE VISIT (OUTPATIENT)
Dept: PHYSICAL THERAPY | Age: 67
End: 2022-09-29
Attending: PODIATRIST

## 2022-09-29 PROCEDURE — 97035 APP MDLTY 1+ULTRASOUND EA 15: CPT

## 2022-09-29 PROCEDURE — 97110 THERAPEUTIC EXERCISES: CPT

## 2022-10-04 ENCOUNTER — OFFICE VISIT (OUTPATIENT)
Dept: PHYSICAL THERAPY | Age: 67
End: 2022-10-04
Attending: PODIATRIST
Payer: MEDICARE

## 2022-10-04 PROCEDURE — 97110 THERAPEUTIC EXERCISES: CPT

## 2022-10-04 PROCEDURE — 97035 APP MDLTY 1+ULTRASOUND EA 15: CPT

## 2022-10-04 PROCEDURE — 97140 MANUAL THERAPY 1/> REGIONS: CPT

## 2022-10-06 ENCOUNTER — OFFICE VISIT (OUTPATIENT)
Dept: PHYSICAL THERAPY | Age: 67
End: 2022-10-06
Attending: PODIATRIST
Payer: MEDICARE

## 2022-10-06 PROCEDURE — 97035 APP MDLTY 1+ULTRASOUND EA 15: CPT

## 2022-10-06 PROCEDURE — 97110 THERAPEUTIC EXERCISES: CPT

## 2022-10-06 PROCEDURE — 97140 MANUAL THERAPY 1/> REGIONS: CPT

## 2022-10-11 ENCOUNTER — OFFICE VISIT (OUTPATIENT)
Dept: PHYSICAL THERAPY | Age: 67
End: 2022-10-11
Attending: PODIATRIST
Payer: MEDICARE

## 2022-10-11 PROCEDURE — 97110 THERAPEUTIC EXERCISES: CPT

## 2022-10-11 PROCEDURE — 97140 MANUAL THERAPY 1/> REGIONS: CPT

## 2022-10-11 PROCEDURE — 97035 APP MDLTY 1+ULTRASOUND EA 15: CPT

## 2022-10-18 ENCOUNTER — OFFICE VISIT (OUTPATIENT)
Dept: PHYSICAL THERAPY | Age: 67
End: 2022-10-18
Attending: PODIATRIST
Payer: MEDICARE

## 2022-10-18 PROCEDURE — 97110 THERAPEUTIC EXERCISES: CPT

## 2022-10-18 PROCEDURE — 97140 MANUAL THERAPY 1/> REGIONS: CPT

## 2022-10-18 PROCEDURE — 97035 APP MDLTY 1+ULTRASOUND EA 15: CPT

## 2022-10-25 ENCOUNTER — HOSPITAL ENCOUNTER (OUTPATIENT)
Dept: MAMMOGRAPHY | Age: 67
Discharge: HOME | End: 2022-10-25
Attending: INTERNAL MEDICINE
Payer: MEDICARE

## 2022-10-25 DIAGNOSIS — Z12.31 SCREENING MAMMOGRAM, ENCOUNTER FOR: ICD-10-CM

## 2022-10-25 PROCEDURE — 77067 SCR MAMMO BI INCL CAD: CPT | Performed by: INTERNAL MEDICINE

## 2022-10-25 PROCEDURE — 77063 BREAST TOMOSYNTHESIS BI: CPT | Performed by: INTERNAL MEDICINE

## 2022-10-26 ENCOUNTER — OFFICE VISIT (OUTPATIENT)
Dept: PHYSICAL THERAPY | Age: 67
End: 2022-10-26
Attending: PODIATRIST
Payer: MEDICARE

## 2022-10-26 PROCEDURE — 97140 MANUAL THERAPY 1/> REGIONS: CPT

## 2022-10-26 PROCEDURE — 97110 THERAPEUTIC EXERCISES: CPT

## 2022-10-26 PROCEDURE — 97035 APP MDLTY 1+ULTRASOUND EA 15: CPT

## 2022-12-11 DIAGNOSIS — F33.0 MILD RECURRENT MAJOR DEPRESSION (HCC): ICD-10-CM

## 2022-12-12 RX ORDER — ATORVASTATIN CALCIUM 40 MG/1
40 TABLET, FILM COATED ORAL DAILY
Qty: 90 TABLET | Refills: 1 | Status: SHIPPED | OUTPATIENT
Start: 2022-12-12

## 2022-12-12 NOTE — TELEPHONE ENCOUNTER
Refill passed per [place], [facility] protocol.       Requested Prescriptions   Pending Prescriptions Disp Refills    ATORVASTATIN 40 MG Oral Tab [Pharmacy Med Name: ATORVASTATIN 40MG TABLETS] 90 tablet 1     Sig: TAKE 1 TABLET(40 MG) BY MOUTH DAILY       Cholesterol Medication Protocol Passed - 12/11/2022  7:40 PM        Passed - ALT in past 12 months        Passed - LDL in past 12 months        Passed - Last ALT < 80     Lab Results   Component Value Date    ALT 34 05/31/2022             Passed - Last LDL < 130     Lab Results   Component Value Date     (H) 05/31/2022             Passed - In person appointment or virtual visit in the past 12 mos or appointment in next 3 mos     Recent Outpatient Visits              1 month ago     [address] [place], [place]    Office Visit    1 month ago     [address] [place], [place]    Office Visit    2 months ago     [address] [place], [place]    Office Visit    2 months ago     [address] [place], [place]    Office Visit    2 months ago     [address] [place], [place]    Office Visit                                Recent Outpatient Visits              1 month ago     [address] [place], [place]    Office Visit    1 month ago     [address] [place], [place]    Office Visit    2 months ago     [address] [place], [place]    Office Visit    2 months ago     [address] [place], [place]    Office Visit    2 months ago     [address] [place], [place]    Office Visit

## 2023-03-29 ENCOUNTER — PATIENT MESSAGE (OUTPATIENT)
Dept: INTERNAL MEDICINE CLINIC | Facility: CLINIC | Age: 68
End: 2023-03-29

## 2023-03-29 LAB — AMB EXT COVID-19 RESULT: DETECTED

## 2023-03-30 ENCOUNTER — NURSE TRIAGE (OUTPATIENT)
Dept: INTERNAL MEDICINE CLINIC | Facility: CLINIC | Age: 68
End: 2023-03-30

## 2023-03-30 NOTE — TELEPHONE ENCOUNTER
Isaias Yung RN 3/30/2023 8:46 AM CDT      ----- Message -----  From: Vernon Centeno  Sent: 3/29/2023 8:00 PM CDT  To: Em Triage Support  Subject: Just tested positive for Covid     Is there an anti-viral drug I should be taking based on my current health. ? Please get bask to me as soon as possible. . it is now Wednesday evening.     Thanks,    Doris Beatty

## 2023-06-10 DIAGNOSIS — F33.0 MILD RECURRENT MAJOR DEPRESSION (HCC): ICD-10-CM

## 2023-06-11 RX ORDER — ATORVASTATIN CALCIUM 40 MG/1
TABLET, FILM COATED ORAL
Qty: 90 TABLET | Refills: 1 | Status: SHIPPED | OUTPATIENT
Start: 2023-06-11

## 2023-06-11 NOTE — TELEPHONE ENCOUNTER
Please review. Protocol failed / No protocol.    Requested Prescriptions   Pending Prescriptions Disp Refills    ATORVASTATIN 40 MG Oral Tab [Pharmacy Med Name: ATORVASTATIN 40MG TABLETS] 90 tablet 1     Sig: TAKE 1 TABLET(40 MG) BY MOUTH DAILY       Cholesterol Medication Protocol Failed - 6/10/2023  3:03 PM        Failed - ALT in past 12 months        Failed - LDL in past 12 months        Failed - Last ALT < 80     Lab Results   Component Value Date    ALT 34 05/31/2022             Failed - Last LDL < 130     Lab Results   Component Value Date     (H) 05/31/2022               Passed - In person appointment or virtual visit in the past 12 mos or appointment in next 3 mos     Recent Outpatient Visits              7 months ago     [facility] [place]    Office Visit    7 months ago     [facility] [place]    Office Visit    8 months ago     [facility] [place]    Office Visit    8 months ago     [facility] [place]    Office Visit    8 months ago     [facility] [place]    Office Visit          Future Appointments         Provider Department Appt Notes    In 3 days Tessy Ward MD [address], [address] last 5/9/22 Nothing in particular                   Future Appointments         Provider Department Appt Notes    In 3 days Tessy Ward MD [address], [address] last 5/9/22 Nothing in particular           Recent Outpatient Visits              7 months ago     [facility] [place]    Office Visit    7 months ago     [facility] [place]    Office Visit    8 months ago     [facility] [place]    Office Visit    8 months ago     [facility] [place]    Office Visit    8 months ago     [facility] [place]    Office Visit

## 2023-06-14 ENCOUNTER — OFFICE VISIT (OUTPATIENT)
Dept: INTERNAL MEDICINE CLINIC | Facility: CLINIC | Age: 68
End: 2023-06-14

## 2023-06-14 VITALS
BODY MASS INDEX: 26.03 KG/M2 | OXYGEN SATURATION: 97 % | DIASTOLIC BLOOD PRESSURE: 84 MMHG | SYSTOLIC BLOOD PRESSURE: 138 MMHG | HEART RATE: 74 BPM | HEIGHT: 66 IN | WEIGHT: 162 LBS

## 2023-06-14 DIAGNOSIS — F33.0 MILD RECURRENT MAJOR DEPRESSION (HCC): Chronic | ICD-10-CM

## 2023-06-14 DIAGNOSIS — M17.12 PRIMARY OSTEOARTHRITIS OF LEFT KNEE: ICD-10-CM

## 2023-06-14 DIAGNOSIS — E55.9 VITAMIN D DEFICIENCY: ICD-10-CM

## 2023-06-14 DIAGNOSIS — Z01.419 VISIT FOR PELVIC EXAM: ICD-10-CM

## 2023-06-14 DIAGNOSIS — Z12.83 SKIN CANCER SCREENING: ICD-10-CM

## 2023-06-14 DIAGNOSIS — M17.11 PRIMARY OSTEOARTHRITIS OF RIGHT KNEE: ICD-10-CM

## 2023-06-14 DIAGNOSIS — R47.89 WORD FINDING DIFFICULTY: ICD-10-CM

## 2023-06-14 DIAGNOSIS — Z96.653 HISTORY OF BILATERAL KNEE REPLACEMENT: ICD-10-CM

## 2023-06-14 DIAGNOSIS — R73.03 BORDERLINE DIABETES: ICD-10-CM

## 2023-06-14 DIAGNOSIS — L91.8 SKIN TAGS, MULTIPLE ACQUIRED: ICD-10-CM

## 2023-06-14 DIAGNOSIS — Z12.31 SCREENING MAMMOGRAM, ENCOUNTER FOR: ICD-10-CM

## 2023-06-14 DIAGNOSIS — Z00.00 ENCOUNTER FOR ANNUAL HEALTH EXAMINATION: Primary | ICD-10-CM

## 2023-06-14 DIAGNOSIS — M54.32 SCIATICA OF LEFT SIDE: ICD-10-CM

## 2023-06-14 DIAGNOSIS — E78.2 MIXED HYPERLIPIDEMIA: ICD-10-CM

## 2023-06-14 DIAGNOSIS — M54.12 CERVICAL RADICULITIS: ICD-10-CM

## 2023-06-14 DIAGNOSIS — Z82.0 FAMILY HISTORY OF ALZHEIMER'S DISEASE: ICD-10-CM

## 2023-06-18 PROBLEM — E55.9 VITAMIN D DEFICIENCY: Status: ACTIVE | Noted: 2023-06-18

## 2023-06-18 PROBLEM — R47.89 WORD FINDING DIFFICULTY: Status: ACTIVE | Noted: 2023-06-18

## 2023-06-18 PROBLEM — Z82.0 FAMILY HISTORY OF ALZHEIMER'S DISEASE: Status: ACTIVE | Noted: 2023-06-18

## 2023-06-27 ENCOUNTER — MED REC SCAN ONLY (OUTPATIENT)
Dept: INTERNAL MEDICINE CLINIC | Facility: CLINIC | Age: 68
End: 2023-06-27

## 2023-07-24 ENCOUNTER — TELEPHONE (OUTPATIENT)
Dept: NEUROLOGY | Facility: CLINIC | Age: 68
End: 2023-07-24

## 2023-07-24 NOTE — TELEPHONE ENCOUNTER
Rcvd referral for pt to see Dr. Maged Puckett; Lvm for pt to give a call back to the office to set up a time.

## 2023-08-09 ENCOUNTER — LAB ENCOUNTER (OUTPATIENT)
Dept: LAB | Age: 68
End: 2023-08-09
Attending: INTERNAL MEDICINE
Payer: MEDICARE

## 2023-08-09 DIAGNOSIS — E78.2 MIXED HYPERLIPIDEMIA: ICD-10-CM

## 2023-08-09 DIAGNOSIS — Z00.00 ENCOUNTER FOR ANNUAL HEALTH EXAMINATION: ICD-10-CM

## 2023-08-09 DIAGNOSIS — E55.9 VITAMIN D DEFICIENCY: ICD-10-CM

## 2023-08-09 DIAGNOSIS — R47.89 WORD FINDING DIFFICULTY: ICD-10-CM

## 2023-08-09 DIAGNOSIS — Z82.0 FAMILY HISTORY OF ALZHEIMER'S DISEASE: ICD-10-CM

## 2023-08-09 DIAGNOSIS — R73.03 BORDERLINE DIABETES: ICD-10-CM

## 2023-08-09 LAB
ALBUMIN SERPL-MCNC: 3.7 G/DL (ref 3.4–5)
ALBUMIN/GLOB SERPL: 1.2 {RATIO} (ref 1–2)
ALP LIVER SERPL-CCNC: 85 U/L
ALT SERPL-CCNC: 33 U/L
ANION GAP SERPL CALC-SCNC: 6 MMOL/L (ref 0–18)
AST SERPL-CCNC: 23 U/L (ref 15–37)
BILIRUB SERPL-MCNC: 0.6 MG/DL (ref 0.1–2)
BUN BLD-MCNC: 11 MG/DL (ref 7–18)
BUN/CREAT SERPL: 12.1 (ref 10–20)
CALCIUM BLD-MCNC: 9.5 MG/DL (ref 8.5–10.1)
CHLORIDE SERPL-SCNC: 104 MMOL/L (ref 98–112)
CHOLEST SERPL-MCNC: 173 MG/DL (ref ?–200)
CO2 SERPL-SCNC: 26 MMOL/L (ref 21–32)
CREAT BLD-MCNC: 0.91 MG/DL
DEPRECATED RDW RBC AUTO: 39.8 FL (ref 35.1–46.3)
EGFRCR SERPLBLD CKD-EPI 2021: 69 ML/MIN/1.73M2 (ref 60–?)
ERYTHROCYTE [DISTWIDTH] IN BLOOD BY AUTOMATED COUNT: 11.9 % (ref 11–15)
EST. AVERAGE GLUCOSE BLD GHB EST-MCNC: 120 MG/DL (ref 68–126)
FASTING PATIENT LIPID ANSWER: YES
FASTING STATUS PATIENT QL REPORTED: YES
FOLATE SERPL-MCNC: 8.2 NG/ML (ref 8.7–?)
GLOBULIN PLAS-MCNC: 3.2 G/DL (ref 2.8–4.4)
GLUCOSE BLD-MCNC: 115 MG/DL (ref 70–99)
HBA1C MFR BLD: 5.8 % (ref ?–5.7)
HCT VFR BLD AUTO: 38.9 %
HDLC SERPL-MCNC: 52 MG/DL (ref 40–59)
HGB BLD-MCNC: 13.5 G/DL
LDLC SERPL CALC-MCNC: 103 MG/DL (ref ?–100)
MCH RBC QN AUTO: 31.8 PG (ref 26–34)
MCHC RBC AUTO-ENTMCNC: 34.7 G/DL (ref 31–37)
MCV RBC AUTO: 91.7 FL
NONHDLC SERPL-MCNC: 121 MG/DL (ref ?–130)
OSMOLALITY SERPL CALC.SUM OF ELEC: 282 MOSM/KG (ref 275–295)
PLATELET # BLD AUTO: 262 10(3)UL (ref 150–450)
POTASSIUM SERPL-SCNC: 4.3 MMOL/L (ref 3.5–5.1)
PROT SERPL-MCNC: 6.9 G/DL (ref 6.4–8.2)
RBC # BLD AUTO: 4.24 X10(6)UL
SODIUM SERPL-SCNC: 136 MMOL/L (ref 136–145)
TRIGL SERPL-MCNC: 98 MG/DL (ref 30–149)
TSI SER-ACNC: 2.05 MIU/ML (ref 0.36–3.74)
VIT B12 SERPL-MCNC: 297 PG/ML (ref 193–986)
VIT D+METAB SERPL-MCNC: 33 NG/ML (ref 30–100)
VLDLC SERPL CALC-MCNC: 16 MG/DL (ref 0–30)
WBC # BLD AUTO: 3.3 X10(3) UL (ref 4–11)

## 2023-08-09 PROCEDURE — 84425 ASSAY OF VITAMIN B-1: CPT

## 2023-08-09 PROCEDURE — 87389 HIV-1 AG W/HIV-1&-2 AB AG IA: CPT

## 2023-08-09 PROCEDURE — 83036 HEMOGLOBIN GLYCOSYLATED A1C: CPT

## 2023-08-09 PROCEDURE — 36415 COLL VENOUS BLD VENIPUNCTURE: CPT

## 2023-08-09 PROCEDURE — 80061 LIPID PANEL: CPT

## 2023-08-09 PROCEDURE — 82607 VITAMIN B-12: CPT

## 2023-08-09 PROCEDURE — 85027 COMPLETE CBC AUTOMATED: CPT

## 2023-08-09 PROCEDURE — 82306 VITAMIN D 25 HYDROXY: CPT

## 2023-08-09 PROCEDURE — 82746 ASSAY OF FOLIC ACID SERUM: CPT

## 2023-08-09 PROCEDURE — 80053 COMPREHEN METABOLIC PANEL: CPT

## 2023-08-09 PROCEDURE — 86780 TREPONEMA PALLIDUM: CPT

## 2023-08-09 PROCEDURE — 84443 ASSAY THYROID STIM HORMONE: CPT

## 2023-08-11 LAB — T PALLIDUM AB SER QL: NEGATIVE

## 2023-08-13 LAB — VITAMIN B1 WHOLE BLD: 71.3 NMOL/L

## 2023-09-26 ENCOUNTER — OFFICE VISIT (OUTPATIENT)
Dept: OBGYN CLINIC | Facility: CLINIC | Age: 68
End: 2023-09-26

## 2023-09-26 VITALS
DIASTOLIC BLOOD PRESSURE: 76 MMHG | WEIGHT: 160 LBS | HEART RATE: 71 BPM | BODY MASS INDEX: 26 KG/M2 | SYSTOLIC BLOOD PRESSURE: 113 MMHG

## 2023-09-26 DIAGNOSIS — N95.2 VAGINAL ATROPHY: ICD-10-CM

## 2023-09-26 DIAGNOSIS — Z01.419 WELL WOMAN EXAM WITH ROUTINE GYNECOLOGICAL EXAM: Primary | ICD-10-CM

## 2023-09-26 PROCEDURE — 1160F RVW MEDS BY RX/DR IN RCRD: CPT | Performed by: OBSTETRICS & GYNECOLOGY

## 2023-09-26 PROCEDURE — 1126F AMNT PAIN NOTED NONE PRSNT: CPT | Performed by: OBSTETRICS & GYNECOLOGY

## 2023-09-26 PROCEDURE — 3074F SYST BP LT 130 MM HG: CPT | Performed by: OBSTETRICS & GYNECOLOGY

## 2023-09-26 PROCEDURE — 3078F DIAST BP <80 MM HG: CPT | Performed by: OBSTETRICS & GYNECOLOGY

## 2023-09-26 PROCEDURE — G0101 CA SCREEN;PELVIC/BREAST EXAM: HCPCS | Performed by: OBSTETRICS & GYNECOLOGY

## 2023-09-26 PROCEDURE — 1170F FXNL STATUS ASSESSED: CPT | Performed by: OBSTETRICS & GYNECOLOGY

## 2023-09-26 PROCEDURE — 1159F MED LIST DOCD IN RCRD: CPT | Performed by: OBSTETRICS & GYNECOLOGY

## 2023-09-26 RX ORDER — TRIAMCINOLONE ACETONIDE 1 MG/G
1 OINTMENT TOPICAL 2 TIMES DAILY PRN
Qty: 15 G | Refills: 3 | Status: SHIPPED | OUTPATIENT
Start: 2023-09-26

## 2023-09-26 NOTE — PROGRESS NOTES
Katalina Farooq is a 76year old female  No LMP recorded. Patient is postmenopausal. Patient presents with:  Gyn Problem: Annual, new pt  Presenting for well woman exam. Last pap smear was normal 2020. Last mammogram was normal 10/2022 with repeat scheduled. She would like a refill on triamcinolone cream.    OBSTETRICS HISTORY:  OB History     T0    L0    SAB0  IAB0  Ectopic0  Multiple0  Live Births0     GYNE HISTORY:  No LMP recorded. Patient is postmenopausal.    Sexual activity:   Not Currently      Partners:   Male        Menarche: 15YEARS OLD  Use of Birth Control (if yes, specify type): MENOPAUSE  Pap Date: 16  Pap Result Notes: PAP NEG / HPV NEG  Follow Up Recommendation: MAMMO BILAT 10-25-22 NEG      MEDICAL HISTORY:  Past Medical History:   Diagnosis Date    Anxiety     Anxiety state     Disorder of thyroid     HYPOTHYROID    Elbow fracture, left     GERD (gastroesophageal reflux disease)     High cholesterol     Hyperlipidemia     Medial meniscus tear         Neck pain     neck stenosis    OCD (obsessive compulsive disorder)     Osteoarthritis     Pathological dislocation of right upper arm     Sinusitis          SURGICAL HISTORY:  Past Surgical History:   Procedure Laterality Date    ARTHROSCOPY OF JOINT UNLISTED Left     knee    ARTHROSCOPY OF JOINT UNLISTED Right     knee    COLONOSCOPY N/A 2021    Procedure: COLONOSCOPY;  Surgeon: Laya Cordero MD;  Location: [facility] ENDO    CORRECT BUNION,OTHR METHODS Right     FOOT SURGERY Right     Bunionectomy Bone spur R Foot    FRACTURE SURGERY Left     elbow radial head replacement    KNEE REPLACEMENT SURGERY Bilateral     OTHER SURGICAL HISTORY Left     Head of the radius    TOTAL KNEE REPLACEMENT Right 3/20/17    DR. PATRICK    TOTAL KNEE REPLACEMENT Left 2017    DR. PATRICK       SOCIAL HISTORY:  Social History    Socioeconomic History      Marital status: Life Partner      Spouse name: Not on file Number of children: Not on file      Years of education: Not on file      Highest education level: Not on file    Occupational History      Not on file    Tobacco Use      Smoking status: Never      Smokeless tobacco: Never    Vaping Use      Vaping Use: Never used    Substance and Sexual Activity      Alcohol use: Yes        Alcohol/week: 4.0 standard drinks of alcohol        Types: 4 Glasses of wine per week        Comment: social      Drug use: No      Sexual activity: Not Currently        Partners: Male    Other Topics      Concerns:         Service: Not Asked        Blood Transfusions: Not Asked        Caffeine Concern: Yes          1 Cup Soda Daily        Occupational Exposure: Not Asked        Hobby Hazards: Not Asked        Sleep Concern: Not Asked        Stress Concern: Not Asked        Weight Concern: Not Asked        Special Diet: Not Asked        Back Care: Not Asked        Exercise: No        Bike Helmet: Not Asked        Seat Belt: Not Asked        Self-Exams: Not Asked    Social History Narrative      The patient does not use an assistive device. .        The patient does live in a home with stairs. Social Determinants of Health  Financial Resource Strain: Not on file  Food Insecurity: Not on file  Transportation Needs: Not on file  Physical Activity: Not on file  Stress: Not on file  Social Connections: Not on file  Housing Stability: Not on file      Depression Screening (PHQ-2/PHQ-9): Over the LAST 2 WEEKS   Little interest or pleasure in doing things (over the last two weeks)?: Not at all    Feeling down, depressed, or hopeless (over the last two weeks)?: Not at all    PHQ-2 SCORE: 0           MEDICATIONS:    Current Outpatient Medications:     triamcinolone 0.1 % External Ointment, Apply 1 Application  topically 2 (two) times daily as needed. , Disp: 15 g, Rfl: 3    ATORVASTATIN 40 MG Oral Tab, TAKE 1 TABLET(40 MG) BY MOUTH DAILY, Disp: 90 tablet, Rfl: 1    QUEtiapine Fumarate (SEROQUEL) 25 MG Oral Tab, Take 1 tablet (25 mg total) by mouth nightly., Disp: , Rfl: 0    fluticasone propionate 50 MCG/ACT Nasal Suspension, 2 sprays by Nasal route as needed. , Disp: , Rfl:     Escitalopram Oxalate (LEXAPRO) 20 MG Oral Tab, Take 1.5 tablets (30 mg total) by mouth daily. Given by psych, Disp: , Rfl: 0    ALLERGIES:    Penicillin G            HIVES  Penicillins             HIVES      Review of Systems:  Review of Systems   All other systems reviewed and are negative. 09/26/23  1052   BP: 113/76   Pulse: 71       PHYSICAL EXAM:   Physical Exam  Vitals reviewed. Constitutional:       Appearance: Normal appearance. HENT:      Head: Atraumatic. Eyes:      Pupils: Pupils are equal, round, and reactive to light. Pulmonary:      Effort: Pulmonary effort is normal.   Chest:   Breasts:     Right: Normal. No bleeding, inverted nipple, mass, nipple discharge, skin change or tenderness. Left: Normal. No bleeding, inverted nipple, mass, nipple discharge, skin change or tenderness. Abdominal:      General: Abdomen is flat. Palpations: Abdomen is soft. Tenderness: There is no abdominal tenderness. Genitourinary:     General: Normal vulva. Exam position: Lithotomy position. Labia:         Right: No rash, tenderness, lesion or injury. Left: No rash, tenderness, lesion or injury. Uterus: Normal. Not tender. Adnexa: Left adnexa normal.        Right: No tenderness or fullness. Left: No tenderness or fullness. Comments: Unable to perform speculum exam due to atrophy and patient discomfort  Lymphadenopathy:      Upper Body:      Right upper body: No supraclavicular, axillary or pectoral adenopathy. Left upper body: No supraclavicular, axillary or pectoral adenopathy. Skin:     General: Skin is warm and dry. Neurological:      General: No focal deficit present. Mental Status: She is alert and oriented to person, place, and time. Psychiatric:         Mood and Affect: Mood normal.         Behavior: Behavior normal.         Thought Content: Thought content normal.         Judgment: Judgment normal.           Assessment & Plan:  Shira Rao was seen today for gyn problem. Diagnoses and all orders for this visit:    Well woman exam with routine gynecological exam    Vaginal atrophy    Other orders  -     triamcinolone 0.1 % External Ointment; Apply 1 Application  topically 2 (two) times daily as needed. Requested Prescriptions     Signed Prescriptions Disp Refills    triamcinolone 0.1 % External Ointment 15 g 3     Sig: Apply 1 Application  topically 2 (two) times daily as needed. New ASSCP guidelines reviewed in detail and pap smears no longer indicated. Annual exams encouraged. Mammogram scheduled. Call if any vaginal bleeding. Encouraged 1500 mg calcium w/ vit D. Encouraged weight bearing exercise. Follow-up in one year.     Unable to perform speculum exam due to atrophy; she is not interested in estrace cream

## 2023-10-14 ENCOUNTER — HOSPITAL ENCOUNTER (EMERGENCY)
Facility: HOSPITAL | Age: 68
Discharge: HOME | End: 2023-10-14
Attending: EMERGENCY MEDICINE
Payer: MEDICARE

## 2023-10-14 ENCOUNTER — APPOINTMENT (OUTPATIENT)
Dept: GENERAL RADIOLOGY | Facility: HOSPITAL | Age: 68
End: 2023-10-14
Attending: EMERGENCY MEDICINE
Payer: MEDICARE

## 2023-10-14 VITALS
SYSTOLIC BLOOD PRESSURE: 146 MMHG | OXYGEN SATURATION: 94 % | DIASTOLIC BLOOD PRESSURE: 86 MMHG | TEMPERATURE: 98 F | BODY MASS INDEX: 26 KG/M2 | RESPIRATION RATE: 19 BRPM | WEIGHT: 160 LBS | HEART RATE: 64 BPM

## 2023-10-14 DIAGNOSIS — R07.9 CHEST PAIN OF UNCERTAIN ETIOLOGY: Primary | ICD-10-CM

## 2023-10-14 LAB
ANION GAP SERPL CALC-SCNC: 4 MMOL/L (ref 0–18)
ATRIAL RATE: 63 BPM
BASOPHILS # BLD AUTO: 0.04 X10(3) UL (ref 0–0.2)
BASOPHILS NFR BLD AUTO: 0.9 %
BUN BLD-MCNC: 14 MG/DL (ref 7–18)
BUN/CREAT SERPL: 14.4 (ref 10–20)
CALCIUM BLD-MCNC: 9.3 MG/DL (ref 8.5–10.1)
CHLORIDE SERPL-SCNC: 106 MMOL/L (ref 98–112)
CO2 SERPL-SCNC: 28 MMOL/L (ref 21–32)
CREAT BLD-MCNC: 0.97 MG/DL
DEPRECATED RDW RBC AUTO: 41.7 FL (ref 35.1–46.3)
EGFRCR SERPLBLD CKD-EPI 2021: 64 ML/MIN/1.73M2 (ref 60–?)
EOSINOPHIL # BLD AUTO: 0.1 X10(3) UL (ref 0–0.7)
EOSINOPHIL NFR BLD AUTO: 2.3 %
ERYTHROCYTE [DISTWIDTH] IN BLOOD BY AUTOMATED COUNT: 12.1 % (ref 11–15)
GLUCOSE BLD-MCNC: 96 MG/DL (ref 70–99)
HCT VFR BLD AUTO: 37.2 %
HGB BLD-MCNC: 12.8 G/DL
IMM GRANULOCYTES # BLD AUTO: 0.04 X10(3) UL (ref 0–1)
IMM GRANULOCYTES NFR BLD: 0.9 %
LYMPHOCYTES # BLD AUTO: 1.56 X10(3) UL (ref 1–4)
LYMPHOCYTES NFR BLD AUTO: 36.4 %
MCH RBC QN AUTO: 32.2 PG (ref 26–34)
MCHC RBC AUTO-ENTMCNC: 34.4 G/DL (ref 31–37)
MCV RBC AUTO: 93.5 FL
MONOCYTES # BLD AUTO: 0.47 X10(3) UL (ref 0.1–1)
MONOCYTES NFR BLD AUTO: 11 %
NEUTROPHILS # BLD AUTO: 2.08 X10 (3) UL (ref 1.5–7.7)
NEUTROPHILS # BLD AUTO: 2.08 X10(3) UL (ref 1.5–7.7)
NEUTROPHILS NFR BLD AUTO: 48.5 %
OSMOLALITY SERPL CALC.SUM OF ELEC: 286 MOSM/KG (ref 275–295)
P AXIS: 36 DEGREES
P-R INTERVAL: 148 MS
PLATELET # BLD AUTO: 228 10(3)UL (ref 150–450)
POTASSIUM SERPL-SCNC: 4.1 MMOL/L (ref 3.5–5.1)
Q-T INTERVAL: 404 MS
QRS DURATION: 78 MS
QTC CALCULATION (BEZET): 413 MS
R AXIS: 33 DEGREES
RBC # BLD AUTO: 3.98 X10(6)UL
SODIUM SERPL-SCNC: 138 MMOL/L (ref 136–145)
T AXIS: 56 DEGREES
TROPONIN I HIGH SENSITIVITY: 6 NG/L
VENTRICULAR RATE: 63 BPM
WBC # BLD AUTO: 4.3 X10(3) UL (ref 4–11)

## 2023-10-14 PROCEDURE — 80048 BASIC METABOLIC PNL TOTAL CA: CPT | Performed by: EMERGENCY MEDICINE

## 2023-10-14 PROCEDURE — 99284 EMERGENCY DEPT VISIT MOD MDM: CPT

## 2023-10-14 PROCEDURE — 71045 X-RAY EXAM CHEST 1 VIEW: CPT | Performed by: EMERGENCY MEDICINE

## 2023-10-14 PROCEDURE — 85025 COMPLETE CBC W/AUTO DIFF WBC: CPT | Performed by: EMERGENCY MEDICINE

## 2023-10-14 PROCEDURE — 84484 ASSAY OF TROPONIN QUANT: CPT | Performed by: EMERGENCY MEDICINE

## 2023-10-14 PROCEDURE — 93005 ELECTROCARDIOGRAM TRACING: CPT

## 2023-10-14 PROCEDURE — 93010 ELECTROCARDIOGRAM REPORT: CPT

## 2023-10-14 PROCEDURE — 99285 EMERGENCY DEPT VISIT HI MDM: CPT

## 2023-10-14 PROCEDURE — 36415 COLL VENOUS BLD VENIPUNCTURE: CPT

## 2023-10-14 NOTE — ED INITIAL ASSESSMENT (HPI)
X1 day \"dull, sometimes shooting\" chest pain underneath left breast, radiating to left shoulder blade    No respiratory distress, denies dizziness/nausea/vomiting

## 2023-11-29 DIAGNOSIS — F33.0 MILD RECURRENT MAJOR DEPRESSION (HCC): ICD-10-CM

## 2023-11-30 RX ORDER — ATORVASTATIN CALCIUM 40 MG/1
40 TABLET, FILM COATED ORAL DAILY
Qty: 90 TABLET | Refills: 3 | Status: SHIPPED | OUTPATIENT
Start: 2023-11-30

## 2023-11-30 NOTE — TELEPHONE ENCOUNTER
Refill passed per [facility] protocol.   Requested Prescriptions   Pending Prescriptions Disp Refills    ATORVASTATIN 40 MG Oral Tab [Pharmacy Med Name: ATORVASTATIN 40MG TABLETS] 90 tablet 1     Sig: TAKE 1 TABLET(40 MG) BY MOUTH DAILY       Cholesterol Medication Protocol Passed - 11/29/2023 12:02 PM        Passed - ALT in past 12 months        Passed - LDL in past 12 months        Passed - Last ALT < 80     Lab Results   Component Value Date    ALT 33 08/09/2023             Passed - Last LDL < 130     Lab Results   Component Value Date     (H) 08/09/2023             Passed - In person appointment or virtual visit in the past 12 mos or appointment in next 3 mos     Recent Outpatient Visits              2 months ago Well woman exam with routine gynecological exam    [facility] - OB[facility] Yue Bain MD    Office Visit    5 months ago Encounter for annual health examination    [facility] Babcock MD    Office Visit    1 year ago     [facility] April Brewer, PT    Office Visit    1 year ago     [facility] April Brewer, PT    Office Visit    1 year ago     [facility] April Brewer PT    Office Visit          Future Appointments         Provider Department Appt Notes    In 3 months ADDAPHNEY DEXA RM1; ADO DARWIN RM1 [facility] Mammography - [place]                    Recent Outpatient Visits              2 months ago Well woman exam with routine gynecological exam    [facility] - [facility]GY[facility] Yue Bain MD    Office Visit    5 months ago Encounter for annual health examination    [facility] Babcock MD    Office Visit    1 year ago     [facility]  April Forrester, PT    Office Visit    1 year ago     [facility] April Brewer, PT    Office Visit    1 year ago     [facility] April Brewer, PT    Office Visit           Future Appointments         Provider Department Appt Notes    In 3 months LINDA RAPP RM1; LINDA GRANADOS RM1 [facility] Mammography - [place]

## 2024-03-05 ENCOUNTER — HOSPITAL ENCOUNTER (OUTPATIENT)
Dept: MAMMOGRAPHY | Age: 69
Discharge: HOME | End: 2024-03-05
Attending: INTERNAL MEDICINE
Payer: MEDICARE

## 2024-03-05 DIAGNOSIS — Z12.31 SCREENING MAMMOGRAM, ENCOUNTER FOR: ICD-10-CM

## 2024-03-05 PROCEDURE — 77063 BREAST TOMOSYNTHESIS BI: CPT | Performed by: INTERNAL MEDICINE

## 2024-03-05 PROCEDURE — 77067 SCR MAMMO BI INCL CAD: CPT | Performed by: INTERNAL MEDICINE

## 2024-05-02 ENCOUNTER — TELEPHONE (OUTPATIENT)
Facility: CLINIC | Age: 69
End: 2024-05-02

## 2024-05-02 NOTE — TELEPHONE ENCOUNTER
Patient outreach message received:    3 year colonoscopy recall placed in patient outreach. Next due on 08/25/2024 per Dr. Reinoso.     Recall reminder letter mailed out to patient.

## 2024-05-23 ENCOUNTER — TELEPHONE (OUTPATIENT)
Dept: INTERNAL MEDICINE CLINIC | Facility: CLINIC | Age: 69
End: 2024-05-23

## 2024-05-23 DIAGNOSIS — Z12.11 ENCOUNTER FOR SCREENING COLONOSCOPY: Primary | ICD-10-CM

## 2024-05-23 NOTE — TELEPHONE ENCOUNTER
Patient is due for colonoscopy by 8/25/2024 and is requesting an order.     Please advise. Thank you!

## 2024-05-23 NOTE — TELEPHONE ENCOUNTER
Left detailed message per release of information regarding information for GI office.       Provider Address Phone   Rudi Reinoso MD [address]  [facility] [address] [phone]

## 2024-06-03 ENCOUNTER — TELEPHONE (OUTPATIENT)
Facility: CLINIC | Age: 69
End: 2024-06-03

## 2024-06-03 DIAGNOSIS — Z12.11 COLON CANCER SCREENING: Primary | ICD-10-CM

## 2024-06-03 DIAGNOSIS — Z86.010 HISTORY OF COLON POLYPS: ICD-10-CM

## 2024-06-03 NOTE — TELEPHONE ENCOUNTER
Rudi Reinoso MD   Physician  Gastroenterology     Operative Report     Signed     Date of Service: 8/25/2021  1:32 PM  Case Time: Procedures: Surgeons:   8/25/2021  1:02 PM COLONOSCOPY    Rudi Reinoso MD               Signed         [facility] Endoscopy Report        Preoperative Diagnosis:  - colon cancer screening        Postoperative Diagnosis:  - colon polyps x 6  - internal hemorrhoids        Procedure:    Colonoscopy        Surgeon:  Rudi Reinoso M.D.     Anesthesia:  MAC sedation     Technique:  After informed consent, the patient was placed in the left lateral recumbent position.  Digital rectal examination revealed no palpable intraluminal abnormalities.  An Olympus variable stiffness 190 series HD colonoscope was inserted into the rectum and advanced under direct vision by following the lumen to the cecum.  The colon was examined upon withdrawal in the left lateral position.     The procedure was well tolerated without immediate complication.        Findings:  The preparation of the colon was good.  The terminal ileum was examined for 4 cm and visually normal.  The ileocecal valve was well preserved. The visualized colonic mucosa from the cecum to the anal verge was normal with an intact vascular pattern.     Colon polyps x6 removed as follows;  -Descending x1, this was diminutive removed by cold forceps technique.  -Transverse x1, sessile serrated adenomatous appearance 1 cm in size and cold snare removed.  -Descending x1, sessile 5 mm in size and cold snare removed.  -Sigmoid x3, each of these polyps was diminutive removed by cold forceps technique.  All polypectomy sites were inspected and found to be free of bleeding and specimens retrieved and sent for analysis.     Small internal hemorrhoids noted on retroflexed view.     Estimated blood loss-insignificant  Specimens-colon polyps on above.  Impression:  - colon polyps x 6  - internal hemorrhoids     Recommendations:  -  Post polypectomy instructions given  - Repeat colonoscopy in 3 - 7 years  - Symptomatic treatment of hemorrhoids              Rudi Reinoso MD  8/25/2021  1:32 PM              uRdi Reinoso MD  8/26/2021  1:35 PM CDT       I wanted to get back to you with your colonoscopy results.  You had 6 colon polyps removed which were benign.  I would advise a repeat colonoscopy in 3 years to make sure no new polyps are forming.       You also have internal hemorrhoids.  Please stay on a high fiber diet and call with any questions.            Specimen to Pathology Tissue: [id number]  Order: [id number]   Collected 8/25/2021  1:04 PM       Status: Final result       Visible to patient: Yes (seen)       Dx: Screen for colon cancer    2 Result Notes       1 Patient Communication       1 Follow-up Encounter        Component  Ref Range & Units      Case Report     Surgical Pathology                                Case: [id number]                                     Authorizing Provider:  Rudi Reinoso MD       Collected:           08/25/2021 01:04 PM             Ordering Location:     [facility] Endoscopy   Received:            08/25/2021 03:15 PM             Pathologist:           Balbir Mckinney MD                                                           Specimens:   A) - Colon descending, descending x1                                                                  B) - Colon descending, transverse x1                                                                  C) - Colon descending, descending x1                                                                  D) - Colon descending, sigmoid x3                                                              Final Diagnosis:        A. Descending colon polyp x1:  Hyperplastic polyp.     B. Transverse colon polyp x1:  Tubular adenoma.     C. Descending colon polyp x1:   Hyperplastic polyp with focal features of sessile serrated adenoma.     D. Sigmoid colon polyps  x3:  Intermixed fragments of tubular adenoma and hyperplastic polyp.        Electronically signed by Balbir Mckinney MD on 8/26/2021 at  1:08 PM        Clinical Information      Z12.11 Screen For Colon Cancer.        Gross Description      Specimen A is submitted in formalin labeled “Jacobssen, descending colon polyp x1” and consists of one fragment of light pink soft tissue measuring 0.2 cm in greatest dimension, measuring 0.2 cm in greatest dimension. The specimen is inked with eosin and submitted entirely in cassette A1.     Specimen B is submitted in formalin labeled “Dirk, transverse colon polyp x1” and consists of one fragment of pink-tan soft tissue measuring 0.2 cm in greatest dimension. The specimen is submitted entirely in cassette B1.      Specimen C is submitted in formalin labeled “Jacobssen, descending colon polyp x1” and consists of two fragments of tan, soft tissue measuring in aggregate 1.1 x 0.6 x 0.2 cm. The specimen is submitted entirely in cassette C1.      Specimen D is submitted in formalin labeled “Dirk, sigmoid colon polyp x3” and consists of multiple fragments of tan soft tissue measuring in aggregate 1.2 x 0.7 x 0.3 cm. The specimen is submitted entirely in cassette D1. (jq)     Balbir Mckinney M.D./[facility]        Interpretation     Benign     Electronically signed by Balbir Mckinney MD on 8/26/2021 at  1:08 PM     [facility]                Specimen Collected: 08/25/21  1:04 PM Last Resulted: 08/26/21  1:08 PM

## 2024-06-03 NOTE — TELEPHONE ENCOUNTER
Beverly    Patient called to schedule 3 year recall colonoscopy with Dr. Reinoso.  Please provide orders if ok to schedule directly.    Thank you    Last Procedure, Date, MD:  Colonoscopy Dr. Reinoso 8/25/2021  Last Diagnosis:  colon polyps x6, internal hemorrhoids  Recalled (mth/yrs): 3 years  Sedation Used Previously:  MAC  Last Prep Used (if known):  Miralax/Gatorade  Quality Of Prep (if known): good  Anticoagulants: no  Diabetic Med's (PO/Injectables): no  Weight loss Med's: no  Iron/Herbal/Multivitamin Supplements (RX/OTC): no  Marijuana/Vaping/CBD: no  Height & Weight: 5'6\" 158 lbs  BMI: 25.5  Hx of Cardiac/CVA Issues/(MI/Stroke): no  Devices Pacemaker/Defibrillator/Stents: no  Respiratory Issues/Oxygen Use/ANA MARIA/COPD: she snores but never been diagnosed with sleep apnea  Issues w/ Anesthesia: no    Symptoms (Y/N): no  Symptoms Details: n/a    Special Comments/Notes: n/a    Please advise on orders and prep.     Thank you!

## 2024-06-04 NOTE — TELEPHONE ENCOUNTER
Please schedule with Dr. Reinoso   Reason: crc screening, history of colon polyps  Prep: miralax and gatorade    -Buy over the counter dulcolax laxative, and take one tablet daily for 3 days prior to drinking the bowel prep.     Sedation: MAC  Medications: can continue      Beverly Mooney PA-C

## 2024-06-06 NOTE — TELEPHONE ENCOUNTER
Scheduled for:  Colonoscopy 24286  Provider Name:  Dr. Reinoso  Date:  9/10/2024  Location:   [facility]  Sedation:  MAC  Time:  11:15 AM (patient is aware arrival time is 10:15 AM)   Prep:  Miralax/Gatorade  Meds/Allergies Reconciled?:  APN reviewed   Diagnosis with codes:  Colon cancer screening Z12.11; Hx: Colon polyps Z86.010  Was patient informed to call insurance with codes (Y/N):  Yes, I confirmed [employer] insurance with the patient.   Referral sent?:  Referral was sent at the time of electronic surgical scheduling.   EM or [employer] notified?:  I sent an electronic request to Endo Scheduling and received a confirmation today.   Medication Orders: N/A  Misc Orders: Buy over the counter dulcolax laxative, and take one tablet daily for 3 days prior to drinking the bowel prep      Further instructions given by staff:  I discussed the prep instructions with the patient which she verbally understood and is aware that I will send the instructions today.

## 2024-07-10 ENCOUNTER — LAB ENCOUNTER (OUTPATIENT)
Dept: LAB | Age: 69
End: 2024-07-10
Attending: INTERNAL MEDICINE
Payer: MEDICARE

## 2024-07-10 ENCOUNTER — OFFICE VISIT (OUTPATIENT)
Dept: INTERNAL MEDICINE CLINIC | Facility: CLINIC | Age: 69
End: 2024-07-10

## 2024-07-10 VITALS
SYSTOLIC BLOOD PRESSURE: 124 MMHG | HEIGHT: 66 IN | HEART RATE: 67 BPM | DIASTOLIC BLOOD PRESSURE: 78 MMHG | RESPIRATION RATE: 18 BRPM | WEIGHT: 158.5 LBS | BODY MASS INDEX: 25.47 KG/M2

## 2024-07-10 DIAGNOSIS — M17.11 PRIMARY OSTEOARTHRITIS OF RIGHT KNEE: ICD-10-CM

## 2024-07-10 DIAGNOSIS — E55.9 VITAMIN D DEFICIENCY: ICD-10-CM

## 2024-07-10 DIAGNOSIS — Z96.653 HISTORY OF BILATERAL KNEE REPLACEMENT: ICD-10-CM

## 2024-07-10 DIAGNOSIS — M54.12 CERVICAL RADICULITIS: ICD-10-CM

## 2024-07-10 DIAGNOSIS — R47.89 WORD FINDING DIFFICULTY: ICD-10-CM

## 2024-07-10 DIAGNOSIS — E53.8 FOLIC ACID DEFICIENCY: ICD-10-CM

## 2024-07-10 DIAGNOSIS — E78.2 MIXED HYPERLIPIDEMIA: ICD-10-CM

## 2024-07-10 DIAGNOSIS — Z82.0 FAMILY HISTORY OF ALZHEIMER'S DISEASE: ICD-10-CM

## 2024-07-10 DIAGNOSIS — M17.12 PRIMARY OSTEOARTHRITIS OF LEFT KNEE: ICD-10-CM

## 2024-07-10 DIAGNOSIS — M76.61 ACHILLES TENDINITIS, RIGHT LEG: ICD-10-CM

## 2024-07-10 DIAGNOSIS — Z00.00 ENCOUNTER FOR ANNUAL HEALTH EXAMINATION: Primary | ICD-10-CM

## 2024-07-10 DIAGNOSIS — F33.0 MILD RECURRENT MAJOR DEPRESSION (HCC): Chronic | ICD-10-CM

## 2024-07-10 DIAGNOSIS — R73.03 BORDERLINE DIABETES: ICD-10-CM

## 2024-07-10 DIAGNOSIS — F33.0 MILD RECURRENT MAJOR DEPRESSION (HCC): ICD-10-CM

## 2024-07-10 PROBLEM — M54.32 SCIATICA OF LEFT SIDE: Status: RESOLVED | Noted: 2017-10-02 | Resolved: 2024-07-10

## 2024-07-10 LAB
ALBUMIN SERPL-MCNC: 4.7 G/DL (ref 3.2–4.8)
ALBUMIN/GLOB SERPL: 1.9 {RATIO} (ref 1–2)
ALP LIVER SERPL-CCNC: 85 U/L
ALT SERPL-CCNC: 23 U/L
ANION GAP SERPL CALC-SCNC: 4 MMOL/L (ref 0–18)
AST SERPL-CCNC: 30 U/L (ref ?–34)
BILIRUB SERPL-MCNC: 0.8 MG/DL (ref 0.2–1.1)
BUN BLD-MCNC: 11 MG/DL (ref 9–23)
BUN/CREAT SERPL: 12.2 (ref 10–20)
CALCIUM BLD-MCNC: 9.8 MG/DL (ref 8.7–10.4)
CHLORIDE SERPL-SCNC: 107 MMOL/L (ref 98–112)
CHOLEST SERPL-MCNC: 207 MG/DL (ref ?–200)
CO2 SERPL-SCNC: 29 MMOL/L (ref 21–32)
CREAT BLD-MCNC: 0.9 MG/DL
DEPRECATED RDW RBC AUTO: 41.1 FL (ref 35.1–46.3)
EGFRCR SERPLBLD CKD-EPI 2021: 70 ML/MIN/1.73M2 (ref 60–?)
ERYTHROCYTE [DISTWIDTH] IN BLOOD BY AUTOMATED COUNT: 11.9 % (ref 11–15)
EST. AVERAGE GLUCOSE BLD GHB EST-MCNC: 114 MG/DL (ref 68–126)
FASTING PATIENT LIPID ANSWER: YES
FASTING STATUS PATIENT QL REPORTED: YES
FOLATE SERPL-MCNC: 11.8 NG/ML (ref 5.4–?)
GLOBULIN PLAS-MCNC: 2.5 G/DL (ref 2–3.5)
GLUCOSE BLD-MCNC: 104 MG/DL (ref 70–99)
HBA1C MFR BLD: 5.6 % (ref ?–5.7)
HCT VFR BLD AUTO: 39.2 %
HDLC SERPL-MCNC: 63 MG/DL (ref 40–59)
HGB BLD-MCNC: 13.7 G/DL
LDLC SERPL CALC-MCNC: 126 MG/DL (ref ?–100)
MCH RBC QN AUTO: 32.9 PG (ref 26–34)
MCHC RBC AUTO-ENTMCNC: 34.9 G/DL (ref 31–37)
MCV RBC AUTO: 94 FL
NONHDLC SERPL-MCNC: 144 MG/DL (ref ?–130)
OSMOLALITY SERPL CALC.SUM OF ELEC: 290 MOSM/KG (ref 275–295)
PLATELET # BLD AUTO: 239 10(3)UL (ref 150–450)
POTASSIUM SERPL-SCNC: 5 MMOL/L (ref 3.5–5.1)
PROT SERPL-MCNC: 7.2 G/DL (ref 5.7–8.2)
RBC # BLD AUTO: 4.17 X10(6)UL
SODIUM SERPL-SCNC: 140 MMOL/L (ref 136–145)
TRIGL SERPL-MCNC: 104 MG/DL (ref 30–149)
TSI SER-ACNC: 1.7 MIU/ML (ref 0.55–4.78)
VIT D+METAB SERPL-MCNC: 25.6 NG/ML (ref 30–100)
VLDLC SERPL CALC-MCNC: 18 MG/DL (ref 0–30)
WBC # BLD AUTO: 4.7 X10(3) UL (ref 4–11)

## 2024-07-10 PROCEDURE — 80061 LIPID PANEL: CPT

## 2024-07-10 PROCEDURE — 36415 COLL VENOUS BLD VENIPUNCTURE: CPT

## 2024-07-10 PROCEDURE — 85027 COMPLETE CBC AUTOMATED: CPT

## 2024-07-10 PROCEDURE — 84443 ASSAY THYROID STIM HORMONE: CPT

## 2024-07-10 PROCEDURE — 80053 COMPREHEN METABOLIC PANEL: CPT

## 2024-07-10 PROCEDURE — 82306 VITAMIN D 25 HYDROXY: CPT

## 2024-07-10 PROCEDURE — 82746 ASSAY OF FOLIC ACID SERUM: CPT

## 2024-07-10 PROCEDURE — 83036 HEMOGLOBIN GLYCOSYLATED A1C: CPT

## 2024-07-10 NOTE — PROGRESS NOTES
Subjective:   Pastora Cavazos is a 68 year old female who presents for a MA AHA (Medicare Advantage Annual Health Assessment) and scheduled follow up of multiple significant but stable problems.   Patient comes for her Medicare physical  Overall doing well but still has her chronic lower back pain and will see Dr. JONES at [facility] for this lower back pain and he was the same doctor she saw for her neck pain and who did the surgery  Still has to see neuro for cognitive assessment   Would like to see PT for her Achilles tendinitis but didn't keep up with the exercises from last time     History   Overall doing well but finding herself foregttful sometimes and having some word finding difficulties --very concerned bc father had alzheimers      PMH  HL  Cervical radiculitis -seeing a spine specialist at [facility] - did see dr camacho as well   Anxiety --sees a therapist and psychiatrist.  Sciatica   Allergic rhinitis   OA knee   (stable on medications for her lichens sclerosis)   h/o covid 3/2023      Lives with wife   Retired teacher   -------------------------------------------------------------------------------------             History/Other:   Fall Risk Assessment:   She has been screened for Falls and is High Risk. Fall Prevention information provided to patient in After Visit Summary.    Do you feel unsteady when standing or walking?: Yes  Do you worry about falling?: Yes  Have you fallen in the past year?: No     Cognitive Assessment:   She had a completely normal cognitive assessment - see flowsheet entries     Functional Ability/Status:   Pastora Cavazos has some abnormal functions as listed below:  She has Vision problems based on screening of functional status.       Depression Screening (PHQ):  PHQ-2 SCORE: 0  , done 7/10/2024             Advanced Directives:   She does NOT have a Living Will. [Do you have a living will?: No]  She does have a POA but we do NOT have it on file in Epic.    Discussed Advance Care  Planning with patient (and family/surrogate if present). Standard forms made available to patient in After Visit Summary.      Patient Active Problem List   Diagnosis    Hyperlipidemia    Cervical radiculitis    Primary osteoarthritis of right knee    Primary osteoarthritis of left knee    History of bilateral knee replacement    Mild recurrent major depression (HCC)    Borderline diabetes    Word finding difficulty    Family history of Alzheimer's disease    Vitamin D deficiency     Allergies:  She is allergic to penicillin g and penicillins.    Current Medications:  Outpatient Medications Marked as Taking for the 7/10/24 encounter (Office Visit) with Ana Babcock MD   Medication Sig    atorvastatin 40 MG Oral Tab Take 1 tablet (40 mg total) by mouth daily.    triamcinolone 0.1 % External Ointment Apply 1 Application  topically 2 (two) times daily as needed.    QUEtiapine Fumarate (SEROQUEL) 25 MG Oral Tab Take 1 tablet (25 mg total) by mouth nightly.    fluticasone propionate 50 MCG/ACT Nasal Suspension 2 sprays by Nasal route as needed.    Escitalopram Oxalate (LEXAPRO) 20 MG Oral Tab Take 0.5 tablets (10 mg total) by mouth daily. Given by psych       Medical History:  She  has a past medical history of Anxiety, Anxiety state, Disorder of thyroid, Elbow fracture, left, GERD (gastroesophageal reflux disease), High cholesterol, Hyperlipidemia, Medial meniscus tear, Neck pain, OCD (obsessive compulsive disorder), Osteoarthritis, Pathological dislocation of right upper arm, and Sinusitis.  Surgical History:  She  has a past surgical history that includes foot surgery (Right, 1986); Fracture surgery (Left, 2015); arthroscopy of joint unlisted (Left); arthroscopy of joint unlisted (Right); total knee replacement (Right, 3/20/17); correct bunion,othr methods (Right); total knee replacement (Left, 07/31/2017); knee replacement surgery (Bilateral, 2017); other surgical history (Left); and colonoscopy (N/A, 8/25/2021).    Family History:  Her family history includes Alzheimer's Disease in an other family member; Cancer in her mother; Heart Disease in an other family member; Heart Disorder in her mother; Myocardial Infarction in her brother; Psychiatric in her father.  Social History:  She  reports that she has never smoked. She has never been exposed to tobacco smoke. She has never used smokeless tobacco. She reports current alcohol use of about 4.0 standard drinks of alcohol per week. She reports that she does not use drugs.    Tobacco:  She has never smoked tobacco.    CAGE Alcohol Screen:   CAGE screening score of 0 on 7/10/2024, showing low risk of alcohol abuse.      Patient Care Team:  Ana Babcock MD as PCP - General (Internal Medicine)  Rod Isbell DO (Physical Medicine)  Yue Bain MD (OBSTETRICS & GYNECOLOGY)    Review of Systems  GENERAL: feels well otherwise  SKIN: denies any unusual skin lesions  EYES: denies blurred vision or double vision, MIGHT NEED CATARACT SURGERY   HEENT: denies nasal congestion, sinus pain or ST  LUNGS: denies shortness of breath with exertion  CARDIOVASCULAR: denies chest pain on exertion  GI: denies abdominal pain, denies heartburn  : denies dysuria, vaginal discharge or itching, no complaint of urinary incontinence   MUSCULOSKELETAL: +  back pain  NEURO: denies headaches  PSYCHE: denies depression or anxiety-STABLE ON MEDS   HEMATOLOGIC: denies hx of anemia  ENDOCRINE: denies thyroid history  ALL/ASTHMA: denies hx of allergy or asthma-seasonal     Objective:   Physical Exam  GENERAL: well developed, well nourished,in no apparent distress  SKIN: no rashes,no suspicious lesions  HEENT: atraumatic, normocephalic,ears and throat are clear, no frontal maxillary sinus tenderness, pupils equal reactive to light bilaterally, extraocular muscles intact  NECK: supple,no adenopathy,NONTENDER    LUNGS: clear to auscultation, no wheeze  CARDIO: RRR without murmur  GI: good BS's,no masses or  tenderness  EXTREMITIES: no cyanosis, or edema, right Achilles tendon tenderness on palpation  Breast exam done by GYN      /78   Pulse 67   Resp 18   Ht 5' 6\" (1.676 m)   Wt 158 lb 8 oz (71.9 kg)   BMI 25.58 kg/m²  Estimated body mass index is 25.58 kg/m² as calculated from the following:    Height as of this encounter: 5' 6\" (1.676 m).    Weight as of this encounter: 158 lb 8 oz (71.9 kg).    Medicare Hearing Assessment:   Hearing Screening    Screening Method: Finger Rub  Finger Rub Result: Pass         Visual Acuity:   Right Eye Visual Acuity: Uncorrected Right Eye Chart Acuity: 20/25     Left Eye Chart Acuity: 20/25   Both Eyes Visual Acuity: Uncorrected Both Eyes Chart Acuity: 20/25   Able To Tolerate Visual Acuity: Yes        Assessment & Plan:   Pastora Cavazos is a 68 year old female who presents for a Medicare Assessment.     1. Encounter for annual health examination (Primary)  -     CT CALCIUM SCORING; Future; Expected date: 07/10/2024  Advised patient to watch what she eats and  exercise, seatbelt use no texting driving, sunscreen use advised  2. Borderline diabetes  -     Comp Metabolic Panel (14); Future; Expected date: 07/10/2024  -     Lipid Panel; Future; Expected date: 07/10/2024  -     Assay, Thyroid Stim Hormone; Future; Expected date: 07/10/2024  -     Vitamin D; Future; Expected date: 07/10/2024  -     Folic Acid Serum (Folate); Future; Expected date: 07/10/2024  -     CBC, Platelet; No Differential; Future; Expected date: 07/10/2024  -     Hemoglobin A1C; Future; Expected date: 07/10/2024  -     CT CALCIUM SCORING; Future; Expected date: 07/10/2024  Advised patient to follow low-carb low sugar diet and exercise with a goal of 30 minutes a day  3. Folic acid deficiency  -     Comp Metabolic Panel (14); Future; Expected date: 07/10/2024  -     Lipid Panel; Future; Expected date: 07/10/2024  -     Assay, Thyroid Stim Hormone; Future; Expected date: 07/10/2024  -     Vitamin D;  Future; Expected date: 07/10/2024  -     Folic Acid Serum (Folate); Future; Expected date: 07/10/2024  -     CBC, Platelet; No Differential; Future; Expected date: 07/10/2024  -     Hemoglobin A1C; Future; Expected date: 07/10/2024  Recheck , monitor   4. Mixed hyperlipidemia  -     Comp Metabolic Panel (14); Future; Expected date: 07/10/2024  -     Lipid Panel; Future; Expected date: 07/10/2024  -     Assay, Thyroid Stim Hormone; Future; Expected date: 07/10/2024  -     Vitamin D; Future; Expected date: 07/10/2024  -     Folic Acid Serum (Folate); Future; Expected date: 07/10/2024  -     CBC, Platelet; No Differential; Future; Expected date: 07/10/2024  -     Hemoglobin A1C; Future; Expected date: 07/10/2024  -     CT CALCIUM SCORING; Future; Expected date: 07/10/2024  Advised to follow low fat low cholesterol diet and exercise 30 minutes a day, continue medications   5. Mild recurrent major depression (HCC)  -     Comp Metabolic Panel (14); Future; Expected date: 07/10/2024  -     Lipid Panel; Future; Expected date: 07/10/2024  -     Assay, Thyroid Stim Hormone; Future; Expected date: 07/10/2024  -     Vitamin D; Future; Expected date: 07/10/2024  -     Folic Acid Serum (Folate); Future; Expected date: 07/10/2024  -     CBC, Platelet; No Differential; Future; Expected date: 07/10/2024  -     Hemoglobin A1C; Future; Expected date: 07/10/2024  Stable on meds   6. Vitamin D deficiency  -     Vitamin D; Future; Expected date: 07/10/2024  Monitor   7. Cervical radiculitis  Stable   8. Family history of Alzheimer's disease  Monitor   9. Word finding difficulty  Stable   10. Primary osteoarthritis of left knee  Stable   11. Primary osteoarthritis of right knee  Stable   12. History of bilateral knee replacement  Overview:  In march 2017- Rt and July 2017- Left.  Dr Dunham.  Stable   13. Achilles tendinitis, right leg  -     PHYSICAL THERAPY - INTERNAL  Will refer to PT as it has helped in the past     Preventative  medicine   Pap - dr marie - normal 8/2020 -no more paps   Labs 8/2023  reviewed   Mammogram  3/2024 normal   Colonoscopy- 8/2021 dr north and [facility]ha in 3 yrs -has apt       The patient indicates understanding of these issues and agrees to the plan.        Return in 1 year (on 7/10/2025).     Ana Babcock MD, 7/10/2024     Supplementary Documentation:   General Health:  In the past six months, have you lost more than 10 pounds without trying?: 2 - No  Has your appetite been poor?: No  Type of Diet: Balanced  How does the patient maintain a good energy level?: Appropriate Exercise;Daily Walks  How would you describe your daily physical activity?: Light  How would you describe your current health state?: Fair  How do you maintain positive mental well-being?: Social Interaction;Visiting Friends;Visiting Family  On a scale of 0 to 10, with 0 being no pain and 10 being severe pain, what is your pain level?: 4 - (Moderate)  In the past six months, have you experienced urine leakage?: 0-No  At any time do you feel concerned for the safety/well-being of yourself and/or your children, in your home or elsewhere?: No  Have you had any immunizations at another office such as Influenza, Hepatitis B, Tetanus, or Pneumococcal?: Yes    Health Maintenance   Topic Date Due    Zoster Vaccines (2 of 3) 11/20/2013    COVID-19 Vaccine (1 - 2023-24 season) Never done    MA Annual Health Assessment  01/01/2024    Colorectal Cancer Screening  08/25/2024    Influenza Vaccine (1) 10/01/2024    Mammogram  03/05/2025    DEXA Scan  Completed    Annual Depression Screening  Completed    Fall Risk Screening (Annual)  Completed    Pneumococcal Vaccine: 65+ Years  Completed

## 2024-07-24 ENCOUNTER — ORDER TRANSCRIPTION (OUTPATIENT)
Dept: PHYSICAL THERAPY | Facility: HOSPITAL | Age: 69
End: 2024-07-24

## 2024-07-25 ENCOUNTER — ORDER TRANSCRIPTION (OUTPATIENT)
Dept: PHYSICAL THERAPY | Facility: HOSPITAL | Age: 69
End: 2024-07-25

## 2024-07-25 DIAGNOSIS — M48.062 SPINAL STENOSIS OF LUMBAR REGION WITH NEUROGENIC CLAUDICATION: Primary | ICD-10-CM

## 2024-07-25 DIAGNOSIS — M47.816 LUMBAR SPONDYLOSIS: ICD-10-CM

## 2024-07-30 ENCOUNTER — TELEPHONE (OUTPATIENT)
Dept: PHYSICAL THERAPY | Facility: HOSPITAL | Age: 69
End: 2024-07-30

## 2024-08-02 ENCOUNTER — TELEPHONE (OUTPATIENT)
Dept: PHYSICAL THERAPY | Facility: HOSPITAL | Age: 69
End: 2024-08-02

## 2024-08-05 ENCOUNTER — TELEPHONE (OUTPATIENT)
Dept: PHYSICAL THERAPY | Facility: HOSPITAL | Age: 69
End: 2024-08-05

## 2024-08-06 ENCOUNTER — OFFICE VISIT (OUTPATIENT)
Dept: PHYSICAL THERAPY | Age: 69
End: 2024-08-06
Attending: INTERNAL MEDICINE
Payer: MEDICARE

## 2024-08-06 DIAGNOSIS — M76.61 ACHILLES TENDINITIS, RIGHT LEG: Primary | ICD-10-CM

## 2024-08-06 PROCEDURE — 97110 THERAPEUTIC EXERCISES: CPT

## 2024-08-06 PROCEDURE — 97162 PT EVAL MOD COMPLEX 30 MIN: CPT

## 2024-08-06 PROCEDURE — 97140 MANUAL THERAPY 1/> REGIONS: CPT

## 2024-08-06 NOTE — PROGRESS NOTES
LOWER EXTREMITY EVALUATION:     Diagnosis:     Achilles tendinitis, right leg (M76.61)        Referring Provider: Ana Babcock  Date of Evaluation:    8/6/2024    Precautions:  None Next MD visit:   none scheduled  Date of Surgery: n/a     PATIENT SUMMARY   Pastora Cavazos is a 69 year old female who presents to therapy today with complaints of right achilles tendon pain that has been on going for about 2 years. She states that her symptoms are aggravated with walking from prolonged sitting, prolonged walking, gardening. Pt describes pain level current 3-4/10, at best 0/10, at worst 6-7/10.    Current functional limitations include prolonged walking, standing from prolonged sitting.   Pastora describes prior level of function Ind with all ADLs and recreational activities. Pt goals include walk dogs with no pain.  Past medical history was reviewed with Pastora. Significant findings include   Past Medical History:    Anxiety    Anxiety state    Disorder of thyroid    HYPOTHYROID    Elbow fracture, left    GERD (gastroesophageal reflux disease)    High cholesterol    Hyperlipidemia    Medial meniscus tear    2012    Neck pain    neck stenosis    OCD (obsessive compulsive disorder)    Osteoarthritis    Pathological dislocation of right upper arm    Sinusitis       ASSESSMENT  Pastora presents to physical therapy evaluation with primary c/o right achilles pain. The results of the objective tests and measures show TTP at mid portion achilles tendon, moderate achilles tendon hypertrophy, decreased calf/soelus strength, gait dysfunction with antalgic gait.  Functional deficits include but are not limited to prolonged walking and walking from prolonged sitting.  Signs and symptoms are consistent with diagnosis of right achilles tendinopathy. Pt and PT discussed evaluation findings, pathology, POC and HEP.  Pt voiced understanding and performs HEP correctly without reported pain. Skilled Physical Therapy is medically necessary  to address the above impairments and reach functional goals.     OBJECTIVE:   Observation: moderate right achilles tendon hypertrophy  Palpation: severe TTP at mid portion of right achilles tendon  Sensation: diminished L5 dermatome on right    AROM: (* denotes performed with pain)  Hip Knee Foot/Ankle   Flexion: R nl; L nl  Extension: R nl; L nl  Abduction: R nl; L nl  ER: R nl; L nl  IR: R nl; L nl Flexion: R nl; L nl  Extension: R nl; L nl    DF: R nl; L nl  PF: R nl; L nl  INV: R nl; L nl  EV: R nl; L nl  Great toe ext: R nl; L nl     Accessory motion: nl    Flexibility:  Gastroc-soleus: R nl; L nl    Strength/MMT: (* denotes performed with pain)  Hip Knee Foot/Ankle   Flexion: R 5/5; L 5/5  Extension: R nt/5; L nt/5  Abduction: R nt/5; L nt/5  ER: R nt/5; L nt/5  IR: R nt/5; L nt/5 Flexion: R 5/5; L 5/5  Extension: R 5/5; L 5/5    DF: R 5/5; L 5/5  PF: R 4-/5; L 5/5  INV: R 4/5; L 5/5  EV: R 5/5; L 5/5  Great toe ext: R 4/5; L 5/5     Gait: pt ambulates on level ground with antalgia on right, slight less push off  Balance: SLS: R 10 sec, L 10 sec    Today’s Treatment and Response:   Pt education was provided on exam findings, treatment diagnosis, treatment plan, expectations, and prognosis. Pt was also provided recommendations for activity modifications, possible soreness after evaluation, postural corrections, ergonomics, pain science education , detrimental fear avoidance behaviors, importance of remaining active, and shoe wear.  Performed myofascial release at pt achilles tendon region followed by ambulation that demonstrated less pain. Provided pt with heel lift to utilize when walking dogs to minimize aggravation.  Performed seated isometric heel raise hold 10x10s added to HEP    Charges: PT Eval Moderate Complexity, MT 1, TE 1      Total Timed Treatment: 45 min     Total Treatment Time: 45 min     Based on clinical rationale and outcome measures, this evaluation involved Moderate Complexity decision  making due to 1-2 personal factors/comorbidities, 3 body structures involved/activity limitations, and unstable symptoms including changing pain levels.  PLAN OF CARE:    Goals: (to be met in 8 visits)  Pt will demonstrate improved DF AROM to >= 10 degrees to promote proper foot clearance during gait and greater ease descending stairs without compensation  Pt will have increased ankle strength to 5/5 throughout for improved ankle control with ADLs such as prolonged gait and stair negotiation (  Pt will report <0/10 pain with work and home activities such as prolonged walking   Pt will be independent and compliant with comprehensive HEP to maintain progress achieved in PT     Frequency / Duration: Patient will be seen for 1-2 x/week or a total of 8 visits over a 90 day period. Treatment will include: Gait training, Manual Therapy, Neuromuscular Re-education, Therapeutic Activities, Therapeutic Exercise, and Home Exercise Program instruction    Education or treatment limitation: None  Rehab Potential:excellent    LEFS Score  LEFS Score: 71.25 % (7/31/2024  8:57 PM)      Patient/Family/Caregiver was advised of these findings, precautions, and treatment options and has agreed to actively participate in planning and for this course of care.    Thank you for your referral. Please co-sign or sign and return this letter via fax as soon as possible to [phone]. If you have any questions, please contact me at Dept: [phone]    Sincerely,  Electronically signed by therapist: Tripp Robbins, PT  Physician's certification required: Yes  I certify the need for these services furnished under this plan of treatment and while under my care.    X___________________________________________________ Date____________________    Certification From: 8/6/2024  To:11/4/2024

## 2024-08-07 ENCOUNTER — TELEPHONE (OUTPATIENT)
Dept: PHYSICAL THERAPY | Facility: HOSPITAL | Age: 69
End: 2024-08-07

## 2024-08-12 ENCOUNTER — OFFICE VISIT (OUTPATIENT)
Dept: FAMILY MEDICINE CLINIC | Facility: CLINIC | Age: 69
End: 2024-08-12
Payer: MEDICARE

## 2024-08-12 VITALS
RESPIRATION RATE: 16 BRPM | BODY MASS INDEX: 25.07 KG/M2 | DIASTOLIC BLOOD PRESSURE: 82 MMHG | HEIGHT: 66 IN | HEART RATE: 64 BPM | WEIGHT: 156 LBS | SYSTOLIC BLOOD PRESSURE: 134 MMHG | TEMPERATURE: 98 F | OXYGEN SATURATION: 97 %
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
## 2024-08-12 RX ORDER — PREDNISOLONE ACETATE 10 MG/ML
SUSPENSION/ DROPS OPHTHALMIC
COMMUNITY
Start: 2024-07-30

## 2024-08-12 RX ORDER — MOXIFLOXACIN 5 MG/ML
SOLUTION/ DROPS OPHTHALMIC
COMMUNITY
Start: 2024-07-30

## 2024-08-12 NOTE — PROGRESS NOTES
CHIEF COMPLAINT:     Chief Complaint   Patient presents with    Bite     Sx 2 days - Itchy red bites on inner L elbow, R breast, and back  Denies fever, chills, body aches  Used betamethasone cream           HPI:    Pastora Cavazos is a 69 year old female who presents for evaluation of insect bites.  Per patient rash started in the past 2 days. Rash has been persisting since onset. The affected locations include left arm, upper back and right breast. Patient has treated rash with betamethasone cream without relief.  Associated symptoms include: + pruritus, no tenderness.   Pertinent negatives include no rash drainage, anorexia, congestion, cough, diarrhea, eye pain, facial edema, fatigue, fever, joint pain, rhinorrhea, shortness of breath, sore throat or vomiting.      Current Outpatient Medications   Medication Sig Dispense Refill    atorvastatin 40 MG Oral Tab Take 1 tablet (40 mg total) by mouth daily. 90 tablet 3    QUEtiapine Fumarate (SEROQUEL) 25 MG Oral Tab Take 1 tablet (25 mg total) by mouth nightly.  0    Escitalopram Oxalate (LEXAPRO) 20 MG Oral Tab Take 0.5 tablets (10 mg total) by mouth daily. Given by psych  0    moxifloxacin 0.5 % Ophthalmic Solution POST-OP: 1 drop in surgery eye QID for one week after surgery. Continue as directed. (Patient not taking: Reported on 8/12/2024)      prednisoLONE 1 % Ophthalmic Suspension POST-OP: 1 drop in surgery eye after surgery QID x 1 week, TID x 1 week, BID x 1 week, QD x 1 week (Patient not taking: Reported on 8/12/2024)      triamcinolone 0.1 % External Ointment Apply 1 Application  topically 2 (two) times daily as needed. (Patient not taking: Reported on 8/12/2024) 15 g 3    fluticasone propionate 50 MCG/ACT Nasal Suspension 2 sprays by Nasal route as needed. (Patient not taking: Reported on 8/12/2024)        Past Medical History:    Anxiety    Anxiety state    Disorder of thyroid    HYPOTHYROID    Elbow fracture, left    GERD (gastroesophageal reflux  disease)    High cholesterol    Hyperlipidemia    Medial meniscus tear    2012    Neck pain    neck stenosis    OCD (obsessive compulsive disorder)    Osteoarthritis    Pathological dislocation of right upper arm    Sinusitis      Past Surgical History:   Procedure Laterality Date    Arthroscopy of joint unlisted Left     knee    Arthroscopy of joint unlisted Right     knee    Colonoscopy N/A 8/25/2021    Procedure: COLONOSCOPY;  Surgeon: Rudi Reinoso MD;  Location: [facility]    Correct bunion,othr methods Right     Foot surgery Right 1986    Bunionectomy Bone spur R Foot    Fracture surgery Left 2015    elbow radial head replacement    Knee replacement surgery Bilateral 2017    Other surgical history Left     Head of the radius    Total knee replacement Right 3/20/17    DR. PATRICK    Total knee replacement Left 07/31/2017    DR. PATRICK      Family History   Problem Relation Age of Onset    Other (Myocardial Infarction[other]) Brother     Psychiatric Father         alzheimer's    Heart Disorder Mother     Cancer Mother         lung    Other (Alzheimer's Disease[other]) Other         Family H    Heart Disease Other         Family H/O      Social History     Socioeconomic History    Marital status: Life Partner   Tobacco Use    Smoking status: Never     Passive exposure: Never    Smokeless tobacco: Never   Vaping Use    Vaping status: Never Used   Substance and Sexual Activity    Alcohol use: Yes     Alcohol/week: 4.0 standard drinks of alcohol     Types: 4 Glasses of wine per week     Comment: social    Drug use: No    Sexual activity: Not Currently     Partners: Male   Other Topics Concern    Caffeine Concern Yes     Comment: 1 Cup Soda Daily    Exercise No   Social History Narrative    The patient does not use an assistive device..      The patient does live in a home with stairs.         REVIEW OF SYSTEMS:   GENERAL: feels well otherwise, no fever, no chills.  SKIN: Per HPI. No edema. No  ulcerations.  HEENT: Denies rhinorrhea, edema of the lips or swelling of throat.  CARDIOVASCULAR: Denies chest pains or palpitations.  LUNGS: Denies shortness of breath with exertion or rest. No cough or wheezing.  LYMPH: Denies enlargement of the lymph nodes.  MUSC/SKEL: Denies joint swelling or joint stiffness.  GI: Denies abdominal pain, N/V/C/D.  NEURO: Denies abnormal sensation, tingling of the skin, or numbness.      EXAM:   /82   Pulse 64   Temp 98 °F (36.7 °C)   Resp 16   Ht 5' 6\" (1.676 m)   Wt 156 lb (70.8 kg)   SpO2 97%   BMI 25.18 kg/m²   GENERAL: well developed, well nourished,in no apparent distress  SKIN: Rash/lesion(s): Pruritic erythematous wheals present (largest approx 1.5 cm in diameter) with central scabbed punctum with circumferential erythema noted to left AC space, 3 to right breast and 1 to left upper back. No drainage noted.   EYES: PERRLA, EOMI, conjunctiva are clear  HENT: Head atraumatic, normocephalic. TM's WNL bilaterally. Normal external nose. Nasal mucosa pink without edema. No erythema of the throat. Oropharynx moist without lesions.  NECK:  Supple. Non tender.  LUNGS: Clear to auscultation bilaterally. No increased work of breathing.   LYMPH: No  lymphadenopathy.     ASSESSMENT AND PLAN:   Pastora Cavazos is a 69 year old female who presents for evaluation of a rash. Findings are consistent with:    ASSESSMENT:  Encounter Diagnosis   Name Primary?    Multiple insect bites Yes       PLAN:   Comfort measures as described in Patient Instructions. Benadryl, Ice pack, steroid cream. Skin care discussed with patient.   Risks, benefits, and side effects of medication explained and discussed.  Seek care if no improvement in 1 week, sooner if new/worsening symptoms  Verbalizes understanding of these issues and agrees to the plan.

## 2024-08-13 ENCOUNTER — OFFICE VISIT (OUTPATIENT)
Dept: PHYSICAL THERAPY | Age: 69
End: 2024-08-13
Attending: INTERNAL MEDICINE
Payer: MEDICARE

## 2024-08-13 PROCEDURE — 97110 THERAPEUTIC EXERCISES: CPT

## 2024-08-13 PROCEDURE — 97140 MANUAL THERAPY 1/> REGIONS: CPT

## 2024-08-13 PROCEDURE — 97112 NEUROMUSCULAR REEDUCATION: CPT

## 2024-08-13 NOTE — PROGRESS NOTES
Diagnosis:      Achilles tendinitis, right leg (M76.61)        Referring Provider: Ana Babcock  Date of Evaluation:    8/6/2024    Precautions:  None Next MD visit:   none scheduled  Date of Surgery: n/a    Insurance Primary/Secondary: JOÃO [facility] / N/A     # Auth Visits: 8 visits            Subjective: Pt states that her pain has been better with walking her dog.  The heel lift has helped a lot to minimize aggravation and she has decreased frequency of walks to 2 times a week.    Pain: 4/10 at worst, 0/10 at rest today      Objective:  Observation: moderate right achilles tendon hypertrophy  Palpation: severe TTP at mid portion of right achilles tendon  Sensation: diminished L5 dermatome on right     Strength/MMT: (* denotes performed with pain)  Hip Foot/Ankle   Flexion: R 5/5; L 5/5  Extension: R nt/5; L nt/5  Abduction: R nt/5; L nt/5  ER: R nt/5; L nt/5  IR: R nt/5; L nt/5 DF: R 5/5; L 5/5  PF: R 4-/5; L 5/5  INV: R 4/5; L 5/5  EV: R 5/5; L 5/5  Great toe ext: R 4/5; L 5/5      Gait: pt ambulates on level ground with antalgia on right, slight less push off  Balance: SLS: R 10 sec, L 10 sec    Assessment: Performed DFR at achilles tendon today and initiated eccentric calf loading and foot intrinsic strengthening.  Tolerated well with no increase in pain. Demonstrates moderate weakness in right heel cord that will be addressed with progressive loading      Goals: (to be met in 8 visits)  Pt will demonstrate improved DF AROM to >= 10 degrees to promote proper foot clearance during gait and greater ease descending stairs without compensation  Pt will have increased ankle strength to 5/5 throughout for improved ankle control with ADLs such as prolonged gait and stair negotiation   Pt will report <0/10 pain with work and home activities such as prolonged walking   Pt will be independent and compliant with comprehensive HEP to maintain progress achieved in PT     Plan: continue with manual therapy for pain modulation  and progressing gastroc/soleus strength with load management.  Date: 8/13/2024  TX#: 2/8 Date:                 TX#: 3/ Date:                 TX#: 4/ Date:                 TX#: 5/ Date:   Tx#: 6/   MT (20 min)  - Dermal myofascial release at achilles tendon, vertical        TE (15 min)  - Eccentric heel raise, 3x5  - Split stance, big toe flexion, red TB, isometric,        NMRE (10 min)  - Toe yoga, sitting and standing  - Ambulation training with big toe push off, 50ft x 3              HEP:   - Eccentric heel raise, 3x5  - Toe yoga, sitting and standing  - Split stance, big toe flexion, red TB, isometric,     Charges: MT 1 , TE 1, NMRE 1       Total Timed Treatment: 45 min  Total Treatment Time: 45 min

## 2024-08-16 ENCOUNTER — OFFICE VISIT (OUTPATIENT)
Dept: PHYSICAL THERAPY | Age: 69
End: 2024-08-16
Attending: INTERNAL MEDICINE
Payer: MEDICARE

## 2024-08-16 PROCEDURE — 97140 MANUAL THERAPY 1/> REGIONS: CPT

## 2024-08-16 PROCEDURE — 97110 THERAPEUTIC EXERCISES: CPT

## 2024-08-16 NOTE — PROGRESS NOTES
Diagnosis:      Achilles tendinitis, right leg (M76.61)        Referring Provider: Ana Babcock  Date of Evaluation:    8/6/2024    Precautions:  None Next MD visit:   none scheduled  Date of Surgery: n/a    Insurance Primary/Secondary: AETJULIA [facility] / N/A     # Auth Visits: 8 visits            Subjective: Pt states that her achilles was more aggravated after last session, but symptoms came back down to baseline after 2 days.     Pain: 4/10 at worst, 0/10 at rest today      Objective:  - moderate right achilles tendon hypertrophy  - severe TTP at mid portion of right achilles tendon  - dermal myofascial restrictions at achilles tendon, medial lateral of the tendon vertical adhesions  - ankle PF 4-/5 on right  - deficit SL heel raise: 4/10 pain noted      Assessment: Performed DFR at achilles tendon today with focus on more vertical adhesions around the tender area and pt tolerated much better with no aggravation post.  Progressed eccentric heel raise adding deficit and instructed to perform 2x10 1x/day      Goals: (to be met in 8 visits)  Pt will demonstrate improved DF AROM to >= 10 degrees to promote proper foot clearance during gait and greater ease descending stairs without compensation  Pt will have increased ankle strength to 5/5 throughout for improved ankle control with ADLs such as prolonged gait and stair negotiation   Pt will report <0/10 pain with work and home activities such as prolonged walking   Pt will be independent and compliant with comprehensive HEP to maintain progress achieved in PT     Plan: continue with manual therapy for pain modulation and progressing gastroc/soleus strength with load management.  Date: 8/13/2024  TX#: 2/8 Date:  8/16/24               TX#: 3/8 Date:                 TX#: 4/ Date:                 TX#: 5/ Date:   Tx#: 6/   MT (20 min)  - Dermal myofascial release at achilles tendon, vertical  MT (35 min)  - Dermal myofascial release at achilles tendon, vertical, not mid achilles  but around where less TTP      TE (15 min)  - Eccentric heel raise, 3x5  - Split stance, big toe flexion, red TB, isometric,  TE (10 min)  - stair assessment  - eccentric deficit heel raise, 1x6      NMRE (10 min)  - Toe yoga, sitting and standing  - Ambulation training with big toe push off, 50ft x 3              HEP:   - Eccentric heel raise, 3x5  - Toe yoga, sitting and standing  - Split stance, big toe flexion, red TB, isometric,     Charges: MT 2 , TE 1       Total Timed Treatment: 45 min  Total Treatment Time: 45 min    Objective at IE:  Observation: moderate right achilles tendon hypertrophy  Palpation: severe TTP at mid portion of right achilles tendon  Sensation: diminished L5 dermatome on right     Strength/MMT: (* denotes performed with pain)  Hip Foot/Ankle   Flexion: R 5/5; L 5/5  Extension: R nt/5; L nt/5  Abduction: R nt/5; L nt/5  ER: R nt/5; L nt/5  IR: R nt/5; L nt/5 DF: R 5/5; L 5/5  PF: R 4-/5; L 5/5  INV: R 4/5; L 5/5  EV: R 5/5; L 5/5  Great toe ext: R 4/5; L 5/5      Gait: pt ambulates on level ground with antalgia on right, slight less push off  Balance: SLS: R 10 sec, L 10 sec

## 2024-08-20 ENCOUNTER — OFFICE VISIT (OUTPATIENT)
Dept: PHYSICAL THERAPY | Age: 69
End: 2024-08-20
Attending: INTERNAL MEDICINE
Payer: MEDICARE

## 2024-08-20 PROCEDURE — 97110 THERAPEUTIC EXERCISES: CPT

## 2024-08-20 PROCEDURE — 97140 MANUAL THERAPY 1/> REGIONS: CPT

## 2024-08-20 NOTE — PROGRESS NOTES
Diagnosis:      Achilles tendinitis, right leg (M76.61)        Referring Provider: Ana Babcock  Date of Evaluation:    8/6/2024    Precautions:  None Next MD visit:   none scheduled  Date of Surgery: n/a    Insurance Primary/Secondary: AETJULIA [facility] / N/A     # Auth Visits: 8 visits            Subjective: Pt states that she has a lot 2/10 pain with ambulation now, but she is more intentional with pushing off.  5/10 pain with walking dog.     Pain: 5/10 at worst, 2/10 on average      Objective:  - moderate right achilles tendon hypertrophy  - severe TTP at mid portion of right achilles tendon  - dermal myofascial restrictions at achilles tendon, medial lateral of the tendon vertical adhesions  - ankle PF 4-/5 on right  - deficit SL heel raise: 4/10 pain noted      Assessment: Performed DFR at achilles tendon today followed by BFR training to increase gastroc and soleus strength with out aggravating achilles tendon. She tolerated well and reported less achilles pain with walking post treatment      Goals: (to be met in 8 visits)  Pt will demonstrate improved DF AROM to >= 10 degrees to promote proper foot clearance during gait and greater ease descending stairs without compensation  Pt will have increased ankle strength to 5/5 throughout for improved ankle control with ADLs such as prolonged gait and stair negotiation   Pt will report <0/10 pain with work and home activities such as prolonged walking   Pt will be independent and compliant with comprehensive HEP to maintain progress achieved in PT     Plan: continue with manual therapy for pain modulation and progressing gastroc/soleus strength with load management.  Date: 8/13/2024  TX#: 2/8 Date:  8/16/24               TX#: 3/8 Date: 8/20/24                TX#: 4/8 Date:                 TX#: 5/ Date:   Tx#: 6/   MT (20 min)  - Dermal myofascial release at achilles tendon, vertical  MT (35 min)  - Dermal myofascial release at achilles tendon, vertical, not mid achilles  but around where less TTP MT (20 min)  - Dermal myofascial release at achilles tendon, vertical, not mid achilles but around where less TTP     TE (15 min)  - Eccentric heel raise, 3x5  - Split stance, big toe flexion, red TB, isometric,  TE (10 min)  - stair assessment  - eccentric deficit heel raise, 1x6 TE (23 min)  - BFR training, explanation of benefits and protocol  - 75% occlusion pressure, 30-15-15-15 with resisted ankle PF using rubber ball  - ambulation assessment     NMRE (10 min)  - Toe yoga, sitting and standing  - Ambulation training with big toe push off, 50ft x 3              HEP:   - Eccentric heel raise, 3x5  - Toe yoga, sitting and standing  - Split stance, big toe flexion, red TB, isometric,     Charges: MT 1 , TE 2       Total Timed Treatment: 43 min  Total Treatment Time: 43 min    Objective at IE:  Observation: moderate right achilles tendon hypertrophy  Palpation: severe TTP at mid portion of right achilles tendon  Sensation: diminished L5 dermatome on right     Strength/MMT: (* denotes performed with pain)  Hip Foot/Ankle   Flexion: R 5/5; L 5/5  Extension: R nt/5; L nt/5  Abduction: R nt/5; L nt/5  ER: R nt/5; L nt/5  IR: R nt/5; L nt/5 DF: R 5/5; L 5/5  PF: R 4-/5; L 5/5  INV: R 4/5; L 5/5  EV: R 5/5; L 5/5  Great toe ext: R 4/5; L 5/5      Gait: pt ambulates on level ground with antalgia on right, slight less push off  Balance: SLS: R 10 sec, L 10 sec

## 2024-08-23 ENCOUNTER — OFFICE VISIT (OUTPATIENT)
Dept: PHYSICAL THERAPY | Age: 69
End: 2024-08-23
Attending: INTERNAL MEDICINE
Payer: MEDICARE

## 2024-08-23 PROCEDURE — 97140 MANUAL THERAPY 1/> REGIONS: CPT

## 2024-08-23 PROCEDURE — 97110 THERAPEUTIC EXERCISES: CPT

## 2024-08-23 PROCEDURE — 97112 NEUROMUSCULAR REEDUCATION: CPT

## 2024-08-23 NOTE — PROGRESS NOTES
Diagnosis:      Achilles tendinitis, right leg (M76.61)        Referring Provider: Ana Babcock  Date of Evaluation:    8/6/2024    Precautions:  None Next MD visit:   none scheduled  Date of Surgery: n/a    Insurance Primary/Secondary: DAVIDTJULIA [facility] / N/A     # Auth Visits: 8 visits            Subjective: Pt states that overall her pain has been a bit worse.  Eccentric loading may have been too difficult. 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 Added nerve gliding to HEP and adjusted calf raises with modified eccentric loading. Tolerated much better and reported 1-2/10 pain at end of session.      Goals: (to be met in 8 visits)  Pt will demonstrate improved DF AROM to >= 10 degrees to promote proper foot clearance during gait and greater ease descending stairs without compensation  Pt will have increased ankle strength to 5/5 throughout for improved ankle control with ADLs such as prolonged gait and stair negotiation   Pt will report <0/10 pain with work and home activities such as prolonged walking   Pt will be independent and compliant with comprehensive HEP to maintain progress achieved in PT     Plan: continue with manual therapy for pain modulation and progressing gastroc/soleus strength with load management.  Date: 8/13/2024  TX#: 2/8 Date:  8/16/24               TX#: 3/8 Date: 8/20/24                TX#: 4/8 Date:                 TX#: 5/ Date:   Tx#: 6/   MT (20  min)  - Dermal myofascial release at achilles tendon, vertical  MT (35 min)  - Dermal myofascial release at achilles tendon, vertical, not mid achilles but around where less TTP MT (20 min)  - Dermal myofascial release at achilles tendon, vertical, not mid achilles but around where less TTP MT (20 min)  - Dermal myofascial release at achilles tendon, vertical, not mid achilles but around where less TTP    TE (15 min)  - Eccentric heel raise, 3x5  - Split stance, big toe flexion, red TB, isometric,  TE (10 min)  - stair assessment  - eccentric deficit heel raise, 1x6 TE (23 min)  - BFR training, explanation of benefits and protocol  - 75% occlusion pressure, 30-15-15-15 with resisted ankle PF using rubber ball  - ambulation assessment NMRE (15 min)  - neuroprovocation testing  - Supine sciatic nerve glide, ankle bias, 2x15  - Slump position, sciatic nerve flossing, 1x10, discontinued due to increase in back pain    NMRE (10 min)  - Toe yoga, sitting and standing  - Ambulation training with big toe push off, 50ft x 3   TE ( 8 min)  - Eccentric calf raise on right with left foot assist, 2x5           HEP:   - Eccentric heel raise, 3x5  - Toe yoga, sitting and standing  - Split stance, big toe flexion, red TB, isometric,     Charges: MT 1 , TE 1, NMRE 1       Total Timed Treatment: 43 min  Total Treatment Time: 43 min    Objective at IE:  Observation: moderate right achilles tendon hypertrophy  Palpation: severe TTP at mid portion of right achilles tendon  Sensation: diminished L5 dermatome on right     Strength/MMT: (* denotes performed with pain)  Hip Foot/Ankle   Flexion: R 5/5; L 5/5  Extension: R nt/5; L nt/5  Abduction: R nt/5; L nt/5  ER: R nt/5; L nt/5  IR: R nt/5; L nt/5 DF: R 5/5; L 5/5  PF: R 4-/5; L 5/5  INV: R 4/5; L 5/5  EV: R 5/5; L 5/5  Great toe ext: R 4/5; L 5/5      Gait: pt ambulates on level ground with antalgia on right, slight less push off  Balance: SLS: R 10 sec, L 10 sec

## 2024-08-26 ENCOUNTER — APPOINTMENT (OUTPATIENT)
Dept: PHYSICAL THERAPY | Facility: HOSPITAL | Age: 69
End: 2024-08-26
Attending: NURSE PRACTITIONER
Payer: MEDICARE

## 2024-08-26 ENCOUNTER — TELEPHONE (OUTPATIENT)
Dept: PHYSICAL THERAPY | Facility: HOSPITAL | Age: 69
End: 2024-08-26

## 2024-08-26 NOTE — PROGRESS NOTES
SPINE EVALUATION:     Diagnosis:   Spinal stenosis of lumbar region with neurogenic claudication (M48.062)  Lumbar spondylosis (M47.816)      Referring Provider: Samara Cui  Date of Evaluation:    8/26/2024    Precautions:  {PT_PRECAUTIONS:564::\"None\"} Next MD visit:   none scheduled  Date of Surgery: n/a     PATIENT SUMMARY   Pastora Cavazos is a 69 year old female who presents to therapy today with complaints of ***    History of Current Condition: ***  N/T: ***    Pt describes pain level current ***/10, at best ***/10, at worst ***/10.   Quality/location: ***  Relieving: ***  Aggravating: ***    Current functional limitations include ***.   Home set Up: ***  Occupation: ***      Pastora describes prior level of function ***. Pt goals include ***.  Past medical history was reviewed with Pastora. Significant findings include   Past Medical History:    Anxiety    Anxiety state    Disorder of thyroid    HYPOTHYROID    Elbow fracture, left    GERD (gastroesophageal reflux disease)    High cholesterol    Hyperlipidemia    Medial meniscus tear    2012    Neck pain    neck stenosis    OCD (obsessive compulsive disorder)    Osteoarthritis    Pathological dislocation of right upper arm    Sinusitis       Pt denies diplopia, dysarthria, dysphasia, dizziness, drop attacks, bowel/bladder changes, saddle anesthesia, and JOSE LE N/T.    ASSESSMENT  Pastora presents to physical therapy evaluation with primary c/o ***. The results of the objective tests and measures show ***.  Functional deficits include but are not limited to ***.  Signs and symptoms are consistent with diagnosis of ***. Pt and PT discussed evaluation findings, pathology, POC and HEP.  Pt voiced understanding and performs HEP correctly without reported pain. Skilled Physical Therapy is medically necessary to address the above impairments and reach functional goals.     OBJECTIVE:   Observation/Posture: ***  Neuro Screen: ***    Lumbar AROM: (* denotes  performed with pain)  Flexion: ***  Extension: ***  Sidebending: R ***; L ***  Rotation: R ***; L ***    Accessory motion: ***  Palpation: ***    Strength: (* denotes performed with pain)  LE   Hip flexion (L2): R ***/5; L ***/5  Hip abduction: R ***/5; L ***/5  Hip Extension: R ***/5; L ***/5   Hip ER: R ***/5; L ***/5  Hip IR: R ***/5; L ***/5  Knee Flexion: R ***/5; L ***/5   Knee extension (L3): R ***/5; L ***/5   DF (L4): R ***/5; L ***/5  Great Toe Ext (L5): R ***/5, L ***/5  PF (S1): R ***/5; L ***/5     Flexibility:   LE   Hip Flexor: R***, L ***  Hamstrings: R ***; L ***  Piriformis: R ***; L ***  Quads: R ***; L ***  Gastroc-soleus: R ***; L ***     Special tests:   Repeated Motions Testing: ***  Shift Correction: ***  Lumbar Quadrant Testing: ***  Slump: ***  SLR: R ***, L ***  Active SLR: R ***, L ***  Long Axis Distraction: ***  Prone Instability Test: ***    SI Cluster:  SI Distraction Provocation: ***  SI Compression Provocation: ***  Thigh Thrust:   Gaenslen's Provocation: R ***, L ***  Sacral Thrust: ***  Direct palpation of posterior SI ligament: R ***, L ***      Gait: pt ambulates on level ground with {pt/ot/slp gait:8355}.  Balance: SLS R ***, L ***    Today’s Treatment and Response:   Pt education was provided on exam findings, treatment diagnosis, treatment plan, expectations, and prognosis. Pt was also provided recommendations for {pt/ot/slp education:8354}  Patient was instructed in and issued a HEP for: ***    Charges: PT Eval {LOW/MODERATE/HIGH COMPLEXITY:7155}, ***      Total Timed Treatment: *** min     Total Treatment Time: *** min     Based on clinical rationale and outcome measures, this evaluation involved {LOW/MODERATE/HIGH COMPLEXITY:7155} decision making due to {1-2, 3+:7227} personal factors/comorbidities, {3, 4+:7228} body structures involved/activity limitations, and {Evolving/Unstable:7229} symptoms including {Vital sign response/changing pain levels:7230}.  PLAN OF CARE:     Goals: (to be met in *** visits)   Pt will improve transversus abdominis recruitment to perform proper isometric contraction without requiring verbal or tactile cuing to promote advancement of therex   Pt will demonstrate good understanding of proper posture and body mechanics to decrease pain and improve spinal safety   Pt will improve lumbar spine AROM flexion to >*** deg to allow increase ease with bending forward to don shoes   Pt will report improved symptom centralization and absence of radicular symptoms for 3 consecutive days to improve function with ADL   Pt will have decreased paraspinal mm tension to tolerate standing >*** minutes for work and home activities   Pt will demonstrate improved core strength to be able to perform *** with <***/10 pain   Pt will be independent and compliant with comprehensive HEP to maintain progress achieved in PT       Frequency / Duration: Patient will be seen for *** x/week or a total of *** visits over a 90 day period. Treatment will include: {Ortho Interventions:7087}    Education or treatment limitation: {TX_LIMIT:1927}  Rehab Potential:{GOOD:115}    {Pre and Post Scores:75953}    Patient/Family/Caregiver was advised of these findings, precautions, and treatment options and has agreed to actively participate in planning and for this course of care.    Thank you for your referral. Please co-sign or sign and return this letter via fax as soon as possible to [phone]. If you have any questions, please contact me at Dept: [phone]    Sincerely,  Electronically signed by therapist: Fawn Ford, PT    Physician's certification required: Yes  I certify the need for these services furnished under this plan of treatment and while under my care.    X___________________________________________________ Date____________________    Certification From: 8/26/2024  To:11/24/2024

## 2024-08-27 ENCOUNTER — OFFICE VISIT (OUTPATIENT)
Dept: PHYSICAL THERAPY | Age: 69
End: 2024-08-27
Attending: INTERNAL MEDICINE
Payer: MEDICARE

## 2024-08-27 PROCEDURE — 97140 MANUAL THERAPY 1/> REGIONS: CPT

## 2024-08-27 PROCEDURE — 97110 THERAPEUTIC EXERCISES: CPT

## 2024-08-27 NOTE — PROGRESS NOTES
Diagnosis:      Achilles tendinitis, right leg (M76.61)        Referring Provider: Ana Babcock  Date of Evaluation:    8/6/2024    Precautions:  None Next MD visit:   none scheduled  Date of Surgery: n/a    Insurance Primary/Secondary: JOÃO [facility] / N/A     # Auth Visits: 8 visits            Subjective: Pt states her pain intensity is about the same but she is feeling stronger in her foot. Did not try nerve glides as she thought it was for her back pain.    Pain: 5-6/10 at worst, 3-4/10 on average      Objective:  - (+) slump test  - Lumbar screen: pain with lumbar AROM extension  - PAIVM assessment: severe pain and hypomobility with CPA at L3-4, L4-5, UPA at right L4-5, L3-4, no leg pain reproduction  - moderate right achilles tendon hypertrophy  - severe TTP at mid portion of right achilles tendon  - dermal myofascial restrictions at achilles tendon, lateral of the tendon vertical adhesions  - ankle PF 4-/5 on right  - ambulation 3/10 pain noted      Assessment: Performed manual therapy at lumbar spine including passive accessory and passive physiological mobilization at hypomobile and painful region and no change in gait pain.  Performed sciatic nerve glide ankle and knee bias and no change in gait pain noted. Dermal myofascial release at achilles did change her ambulation pain to 1/10 post.        Goals: (to be met in 8 visits)  Pt will demonstrate improved DF AROM to >= 10 degrees to promote proper foot clearance during gait and greater ease descending stairs without compensation  Pt will have increased ankle strength to 5/5 throughout for improved ankle control with ADLs such as prolonged gait and stair negotiation   Pt will report <0/10 pain with work and home activities such as prolonged walking   Pt will be independent and compliant with comprehensive HEP to maintain progress achieved in PT     Plan: continue with manual therapy for pain modulation and progressing gastroc/soleus strength with load  management.  Date: 8/13/2024  TX#: 2/8 Date:  8/16/24               TX#: 3/8 Date: 8/20/24                TX#: 4/8 Date:  8/23/24               TX#: 5/8 Date: 8/27/24  Tx#: 6/8   MT (20 min)  - Dermal myofascial release at achilles tendon, vertical  MT (35 min)  - Dermal myofascial release at achilles tendon, vertical, not mid achilles but around where less TTP MT (20 min)  - Dermal myofascial release at achilles tendon, vertical, not mid achilles but around where less TTP MT (20 min)  - Dermal myofascial release at achilles tendon, vertical, not mid achilles but around where less TTP MT (30 min)  - Lumbar PAIVM assessment  - CPA L4-5, grade III-, no change in pain with gait  - UPA L4-5, grade III- on right, no change in pain with gait  - Dermal myofascial release at achilles tendon, vertical adhesions, decreased pain to 1/10 with gait   TE (15 min)  - Eccentric heel raise, 3x5  - Split stance, big toe flexion, red TB, isometric,  TE (10 min)  - stair assessment  - eccentric deficit heel raise, 1x6 TE (23 min)  - BFR training, explanation of benefits and protocol  - 75% occlusion pressure, 30-15-15-15 with resisted ankle PF using rubber ball  - ambulation assessment NMRE (15 min)  - neuroprovocation testing  - Supine sciatic nerve glide, ankle bias, 2x15  - Slump position, sciatic nerve flossing, 1x10, discontinued due to increase in back pain TE (10 min)  - ambulation assessment  - sciatic nerve glide, 2x15 supine, no change in pain with gait   NMRE (10 min)  - Toe yoga, sitting and standing  - Ambulation training with big toe push off, 50ft x 3   TE ( 8 min)  - Eccentric calf raise on right with left foot assist, 2x5           HEP:   - Eccentric heel raise, 3x5  - Toe yoga, sitting and standing  - Split stance, big toe flexion, red TB, isometric,     Charges: MT 2 , TE 1       Total Timed Treatment: 40 min  Total Treatment Time: 40 min    Objective at IE:  Observation: moderate right achilles tendon  hypertrophy  Palpation: severe TTP at mid portion of right achilles tendon  Sensation: diminished L5 dermatome on right     Strength/MMT: (* denotes performed with pain)  Hip Foot/Ankle   Flexion: R 5/5; L 5/5  Extension: R nt/5; L nt/5  Abduction: R nt/5; L nt/5  ER: R nt/5; L nt/5  IR: R nt/5; L nt/5 DF: R 5/5; L 5/5  PF: R 4-/5; L 5/5  INV: R 4/5; L 5/5  EV: R 5/5; L 5/5  Great toe ext: R 4/5; L 5/5      Gait: pt ambulates on level ground with antalgia on right, slight less push off  Balance: SLS: R 10 sec, L 10 sec

## 2024-08-29 ENCOUNTER — OFFICE VISIT (OUTPATIENT)
Dept: PHYSICAL THERAPY | Facility: HOSPITAL | Age: 69
End: 2024-08-29
Attending: NURSE PRACTITIONER
Payer: MEDICARE

## 2024-08-29 DIAGNOSIS — M48.062 SPINAL STENOSIS OF LUMBAR REGION WITH NEUROGENIC CLAUDICATION: Primary | ICD-10-CM

## 2024-08-29 DIAGNOSIS — M47.816 LUMBAR SPONDYLOSIS: ICD-10-CM

## 2024-08-29 PROCEDURE — 97110 THERAPEUTIC EXERCISES: CPT

## 2024-08-29 PROCEDURE — 97162 PT EVAL MOD COMPLEX 30 MIN: CPT

## 2024-08-29 NOTE — PROGRESS NOTES
SPINE EVALUATION:     Diagnosis:   Spinal stenosis of lumbar region with neurogenic claudication (M48.062)  Lumbar spondylosis (M47.816)      Referring Provider: Samara Cui  Date of Evaluation:    8/29/2024    Precautions:   Anxiety, Osteoarthritis Next MD visit:   none scheduled  Date of Surgery: n/a     PATIENT SUMMARY   Pastora Cavazos is a 69 year old female who presents to therapy today with complaints of chronic low back pain    History of Current Condition: patient reports on and off back pain since age of 30's, no specific MARIE. States at this time has tried physical therapy and chiropractors with some relief. Finds that the back is much worse with a lot of activity, like yard work or mowing the grass. Patient also currently undergoing treatment for Achilles Tendinosis, was given sciatic nerve glides and patient reports no change in sx. Feels like if she walks a long time her legs become very heavy.   N/T: Denies down BLE    Pt describes pain level current 1/10, at best 0/10 in recliner, at worst 6/10.   Quality/Location: R low back/hip/gluteals, aching pain  Aggravating: bending down, increased activity   Relieving: sitting, laying down    Current functional limitations include sit<>stand, supine<>sit, walking, stair negotiation, bending down to the ground, yard work, rolling in bed, and squatting.   Home Set Up: Lives 6 months in [place], 6 months in [place]. Stairs at home, has more trouble with descent  Occupation: Retired, volunteers at animal shelter 3x/week    Pastora describes prior level of function independent. Pt goals include return to everyday activities without pain, like mowing the lawn.  Past medical history was reviewed with Pastora. Significant findings include   Past Medical History:    Anxiety    Anxiety state    Disorder of thyroid    HYPOTHYROID    Elbow fracture, left    GERD (gastroesophageal reflux disease)    High cholesterol    Hyperlipidemia    Medial meniscus tear    2012     Neck pain    neck stenosis    OCD (obsessive compulsive disorder)    Osteoarthritis    Pathological dislocation of right upper arm    Sinusitis     Pt denies diplopia, dysarthria, dysphasia, dizziness, drop attacks, bowel/bladder changes, saddle anesthesia, and JOSE LE N/T.    ASSESSMENT  Pastora presents to physical therapy evaluation with primary c/o chronic low back pain. The results of the objective tests and measures show decreased and painful lumbar AROM, mild strength deficits, positive quadrant testing, postural changes, and hypomobility at lower lumbar spine.  Functional deficits include but are not limited to sit<>stand, supine<>sit, walking, stair negotiation, bending down to the ground, yard work, rolling in bed, and squatting.  Signs and symptoms are consistent with diagnosis of referring diagnosis. Pt and PT discussed evaluation findings, pathology, POC and HEP.  Pt voiced understanding and performs HEP correctly without reported pain. Skilled Physical Therapy is medically necessary to address the above impairments and reach functional goals.     OBJECTIVE:   Observation/Posture: presents independently, no AD   Posture: Minimal R trunk lean, depressed R shoulder, decreased lumbar lordosis in static stance  Neuro Screen: Grossly intact to light touch BLE     Lumbar AROM: (* denotes performed with pain)  Flexion: 82*   Extension: 15  Sidebending: R to knee*; L grossly 50%*   Rotation: R WFL; L WFL    Accessory motion: Hypomobility with radicular sx at lower lumbar spine, central P-A and L3-L5 R unilateral PA  Palpation: TTP throughout lumbar spine, lumbar paraspinals     Strength: (* denotes performed with pain)  LE   Hip flexion (L2): R 4/5; L 5/5  Hip abduction: R 4-*/5; L 4*/5  Hip Extension: R 4*/5; L 4+/5   Hip ER: R 4+/5; L 4+/5  Hip IR: R 4+/5; L 4+/5  Knee Flexion: R 5/5; L 5/5   Knee extension (L3): R 5/5; L 5/5   DF (L4): R 5/5; L 5/5       Flexibility:   LE   Hip Flexor: R minimal restriction, L  WFL  Hamstrings: Not Tested  Piriformis: Not Tested     Special tests:   Lumbar Quadrant Testing: + for sx reproduction with R/L extension plane (L>R)   Slump: Not Tested  SLR: Not Tested     Gait: pt ambulates on level ground with  decreased hip extension bilaterally, antalgia, and decreased trunk rotation  .    Today’s Treatment and Response:   Pt education was provided on exam findings, treatment diagnosis, treatment plan, expectations, and prognosis. Pt was also provided recommendations for activity modifications, possible soreness after evaluation, modalities as needed [ice/heat], postural corrections, ergonomics, pain science education , and importance of remaining active  Patient was instructed in and issued a HEP for:   Access Code: [email or web address]  URL: [email or web address]  Date: 08/29/2024  Prepared by: Fawn Ford    Exercises  - Standing Back Extension at Wall  - 1-2 x daily - 7 x weekly - 2 sets - 10 reps - 3 second hold   OR  - Prone Press Up On Elbows  - 1-2 x daily - 7 x weekly - 2 sets - 10 reps - 3 second hold      Charges: PT Eval Moderate Complexity, 1 TE      Total Timed Treatment: 10' min     Total Treatment Time: 45'  min     Based on clinical rationale and outcome measures, this evaluation involved Moderate Complexity decision making due to 1-2 personal factors/comorbidities, 4+ body structures involved/activity limitations, and evolving symptoms including changing pain levels.  PLAN OF CARE:    Goals: (to be met in 10 visits)   Pt will improve transversus abdominis recruitment to perform proper isometric contraction without requiring verbal or tactile cuing to promote advancement of therex   Pt will demonstrate good understanding of proper posture and body mechanics to decrease pain and improve spinal safety   Pt will improve lumbar spine AROM flexion to >85 deg pain free to allow increase ease with bending forward to don shoes   Pt will improve lumbar extension AROM   Pt  will report improved symptom centralization and absence of radicular symptoms for 3 consecutive days to improve function with ADL   Pt will have decreased paraspinal mm tension to tolerate performing supine<>sit transfers.    Pt will demonstrate improved core strength to be able to mow the lawn with <3/10 pain   Pt will be independent and compliant with comprehensive HEP to maintain progress achieved in PT     Frequency / Duration: Patient will be seen for 1-2 x/week or a total of 10 visits over a 90 day period. Treatment will include: Gait training, Manual Therapy, Neuromuscular Re-education, Self-Care Home Management, Therapeutic Activities, Therapeutic Exercise, and Home Exercise Program instruction    Education or treatment limitation: None  Rehab Potential:good    Oswestry Disability Index Score  Score: 32 % (8/28/2024  6:19 PM)      Patient/Family/Caregiver was advised of these findings, precautions, and treatment options and has agreed to actively participate in planning and for this course of care.    Thank you for your referral. Please co-sign or sign and return this letter via fax as soon as possible to [phone]. If you have any questions, please contact me at Dept: [phone]    Sincerely,  Electronically signed by therapist: Fawn Ford, PT    Physician's certification required: Yes  I certify the need for these services furnished under this plan of treatment and while under my care.    X___________________________________________________ Date____________________    Certification From: 8/29/2024  To:11/27/2024

## 2024-08-30 ENCOUNTER — OFFICE VISIT (OUTPATIENT)
Dept: PHYSICAL THERAPY | Age: 69
End: 2024-08-30
Attending: INTERNAL MEDICINE
Payer: MEDICARE

## 2024-08-30 PROCEDURE — 97110 THERAPEUTIC EXERCISES: CPT

## 2024-08-30 PROCEDURE — 97140 MANUAL THERAPY 1/> REGIONS: CPT

## 2024-08-30 NOTE — PROGRESS NOTES
Diagnosis:      Achilles tendinitis, right leg (M76.61)        Referring Provider: Ana Babcock  Date of Evaluation:    8/6/2024    Precautions:  None Next MD visit:   none scheduled  Date of Surgery: n/a    Insurance Primary/Secondary: DAVIDTJULIA [facility] / N/A     # Auth Visits: 8 visits            Subjective: Pain is still a low level 3/10, but gait has improved and strength feels better    Pain: 3/10 on average today      Objective:  - moderate right achilles tendon hypertrophy  - severe TTP at mid portion of right achilles tendon  - dermal myofascial restrictions at achilles tendon, lateral of the tendon vertical adhesions  - ankle PF 4-/5 on right  - ambulation 3/10 pain noted      Assessment: Performed dermal myofascial release at achilles did change her ambulation pain to 1/10 post.  Discussed wearing heel lift while golfing to minimize aggravation and the length of time for tissue adaptation to occur. She verbalized understanding.      Goals: (to be met in 8 visits)  Pt will demonstrate improved DF AROM to >= 10 degrees to promote proper foot clearance during gait and greater ease descending stairs without compensation  Pt will have increased ankle strength to 5/5 throughout for improved ankle control with ADLs such as prolonged gait and stair negotiation   Pt will report <0/10 pain with work and home activities such as prolonged walking   Pt will be independent and compliant with comprehensive HEP to maintain progress achieved in PT     Plan: continue with manual therapy for pain modulation and progressing gastroc/soleus strength with load management.  Date: 8/20/24                TX#: 4/8 Date:  8/23/24               TX#: 5/8 Date: 8/27/24  Tx#: 6/8 Date: 8/30/24  Tx#: 7/8   MT (20 min)  - Dermal myofascial release at achilles tendon, vertical, not mid achilles but around where less TTP MT (20 min)  - Dermal myofascial release at achilles tendon, vertical, not mid achilles but around where less TTP MT (30 min)  -  Lumbar PAIVM assessment  - CPA L4-5, grade III-, no change in pain with gait  - UPA L4-5, grade III- on right, no change in pain with gait  - Dermal myofascial release at achilles tendon, vertical adhesions, decreased pain to 1/10 with gait MT (30 min)  - Dermal myofascial release at achilles tendon, vertical adhesions, decreased pain to 1/10 with gait  - Soft tissue mobilization soleus muscle   TE (23 min)  - BFR training, explanation of benefits and protocol  - 75% occlusion pressure, 30-15-15-15 with resisted ankle PF using rubber ball  - ambulation assessment NMRE (15 min)  - neuroprovocation testing  - Supine sciatic nerve glide, ankle bias, 2x15  - Slump position, sciatic nerve flossing, 1x10, discontinued due to increase in back pain TE (10 min)  - ambulation assessment  - sciatic nerve glide, 2x15 supine, no change in pain with gait TE (10 min)  - Ambulation assessment  - isometric ankle PF, 10s hold x 10    TE ( 8 min)  - Eccentric calf raise on right with left foot assist, 2x5           HEP:   - Eccentric heel raise, 3x5  - Toe yoga, sitting and standing  - Split stance, big toe flexion, red TB, isometric,     Charges: MT 2 , TE 1       Total Timed Treatment: 40 min  Total Treatment Time: 40 min    Objective at IE:  Observation: moderate right achilles tendon hypertrophy  Palpation: severe TTP at mid portion of right achilles tendon  Sensation: diminished L5 dermatome on right     Strength/MMT: (* denotes performed with pain)  Hip Foot/Ankle   Flexion: R 5/5; L 5/5  Extension: R nt/5; L nt/5  Abduction: R nt/5; L nt/5  ER: R nt/5; L nt/5  IR: R nt/5; L nt/5 DF: R 5/5; L 5/5  PF: R 4-/5; L 5/5  INV: R 4/5; L 5/5  EV: R 5/5; L 5/5  Great toe ext: R 4/5; L 5/5      Gait: pt ambulates on level ground with antalgia on right, slight less push off  Balance: SLS: R 10 sec, L 10 sec

## 2024-09-03 ENCOUNTER — OFFICE VISIT (OUTPATIENT)
Dept: PHYSICAL THERAPY | Age: 69
End: 2024-09-03
Attending: INTERNAL MEDICINE
Payer: MEDICARE

## 2024-09-03 PROCEDURE — 97110 THERAPEUTIC EXERCISES: CPT

## 2024-09-03 PROCEDURE — 97140 MANUAL THERAPY 1/> REGIONS: CPT

## 2024-09-03 NOTE — PROGRESS NOTES
Diagnosis:      Achilles tendinitis, right leg (M76.61)        Referring Provider: Ana Babcock  Date of Evaluation:    8/6/2024    Precautions:  None Next MD visit:   none scheduled  Date of Surgery: n/a    Insurance Primary/Secondary: AETJULIA [facility] / N/A     # Auth Visits: 8 visits            Subjective: woke up with no pain this morning which is the first time in a while. Feeling like there is progress    Assessment: Performed dermal myofascial release at achilles and it changed her ambulation pain to 1/10 post.  Pt encouraged with progress with ability to perform heel lift with less pain and waking up this morning with no pain. Continued to encourage activity modification and load management and she builds strength.      Pain: 3/10 on average today due to walking dog      Objective:  - able to perform eccentric heel lower with no pain today  - moderate right achilles tendon hypertrophy  - severe TTP at mid portion of right achilles tendon  - dermal myofascial restrictions at achilles tendon, lateral of the tendon vertical adhesions  - ambulation 3/10 pain noted      Goals: (to be met in 8 visits)  Pt will demonstrate improved DF AROM to >= 10 degrees to promote proper foot clearance during gait and greater ease descending stairs without compensation - MET  Pt will have increased ankle strength to 5/5 throughout for improved ankle control with ADLs such as prolonged gait and stair negotiation - Progressing  Pt will report <0/10 pain with work and home activities such as prolonged walking - Progressing  Pt will be independent and compliant with comprehensive HEP to maintain progress achieved in PT - Progressing      Plan: Continue skilled Physical Therapy 1 x/week or a total of 4-6 visits over a 90 day period. Treatment will include: manual therapy, therapeutic exercise, therapeutic activity       Patient/Family/Caregiver was advised of these findings, precautions, and treatment options and has agreed to actively  participate in planning and for this course of care.    Thank you for your referral. If you have any questions, please contact me at Dept: [phone].    Sincerely,  Electronically signed by therapist: Tripp Robbins PT     Physician's certification required:  Yes  Please co-sign or sign and return this letter via fax as soon as possible to [phone].   I certify the need for these services furnished under this plan of treatment and while under my care.    X___________________________________________________ Date____________________    Certification From: 9/3/2024  To:12/2/2024    Date: 8/20/24                TX#: 4/8 Date:  8/23/24               TX#: 5/8 Date: 8/27/24  Tx#: 6/8 Date: 8/30/24  Tx#: 7/8 Date: 9/3/24  Tx#: 8/8   MT (20 min)  - Dermal myofascial release at achilles tendon, vertical, not mid achilles but around where less TTP MT (20 min)  - Dermal myofascial release at achilles tendon, vertical, not mid achilles but around where less TTP MT (30 min)  - Lumbar PAIVM assessment  - CPA L4-5, grade III-, no change in pain with gait  - UPA L4-5, grade III- on right, no change in pain with gait  - Dermal myofascial release at achilles tendon, vertical adhesions, decreased pain to 1/10 with gait MT (30 min)  - Dermal myofascial release at achilles tendon, vertical adhesions, decreased pain to 1/10 with gait  - Soft tissue mobilization soleus muscle MT (30 min)  - Dermal myofascial release at achilles tendon, vertical adhesions, distal attachment focused decreased pain to 1/10 with gait  - Soft tissue mobilization soleus muscle, gastroc muscle   TE (23 min)  - BFR training, explanation of benefits and protocol  - 75% occlusion pressure, 30-15-15-15 with resisted ankle PF using rubber ball  - ambulation assessment NMRE (15 min)  - neuroprovocation testing  - Supine sciatic nerve glide, ankle bias, 2x15  - Slump position, sciatic nerve flossing, 1x10, discontinued due to increase in back pain TE (10 min)  -  ambulation assessment  - sciatic nerve glide, 2x15 supine, no change in pain with gait TE (10 min)  - Ambulation assessment  - isometric ankle PF, 10s hold x 10 TE (8 min)  - Ambulation assessment  - heel raise assessment    TE ( 8 min)  - Eccentric calf raise on right with left foot assist, 2x5             HEP:   - Eccentric heel raise, 3x5  - Toe yoga, sitting and standing  - Split stance, big toe flexion, red TB, isometric,     Charges: MT 2 , TE 1       Total Timed Treatment: 40 min  Total Treatment Time: 40 min    Objective at IE:  Observation: moderate right achilles tendon hypertrophy  Palpation: severe TTP at mid portion of right achilles tendon  Sensation: diminished L5 dermatome on right     Strength/MMT: (* denotes performed with pain)  Hip Foot/Ankle   Flexion: R 5/5; L 5/5  Extension: R nt/5; L nt/5  Abduction: R nt/5; L nt/5  ER: R nt/5; L nt/5  IR: R nt/5; L nt/5 DF: R 5/5; L 5/5  PF: R 4-/5; L 5/5  INV: R 4/5; L 5/5  EV: R 5/5; L 5/5  Great toe ext: R 4/5; L 5/5      Gait: pt ambulates on level ground with antalgia on right, slight less push off  Balance: SLS: R 10 sec, L 10 sec

## 2024-09-06 ENCOUNTER — OFFICE VISIT (OUTPATIENT)
Dept: PHYSICAL THERAPY | Facility: HOSPITAL | Age: 69
End: 2024-09-06
Attending: NURSE PRACTITIONER
Payer: MEDICARE

## 2024-09-06 ENCOUNTER — PATIENT MESSAGE (OUTPATIENT)
Dept: INTERNAL MEDICINE CLINIC | Facility: CLINIC | Age: 69
End: 2024-09-06

## 2024-09-06 PROCEDURE — 97140 MANUAL THERAPY 1/> REGIONS: CPT

## 2024-09-06 PROCEDURE — 97110 THERAPEUTIC EXERCISES: CPT

## 2024-09-06 NOTE — PROGRESS NOTES
Diagnosis:     Spinal stenosis of lumbar region with neurogenic claudication (M48.062)  Lumbar spondylosis (M47.816)      Referring Provider: Samara Cui  Date of Evaluation:    8/30/2024     Precautions:   Anxiety, Osteoarthritis Next MD visit:   none scheduled  Date of Surgery: n/a   Insurance Primary/Secondary: AETNA M[facility] / N/A     # Auth Visits: no auth, 10 per POC            Subjective: Reports her L heel has been feeling much better, feels that has helped a lot. Back has been feeling okay, but was flared up after evaluation last week. Did yard work over the weekend including picking up leaves and found that to aggravate her back requiring a rest break.     Pain: 2-3/10 current      Objective:   Lumbar AROM: (* denotes performed with pain)  Flexion: 82*   Extension: 15  Sidebending: R to knee*; L grossly 50%*   Rotation: R WFL; L WFL     Accessory motion: Hypomobility with radicular sx at lower lumbar spine, central P-A and L3-L5 R unilateral PA  Palpation: TTP throughout lumbar spine, lumbar paraspinals      Strength: (* denotes performed with pain)  LE   Hip flexion (L2): R 4/5; L 5/5  Hip abduction: R 4-*/5; L 4*/5  Hip Extension: R 4*/5; L 4+/5            Hip ER: R 4+/5; L 4+/5  Hip IR: R 4+/5; L 4+/5  Knee Flexion: R 5/5; L 5/5            Knee extension (L3): R 5/5; L 5/5            DF (L4): R 5/5; L 5/5         Flexibility:   LE   Hip Flexor: R minimal restriction, L WFL  Hamstrings: Not Tested  Piriformis: Not Tested      Special tests:   Lumbar Quadrant Testing: + for sx reproduction with R/L extension plane (L>R)   Slump: Not Tested  SLR: Not Tested      Gait: pt ambulates on level ground with  decreased hip extension bilaterally, antalgia, and decreased trunk rotation  .      Assessment: Notable soft tissue restriction in lumbar and lower thoracic paraspinal, improves with manual therapy leading to decreased pain with supine<>sit. Reporting sx improvement end of session after open book stretching.        Goals: (to be met in 10 visits)   Pt will improve transversus abdominis recruitment to perform proper isometric contraction without requiring verbal or tactile cuing to promote advancement of therex   Pt will demonstrate good understanding of proper posture and body mechanics to decrease pain and improve spinal safety   Pt will improve lumbar spine AROM flexion to >85 deg pain free to allow increase ease with bending forward to don shoes   Pt will improve lumbar extension AROM   Pt will report improved symptom centralization and absence of radicular symptoms for 3 consecutive days to improve function with ADL   Pt will have decreased paraspinal mm tension to tolerate performing supine<>sit transfers.    Pt will demonstrate improved core strength to be able to mow the lawn with <3/10 pain   Pt will be independent and compliant with comprehensive HEP to maintain progress achieved in PT    Oswestry Disability Index Score  Score: 32 % (8/28/2024  6:19 PM)    Plan: Patient will be seen for 1-2 x/week or a total of 10 visits over a 90 day period. Treatment will include: Gait training, Manual Therapy, Neuromuscular Re-education, Self-Care Home Management, Therapeutic Activities, Therapeutic Exercise, and Home Exercise Program instruction   Future Session Consider: thoracic extension, modified teofilo falcon  Date: 9/6/2024  TX#: 2/10 Date:                 TX#: 3/ Date:                 TX#: 4/ Date:                 TX#: 5/ Date:   Tx#: 6/   Manual: 12'   STM lumbar/ low thoracic paraspinals (R>L)       TE 30'   Seated SB roll, lumbar flexion/R/L x10  Prone TKE on blue Ball x15ea R/L   H/L PPT with tactile cue, x20  H/L march with PPT x10 ea  SB DKTC x20  S/L open book, R/L x15 ea                       HEP: Access Code: [email or web address]  URL: [email or web address]  Date: 09/06/2024  Prepared by: Fawn Ford    Exercises  - Standing Back Extension at Wall  - 1-2 x daily - 7 x weekly - 2 sets - 10  reps - 3 second hold  - Prone Press Up On Elbows  - 1-2 x daily - 7 x weekly - 2 sets - 10 reps - 3 second hold  - Sidelying Thoracic Rotation with Open Book  - 1 x daily - 7 x weekly - 2 sets - 10 reps - 5 second  hold  - Seated Flexion Stretch with Swiss Ball  - 1 x daily - 7 x weekly - 2 sets - 10 reps    Charges: 1 Manual, 2 TE       Total Timed Treatment: 42' min  Total Treatment Time: 42' min

## 2024-09-08 NOTE — TELEPHONE ENCOUNTER
From: Pastora Cavazos  To: Ana Babcock  Sent: 9/6/2024 6:26 PM CDT  Subject: Colonoscopy     Hi Dr. Babcock,  My endocrinologist was not listed as a doctor to contact. Maybe you could refer this to him. For my procedure on Tuesday, is it ok to drink diet ginger ale and diet Gatorade?  Thanks, Pastora Cavazos

## 2024-09-09 ENCOUNTER — APPOINTMENT (OUTPATIENT)
Dept: PHYSICAL THERAPY | Facility: HOSPITAL | Age: 69
End: 2024-09-09
Attending: NURSE PRACTITIONER
Payer: MEDICARE

## 2024-09-09 ENCOUNTER — TELEPHONE (OUTPATIENT)
Facility: CLINIC | Age: 69
End: 2024-09-09

## 2024-09-09 ENCOUNTER — TELEPHONE (OUTPATIENT)
Dept: PHYSICAL THERAPY | Facility: HOSPITAL | Age: 69
End: 2024-09-09

## 2024-09-09 NOTE — TELEPHONE ENCOUNTER
Patient has a question regardimg taking medication prior to tomorrow's colonoscopy.  Please call.  Thank you.

## 2024-09-09 NOTE — TELEPHONE ENCOUNTER
I called the patient back.  She spoke with nurse at endoscopy who already answered her question.  Nothing further needed so encounter closed.

## 2024-09-10 ENCOUNTER — HOSPITAL ENCOUNTER (OUTPATIENT)
Age: 69
Setting detail: HOSPITAL OUTPATIENT SURGERY
Discharge: HOME | End: 2024-09-10
Attending: INTERNAL MEDICINE | Admitting: INTERNAL MEDICINE
Payer: MEDICARE

## 2024-09-10 ENCOUNTER — ANESTHESIA (OUTPATIENT)
Dept: ENDOSCOPY | Age: 69
End: 2024-09-10
Payer: MEDICARE

## 2024-09-10 ENCOUNTER — ANESTHESIA EVENT (OUTPATIENT)
Dept: ENDOSCOPY | Age: 69
End: 2024-09-10
Payer: MEDICARE

## 2024-09-10 VITALS
RESPIRATION RATE: 14 BRPM | WEIGHT: 160 LBS | DIASTOLIC BLOOD PRESSURE: 81 MMHG | HEIGHT: 66 IN | SYSTOLIC BLOOD PRESSURE: 108 MMHG | HEART RATE: 64 BPM | BODY MASS INDEX: 25.71 KG/M2 | OXYGEN SATURATION: 96 %

## 2024-09-10 DIAGNOSIS — Z86.010 HISTORY OF COLON POLYPS: ICD-10-CM

## 2024-09-10 DIAGNOSIS — Z12.11 COLON CANCER SCREENING: ICD-10-CM

## 2024-09-10 PROCEDURE — 45385 COLONOSCOPY W/LESION REMOVAL: CPT | Performed by: INTERNAL MEDICINE

## 2024-09-10 PROCEDURE — 45380 COLONOSCOPY AND BIOPSY: CPT | Performed by: INTERNAL MEDICINE

## 2024-09-10 PROCEDURE — 99070 SPECIAL SUPPLIES PHYS/QHP: CPT | Performed by: INTERNAL MEDICINE

## 2024-09-10 PROCEDURE — 88305 TISSUE EXAM BY PATHOLOGIST: CPT | Performed by: INTERNAL MEDICINE

## 2024-09-10 DEVICE — REPLAY HEMOSTASIS CLIP, 11MM SPAN
Type: IMPLANTABLE DEVICE | Status: FUNCTIONAL
Brand: REPLAY

## 2024-09-10 RX ORDER — SODIUM CHLORIDE, SODIUM LACTATE, POTASSIUM CHLORIDE, CALCIUM CHLORIDE 600; 310; 30; 20 MG/100ML; MG/100ML; MG/100ML; MG/100ML
INJECTION, SOLUTION INTRAVENOUS CONTINUOUS
Status: DISCONTINUED | OUTPATIENT
Start: 2024-09-10 | End: 2024-09-10

## 2024-09-10 RX ORDER — LIDOCAINE HYDROCHLORIDE 10 MG/ML
INJECTION, SOLUTION EPIDURAL; INFILTRATION; INTRACAUDAL; PERINEURAL AS NEEDED
Status: DISCONTINUED | OUTPATIENT
Start: 2024-09-10 | End: 2024-09-10 | Stop reason: SURG

## 2024-09-10 RX ORDER — NALOXONE HYDROCHLORIDE 0.4 MG/ML
0.08 INJECTION, SOLUTION INTRAMUSCULAR; INTRAVENOUS; SUBCUTANEOUS ONCE AS NEEDED
Status: DISCONTINUED | OUTPATIENT
Start: 2024-09-10 | End: 2024-09-10

## 2024-09-10 RX ADMIN — SODIUM CHLORIDE, SODIUM LACTATE, POTASSIUM CHLORIDE, CALCIUM CHLORIDE: 600; 310; 30; 20 INJECTION, SOLUTION INTRAVENOUS at 10:55:00

## 2024-09-10 RX ADMIN — LIDOCAINE HYDROCHLORIDE 40 MG: 10 INJECTION, SOLUTION EPIDURAL; INFILTRATION; INTRACAUDAL; PERINEURAL at 10:51:00

## 2024-09-10 RX ADMIN — SODIUM CHLORIDE, SODIUM LACTATE, POTASSIUM CHLORIDE, CALCIUM CHLORIDE: 600; 310; 30; 20 INJECTION, SOLUTION INTRAVENOUS at 10:50:00

## 2024-09-10 RX ADMIN — SODIUM CHLORIDE, SODIUM LACTATE, POTASSIUM CHLORIDE, CALCIUM CHLORIDE: 600; 310; 30; 20 INJECTION, SOLUTION INTRAVENOUS at 11:11:00

## 2024-09-10 NOTE — ANESTHESIA PREPROCEDURE EVALUATION
Anesthesia PreOp Note    HPI:     Pastora Cavazos is a 69 year old female who presents for preoperative consultation requested by: Rudi Reinoso MD    Date of Surgery: 9/10/2024    Procedure(s):  COLONOSCOPY  Indication: Colon cancer screening /  History of colon polyps    Relevant Problems   No relevant active problems       NPO:  Last Liquid Consumption Date: 09/10/24  Last Liquid Consumption Time: 0700  Last Solid Consumption Date: 09/08/24  Last Solid Consumption Time: 2000  Last Liquid Consumption Date: 09/10/24          History Review:  Patient Active Problem List    Diagnosis Date Noted    Word finding difficulty 06/18/2023    Family history of Alzheimer's disease 06/18/2023    Vitamin D deficiency 06/18/2023    Mild recurrent major depression (HCC) 05/09/2022    Borderline diabetes 05/09/2022    History of bilateral knee replacement 05/02/2018    Primary osteoarthritis of left knee 07/31/2017    Primary osteoarthritis of right knee 03/20/2017    Cervical radiculitis 01/26/2016    Hyperlipidemia 11/04/2014       Past Medical History:    Anxiety    Anxiety state    Disorder of thyroid    HYPOTHYROID    Elbow fracture, left    GERD (gastroesophageal reflux disease)    High cholesterol    Hyperlipidemia    Medial meniscus tear    2012    Neck pain    neck stenosis    OCD (obsessive compulsive disorder)    Osteoarthritis    Pathological dislocation of right upper arm    Sinusitis       Past Surgical History:   Procedure Laterality Date    Arthroscopy of joint unlisted Left     knee    Arthroscopy of joint unlisted Right     knee    Colonoscopy N/A 8/25/2021    Procedure: COLONOSCOPY;  Surgeon: Rudi Reinoso MD;  Location: [facility]    Correct bunion,othr methods Right     Foot surgery Right 1986    Bunionectomy Bone spur R Foot    Fracture surgery Left 2015    elbow radial head replacement    Knee replacement surgery Bilateral 2017    Other surgical history Left     Head of the radius    Total knee  replacement Right 3/20/17    DR. PATRICK    Total knee replacement Left 07/31/2017    DR. PATRICK       Medications Prior to Admission   Medication Sig Dispense Refill Last Dose    atorvastatin 40 MG Oral Tab Take 1 tablet (40 mg total) by mouth daily. 90 tablet 3 9/9/2024    QUEtiapine Fumarate (SEROQUEL) 25 MG Oral Tab Take 1 tablet (25 mg total) by mouth nightly.  0 9/9/2024    Escitalopram Oxalate (LEXAPRO) 20 MG Oral Tab Take 0.5 tablets (10 mg total) by mouth daily. Given by psych  0 9/9/2024    moxifloxacin 0.5 % Ophthalmic Solution POST-OP: 1 drop in surgery eye QID for one week after surgery. Continue as directed. (Patient not taking: Reported on 8/12/2024)       prednisoLONE 1 % Ophthalmic Suspension POST-OP: 1 drop in surgery eye after surgery QID x 1 week, TID x 1 week, BID x 1 week, QD x 1 week (Patient not taking: Reported on 8/12/2024)       triamcinolone 0.1 % External Ointment Apply 1 Application  topically 2 (two) times daily as needed. (Patient not taking: Reported on 8/12/2024) 15 g 3     fluticasone propionate 50 MCG/ACT Nasal Suspension 2 sprays by Nasal route as needed. (Patient not taking: Reported on 8/12/2024)        Current Facility-Administered Medications Ordered in Epic   Medication Dose Route Frequency Provider Last Rate Last Admin    lactated ringers infusion   Intravenous Continuous Rudi Reinoso MD         No current [facility]-ordered outpatient medications on file.       Allergies   Allergen Reactions    Penicillin G HIVES    Penicillins HIVES       Family History   Problem Relation Age of Onset    Other (Myocardial Infarction[other]) Brother     Psychiatric Father         alzheimer's    Heart Disorder Mother     Cancer Mother         lung    Other (Alzheimer's Disease[other]) Other         Family H    Heart Disease Other         Family H/O     Social History     Socioeconomic History    Marital status: Life Partner   Tobacco Use    Smoking status: Never     Passive exposure: Never     Smokeless tobacco: Never   Vaping Use    Vaping status: Never Used   Substance and Sexual Activity    Alcohol use: Yes     Alcohol/week: 4.0 standard drinks of alcohol     Types: 4 Glasses of wine per week     Comment: social    Drug use: No    Sexual activity: Not Currently     Partners: Male   Other Topics Concern    Caffeine Concern Yes     Comment: 1 Cup Soda Daily    Exercise No       Available pre-op labs reviewed.  Lab Results   Component Value Date    WBC 4.7 07/10/2024    RBC 4.17 07/10/2024    HGB 13.7 07/10/2024    HCT 39.2 07/10/2024    MCV 94.0 07/10/2024    MCH 32.9 07/10/2024    MCHC 34.9 07/10/2024    RDW 11.9 07/10/2024    .0 07/10/2024     Lab Results   Component Value Date     07/10/2024    K 5.0 07/10/2024     07/10/2024    CO2 29.0 07/10/2024    BUN 11 07/10/2024    CREATSERUM 0.90 07/10/2024     (H) 07/10/2024    CA 9.8 07/10/2024          Vital Signs:  Body mass index is 25.82 kg/m².   height is 1.676 m (5' 6\") and weight is 72.6 kg (160 lb). Her blood pressure is 108/87 and her pulse is 70. Her respiration is 16 and oxygen saturation is 96%.   Vitals:    08/30/24 1520 09/10/24 1032   BP:  108/87   Pulse:  70   Resp:  16   SpO2:  96%   Weight: 72.6 kg (160 lb)    Height: 1.676 m (5' 6\")         Anesthesia Evaluation      Airway   Mallampati: I  TM distance: >3 FB  Neck ROM: full  Dental          Pulmonary - negative ROS and normal exam   Cardiovascular - negative ROS and normal exam    Neuro/Psych      GI/Hepatic/Renal      Endo/Other - negative ROS   Abdominal                  Anesthesia Plan:   ASA:  2  Plan:   MAC  Plan Comments: I have discussed the anesthetic plan, major risks and alternatives with the patient and answered all questions. The patient desires to proceed with surgery and anesthesia as planned.     Informed Consent Plan and Risks Discussed With:  Patient      I have informed Pastora Cavazos and/or legal guardian or family member of the nature of  the anesthetic plan, benefits, risks including possible dental damage if relevant, major complications, and any alternative forms of anesthetic management.   All of the patient's questions were answered to the best of my ability. The patient desires the anesthetic management as planned.  JOSHUA TALAVERA DO  9/10/2024 10:42 AM  Present on Admission:  **None**

## 2024-09-10 NOTE — H&P
History & Physical Examination    Patient Name: Pastora Cavazos  MRN: [id number]  CSN: [id number]  YOB: 1955    Diagnosis:   CRC screening  Hx colon polyps       Medications Prior to Admission   Medication Sig Dispense Refill Last Dose    atorvastatin 40 MG Oral Tab Take 1 tablet (40 mg total) by mouth daily. 90 tablet 3 9/9/2024    QUEtiapine Fumarate (SEROQUEL) 25 MG Oral Tab Take 1 tablet (25 mg total) by mouth nightly.  0 9/9/2024    Escitalopram Oxalate (LEXAPRO) 20 MG Oral Tab Take 0.5 tablets (10 mg total) by mouth daily. Given by psych  0 9/9/2024    moxifloxacin 0.5 % Ophthalmic Solution POST-OP: 1 drop in surgery eye QID for one week after surgery. Continue as directed. (Patient not taking: Reported on 8/12/2024)       prednisoLONE 1 % Ophthalmic Suspension POST-OP: 1 drop in surgery eye after surgery QID x 1 week, TID x 1 week, BID x 1 week, QD x 1 week (Patient not taking: Reported on 8/12/2024)       triamcinolone 0.1 % External Ointment Apply 1 Application  topically 2 (two) times daily as needed. (Patient not taking: Reported on 8/12/2024) 15 g 3     fluticasone propionate 50 MCG/ACT Nasal Suspension 2 sprays by Nasal route as needed. (Patient not taking: Reported on 8/12/2024)        Current Facility-Administered Medications   Medication Dose Route Frequency    lactated ringers infusion   Intravenous Continuous       Allergies:   Allergies   Allergen Reactions    Penicillin G HIVES    Penicillins HIVES       Past Medical History:    Anxiety    Anxiety state    Disorder of thyroid    HYPOTHYROID    Elbow fracture, left    GERD (gastroesophageal reflux disease)    High cholesterol    Hyperlipidemia    Medial meniscus tear    2012    Neck pain    neck stenosis    OCD (obsessive compulsive disorder)    Osteoarthritis    Pathological dislocation of right upper arm    Sinusitis     Past Surgical History:   Procedure Laterality Date    Arthroscopy of joint unlisted Left     knee    Arthroscopy  of joint unlisted Right     knee    Colonoscopy N/A 8/25/2021    Procedure: COLONOSCOPY;  Surgeon: Rudi Reinoso MD;  Location: [facility] ENDO    Correct bunion,othr methods Right     Foot surgery Right 1986    Bunionectomy Bone spur R Foot    Fracture surgery Left 2015    elbow radial head replacement    Knee replacement surgery Bilateral 2017    Other surgical history Left     Head of the radius    Total knee replacement Right 3/20/17    DR. PATRICK    Total knee replacement Left 07/31/2017    DR. PATRICK     Family History   Problem Relation Age of Onset    Other (Myocardial Infarction[other]) Brother     Psychiatric Father         alzheimer's    Heart Disorder Mother     Cancer Mother         lung    Other (Alzheimer's Disease[other]) Other         Family H    Heart Disease Other         Family H/O     Social History     Tobacco Use    Smoking status: Never     Passive exposure: Never    Smokeless tobacco: Never   Substance Use Topics    Alcohol use: Yes     Alcohol/week: 4.0 standard drinks of alcohol     Types: 4 Glasses of wine per week     Comment: social       SYSTEM Check if Review is Normal Check if Physical Exam is Normal If not normal, please explain:   HEENT [x ] [ x]    NECK & BACK [x ] [x ]    HEART [x ] [ x]    LUNGS [x ] [ x]    ABDOMEN [x ] [x ]    UROGENITAL [ ] [ ]    EXTREMITIES [x ] [x ]    OTHER        [ x ] I have discussed the risks and benefits and alternatives with the patient/family.  They understand and agree to proceed with plan of care.  [ x ] I have reviewed the History and Physical done within the last 30 days.  Any changes noted above.    Rudi Reinoso MD  9/10/2024  10:40 AM

## 2024-09-10 NOTE — OPERATIVE REPORT
[facility] Endoscopy Report  Date of procedure hide-September 10, 2024    Preoperative Diagnosis:  -Colorectal cancer screening  -History of colon polyps      Postoperative Diagnosis:  -Colon polyps x 5  -Internal hemorrhoids      Procedure:    Colonoscopy       Surgeon:  Rudi Reinoso M.D.    Anesthesia:  MAC     Technique:  After informed consent, the patient was placed in the left lateral recumbent position.  Digital rectal examination revealed no palpable intraluminal abnormalities.  An Olympus variable stiffness 190 series HD colonoscope was inserted into the rectum and advanced under direct vision by following the lumen to the cecum.  The colon was examined upon withdrawal in the left lateral position.    The procedure was well tolerated without immediate complication.      Findings:  The preparation of the colon was good.  The terminal ileum was examined for 2 cm and visually normal.  The ileocecal valve was well preserved. The visualized colonic mucosa from the cecum to the anal verge was normal with an intact vascular pattern.    Colon polyps x 5 removed as follows;  -Descending x 2, the first polyp was sessile in the segment, and removed by piecemeal cold snare, 2 endoclips were placed across the polypectomy site.  The second polyp in the segment was sessile 4 mm in size and cold snare removed.                    -Sigmoid x 1, sessile 4 mm in size and cold snare removed.  -Rectum x 2, each polyp was diminutive and removed by cold forceps technique.  All polypectomy sites inspected and found to be free bleeding and specimens retrieved and sent for analysis.    Small small internal hemorrhoids noted on retroflexed view.      Estimated blood loss-insignificant  Specimens see above    Impression:  -Colon polyps x 5  -Internal hemorrhoids    Recommendations:  - Post polypectomy instructions given  - Repeat colonoscopy in 3 years  - Symptomatic treatment of hemorrhoids          Rudi Reinoso,  MD  9/10/2024  11:29 AM

## 2024-09-10 NOTE — ANESTHESIA POSTPROCEDURE EVALUATION
Patient: Pastora Cavazos    Procedure Summary       Date: 09/10/24 Room / Location: [facility] ENDOSCOPY 02 / Count includes the [facility] ENDO    Anesthesia Start: 1050 Anesthesia Stop: 1128    Procedure: COLONOSCOPY Diagnosis:       Colon cancer screening      History of colon polyps      (polyps and hemorrhoids)    Surgeons: Rudi Reinoso MD Anesthesiologist: Joshua Tom DO    Anesthesia Type: MAC ASA Status: 2            Anesthesia Type: MAC    Vitals Value Taken Time   BP 82/66 09/10/24 1134   Temp  09/10/24 1134   Pulse 71 09/10/24 1134   Resp 15 09/10/24 1134   SpO2 96 % 09/10/24 1134   Vitals shown include unfiled device data.    [facility] AN Post Evaluation:   Patient Evaluated in PACU  Patient Participation: complete - patient participated  Level of Consciousness: awake  Pain Management: adequate  Airway Patency:patent  Dental exam unchanged from preop  Yes    Nausea/Vomiting: none  Cardiovascular Status: acceptable  Respiratory Status: acceptable and room air  Postoperative Hydration acceptable  Comments: Increased fluids      JOSHUA TOM DO  9/10/2024 11:34 AM

## 2024-09-10 NOTE — DISCHARGE INSTRUCTIONS
Home Care Instructions for Colonoscopy with Sedation    Diet:  - Resume your regular diet as tolerated unless otherwise instructed.  - Start with light meals to minimize bloating.  - Do not drink alcohol today.    Medication:  - If you have questions about resuming your normal medications, please contact your Primary Care Physician.    Activities:  - Take it easy today. Do not return to work today.  - Do not drive today.  - Do not operate any machinery today (including kitchen equipment).    Colonoscopy:  - You may notice some rectal \"spotting\" (a little blood on the toilet tissue) for a day or two after the exam. This is normal.  - If you experience any rectal bleeding (not spotting), persistent tenderness or sharp severe abdominal pains, oral temperature over 100 degrees Fahrenheit, light-headedness or dizziness, or any other problems, contact your doctor.      **If unable to reach your doctor, please go to the [facility] Emergency Room**    - Your referring physician will receive a full report of your examination.  - If you do not hear from your doctor's office within two weeks of your biopsy, please call them for your results.    You may be able to see your laboratory results in [facility] between 4 and 7 business days.  In some cases, your physician may not have viewed the results before they are released to [facility].  If you have questions regarding your results contact the physician who ordered the test/exam by phone or via [facility] by choosing \"Ask a Medical Question.\"

## 2024-09-11 ENCOUNTER — OFFICE VISIT (OUTPATIENT)
Facility: CLINIC | Age: 69
End: 2024-09-11

## 2024-09-11 ENCOUNTER — TELEPHONE (OUTPATIENT)
Facility: CLINIC | Age: 69
End: 2024-09-11

## 2024-09-11 VITALS
DIASTOLIC BLOOD PRESSURE: 86 MMHG | BODY MASS INDEX: 25.71 KG/M2 | HEIGHT: 66 IN | SYSTOLIC BLOOD PRESSURE: 145 MMHG | WEIGHT: 160 LBS | HEART RATE: 69 BPM

## 2024-09-11 DIAGNOSIS — R11.0 NAUSEA: Primary | ICD-10-CM

## 2024-09-11 DIAGNOSIS — R14.0 BLOATING: ICD-10-CM

## 2024-09-11 PROCEDURE — 99213 OFFICE O/P EST LOW 20 MIN: CPT | Performed by: INTERNAL MEDICINE

## 2024-09-11 RX ORDER — ONDANSETRON 4 MG/1
4 TABLET, FILM COATED ORAL EVERY 8 HOURS PRN
Qty: 20 TABLET | Refills: 0 | Status: SHIPPED | OUTPATIENT
Start: 2024-09-11

## 2024-09-11 NOTE — TELEPHONE ENCOUNTER
Dr. Reinoso    Patient reports having some nausea, she was calm and in no apparent distress on the phone.  She has not vomited, she does not feel urge to vomit.  However, feels nauseated more today than last night.  She has been able to drink today and keep fluids down.  Last time solid food was 30 minutes ago which was some cereal which also stayed down.  Denies any abdominal pain-just a \"queasiness\"  She took some gas-x didn't help.    She feels like maybe she ate too much for dinner last night  Had 2 pieces of chicken and a sweet potato along with a diet coke.    I advised to stay hydrated with water or gatorade and to stick with bland foods for today like toast, crackers, bananas, apple sauce, lean protein like chicken/turkey/fish.    I reviewed symptoms that would prompt ER evaluation.    She would like your input     Please advise.    Thank you

## 2024-09-11 NOTE — TELEPHONE ENCOUNTER
Pt contacted -she has had some nausea since her procedure yesterday.  She did eat chicken with the skin on it for dinner last evening.  No vomiting, no fevers, no chills, no overt abdominal pain or cramping but she does feel bloated.  She describes her abdomen is as a queasiness sensation.  No bowel movement, no blood per rectum or melena.    Plan-unclear whether this be some kind of post anesthesia reaction, possibly a mild ileus but she did have multiple polyps removed and would like to examine the patient today in the office.  If there is focal tenderness and I would advise a CT scan to rule out significant intra-abdominal pathology.  She is agreeable to this plan.    Nursing staff to please add the patient to my office schedule today at either 3 or 4 PM overbook.  The patient is aware that you will be calling.

## 2024-09-11 NOTE — TELEPHONE ENCOUNTER
Patient states that she had a colonoscopy procedure done yesterday and is concerned about having nausea last night and having more nausea today. I tried to reach the nurse but not available.

## 2024-09-11 NOTE — TELEPHONE ENCOUNTER
Patient contacted.  Accepted below appointment.  Given detailed office directions.    Your Appointments      Wednesday September 11, 2024 3:00 PM  Follow Up Visit with Rudi Reinoso MD  [facility], [facility] ([facility]) [address]  [facility] [address]  [phone]

## 2024-09-11 NOTE — PROGRESS NOTES
Pastora Cavazos is a 69 year old female.    HPI:     The patient is a 69-year-old female who has a history of reflux, hyperlipidemia, who follows up in the office today after recent colonoscopy with polypectomy.    She called today complaining of some nausea and queasiness in the upper abdomen.  She denies any abdominal pain, no vomiting.  She did well after procedure.  In fact went home had a couple coffee and noted the onset of some nausea after that.  She had chicken last evening with the skin on and feels this may have exacerbated some of her symptoms.  She denies any blood per rectum and in fact has not had a bowel movement yet.    The patient does not have any overt abdominal pain.    Results of recent colonoscopy were discussed.    HISTORY:  Past Medical History:    Anxiety    Anxiety state    Disorder of thyroid    HYPOTHYROID    Elbow fracture, left    GERD (gastroesophageal reflux disease)    High cholesterol    Hyperlipidemia    Medial meniscus tear    2012    Neck pain    neck stenosis    OCD (obsessive compulsive disorder)    Osteoarthritis    Pathological dislocation of right upper arm    Sinusitis      Past Surgical History:   Procedure Laterality Date    Arthroscopy of joint unlisted Left     knee    Arthroscopy of joint unlisted Right     knee    Colonoscopy N/A 8/25/2021    Procedure: COLONOSCOPY;  Surgeon: Rudi Reinoso MD;  Location: [facility] ENDO    Colonoscopy N/A 9/10/2024    Procedure: COLONOSCOPY;  Surgeon: Rudi Reinoso MD;  Location: [facility] ENDO    Correct bunion,othr methods Right     Foot surgery Right 1986    Bunionectomy Bone spur R Foot    Fracture surgery Left 2015    elbow radial head replacement    Knee replacement surgery Bilateral 2017    Other surgical history Left     Head of the radius    Total knee replacement Right 3/20/17    DR. PATRICK    Total knee replacement Left 07/31/2017    DR. PATRICK      Family History   Problem Relation Age of Onset    Other (Myocardial  Infarction[other]) Brother     Psychiatric Father         alzheimer's    Heart Disorder Mother     Cancer Mother         lung    Other (Alzheimer's Disease[other]) Other         Family H    Heart Disease Other         Family H/O      Social History:   Social History     Socioeconomic History    Marital status: Life Partner   Tobacco Use    Smoking status: Never     Passive exposure: Never    Smokeless tobacco: Never   Vaping Use    Vaping status: Never Used   Substance and Sexual Activity    Alcohol use: Yes     Alcohol/week: 4.0 standard drinks of alcohol     Types: 4 Glasses of wine per week     Comment: social    Drug use: No    Sexual activity: Not Currently     Partners: Male   Other Topics Concern    Caffeine Concern Yes     Comment: 1 Cup Soda Daily    Exercise No   Social History Narrative    The patient does not use an assistive device..      The patient does live in a home with stairs.        Medications (Active prior to today's visit):  Current Outpatient Medications   Medication Sig Dispense Refill    ondansetron (ZOFRAN) 4 mg tablet Take 1 tablet (4 mg total) by mouth every 8 (eight) hours as needed for Nausea. 20 tablet 0    omeprazole 20 MG Oral Capsule Delayed Release Take 1 capsule (20 mg total) by mouth every morning for 7 days. 7 capsule 0    moxifloxacin 0.5 % Ophthalmic Solution       prednisoLONE 1 % Ophthalmic Suspension       atorvastatin 40 MG Oral Tab Take 1 tablet (40 mg total) by mouth daily. 90 tablet 3    triamcinolone 0.1 % External Ointment Apply 1 Application  topically 2 (two) times daily as needed. 15 g 3    QUEtiapine Fumarate (SEROQUEL) 25 MG Oral Tab Take 1 tablet (25 mg total) by mouth nightly.  0    fluticasone propionate 50 MCG/ACT Nasal Suspension 2 sprays by Nasal route as needed.      Escitalopram Oxalate (LEXAPRO) 20 MG Oral Tab Take 0.5 tablets (10 mg total) by mouth daily. Given by psych  0       Allergies:  Allergies   Allergen Reactions    Penicillin G HIVES     Penicillins HIVES         ROS:   The patient denies any chest pain or shortness of breath,  No neurologic or dermatologic symptoms.     PHYSICAL EXAM:   Blood pressure 145/86, pulse 69, height 5' 6\" (1.676 m), weight 160 lb (72.6 kg), not currently breastfeeding.    The patient appears their stated age and is in no acute distress  HEENT- anicteric sclera, neck no lymphadnopathy, OP- clear with no masses or lesions  Chest- Clear bilaterally, no wheezing,  Heart- regular rate, no murmur or gallop  Abdomen- Soft and nontender, nondistended  Ext- no clubbing or cyanosis  Skin- no rashes or lesions  Neuro- appropriate response, alert, no confusion  .  ASSESSMENT/PLAN:   Assessment     Nausea/bloating    The patient has had some symptoms nausea and bloating after recent colonoscopy.  No red flags on abdominal exam, she describes a nausea sensation which may be related to postanesthesia, possible mild ileus, gastritis.  Plan a trial of PPI therapy for 1 week and then also Zofran as needed for symptom control.    The patient will call back or go to the emergency room with severe symptoms.  Discussed warning signs such as fever, abdominal pain, blood in the stools.    I did review the results of her recent colonoscopy, she had 5 polyps and would advise recall colonoscopy in 3 years time.         Orders This Visit:  No orders of the defined types were placed in this encounter.      Meds This Visit:  Requested Prescriptions     Signed Prescriptions Disp Refills    ondansetron (ZOFRAN) 4 mg tablet 20 tablet 0     Sig: Take 1 tablet (4 mg total) by mouth every 8 (eight) hours as needed for Nausea.    omeprazole 20 MG Oral Capsule Delayed Release 7 capsule 0     Sig: Take 1 capsule (20 mg total) by mouth every morning for 7 days.       Imaging & Referrals:  None       Rudi Reinoso MD  [facility] Gastroenterology

## 2024-09-11 NOTE — PATIENT INSTRUCTIONS
Nausea   Bloating   - start on zofran and omeprazole   - bland diet x several days  - if develop pain and or fevers/worsening symptoms then call or ER

## 2024-09-12 ENCOUNTER — TELEPHONE (OUTPATIENT)
Facility: CLINIC | Age: 69
End: 2024-09-12

## 2024-09-12 ENCOUNTER — OFFICE VISIT (OUTPATIENT)
Dept: PHYSICAL THERAPY | Age: 69
End: 2024-09-12
Attending: PHYSICAL MEDICINE & REHABILITATION
Payer: MEDICARE

## 2024-09-12 PROCEDURE — 97140 MANUAL THERAPY 1/> REGIONS: CPT

## 2024-09-12 PROCEDURE — 97110 THERAPEUTIC EXERCISES: CPT

## 2024-09-12 NOTE — TELEPHONE ENCOUNTER
Left message to call back.      Health maintenance updated.  3 year colonoscopy recall placed in patient outreach.Next due on 09/10/2027 per Dr. Reinoso.

## 2024-09-12 NOTE — TELEPHONE ENCOUNTER
Dr. Reinoso    Result note reviewed with patient.  She said that she is better than yesterday but still a little bit achy and has episodes of nausea.  Denies fevers or chills, she has had no bowel movement yet.    Asking if you would recommend she try something to get her bowels moving.    Please advise.    Thank you

## 2024-09-12 NOTE — PROGRESS NOTES
Diagnosis:      Achilles tendinitis, right leg (M76.61)        Referring Provider: Ana Babcock  Date of Evaluation:    8/6/2024    Precautions:  None Next MD visit:   none scheduled  Date of Surgery: n/a    Insurance Primary/Secondary: [facility] / N/A     # Auth Visits: 14 visits            Subjective: was feeling really good after last session for several days, but this weekend ran around with grand kids for a while and flared up the achilles a little bit.    Assessment: Performed dermal myofascial release at achilles followed by eccentric loading and pt reported no pain post treatment with ambulation.  Discussed exercise tolerance and gradual progression of loading and stepping back if aggravated. She verbalized agreement and understanding..      Pain: 3/10 on average today due to walking dog      Objective:  - able to perform eccentric heel lower with no pain today  - moderate right achilles tendon hypertrophy  - severe TTP at mid portion of right achilles tendon  - dermal myofascial restrictions at achilles tendon, lateral of the tendon vertical adhesions  - ambulation 3/10 pain noted      Goals: (to be met in 8 visits)  Pt will demonstrate improved DF AROM to >= 10 degrees to promote proper foot clearance during gait and greater ease descending stairs without compensation - MET  Pt will have increased ankle strength to 5/5 throughout for improved ankle control with ADLs such as prolonged gait and stair negotiation - Progressing  Pt will report <0/10 pain with work and home activities such as prolonged walking - Progressing  Pt will be independent and compliant with comprehensive HEP to maintain progress achieved in PT - Progressing      Plan: Continue skilled Physical Therapy 1 x/week or a total of 4-6 visits over a 90 day period. Treatment will include: manual therapy, therapeutic exercise, therapeutic activity       Patient/Family/Caregiver was advised of these findings, precautions, and treatment options  and has agreed to actively participate in planning and for this course of care.    Thank you for your referral. If you have any questions, please contact me at Dept: [phone].    Sincerely,  Electronically signed by therapist: Tripp Robbins PT     Physician's certification required:  Yes  Please co-sign or sign and return this letter via fax as soon as possible to [phone].   I certify the need for these services furnished under this plan of treatment and while under my care.    X___________________________________________________ Date____________________    Certification From: 9/3/2024  To:12/2/2024    Date: 8/20/24                TX#: 4/8 Date:  8/23/24               TX#: 5/8 Date: 8/27/24  Tx#: 6/8 Date: 8/30/24  Tx#: 7/8 Date: 9/3/24  Tx#: 8/8 Date: 9/3/24  Tx#: 9/14   MT (20 min)  - Dermal myofascial release at achilles tendon, vertical, not mid achilles but around where less TTP MT (20 min)  - Dermal myofascial release at achilles tendon, vertical, not mid achilles but around where less TTP MT (30 min)  - Lumbar PAIVM assessment  - CPA L4-5, grade III-, no change in pain with gait  - UPA L4-5, grade III- on right, no change in pain with gait  - Dermal myofascial release at achilles tendon, vertical adhesions, decreased pain to 1/10 with gait MT (30 min)  - Dermal myofascial release at achilles tendon, vertical adhesions, decreased pain to 1/10 with gait  - Soft tissue mobilization soleus muscle MT (30 min)  - Dermal myofascial release at achilles tendon, vertical adhesions, distal attachment focused decreased pain to 1/10 with gait  - Soft tissue mobilization soleus muscle, gastroc muscle MT (35 min)  - Dermal myofascial release at achilles tendon, vertical adhesions, distal attachment focused decreased pain to 1/10 with gait  - Soft tissue mobilization soleus muscle, gastroc muscle   TE (23 min)  - BFR training, explanation of benefits and protocol  - 75% occlusion pressure, 30-15-15-15 with resisted  ankle PF using rubber ball  - ambulation assessment NMRE (15 min)  - neuroprovocation testing  - Supine sciatic nerve glide, ankle bias, 2x15  - Slump position, sciatic nerve flossing, 1x10, discontinued due to increase in back pain TE (10 min)  - ambulation assessment  - sciatic nerve glide, 2x15 supine, no change in pain with gait TE (10 min)  - Ambulation assessment  - isometric ankle PF, 10s hold x 10 TE (8 min)  - Ambulation assessment  - heel raise assessment TE (8 min)  - Ambulation assessment  - eccentric heel lowering, 1x10 // decreased symptoms post     TE ( 8 min)  - Eccentric calf raise on right with left foot assist, 2x5               HEP:   - Eccentric heel raise, 3x5  - Toe yoga, sitting and standing  - Split stance, big toe flexion, red TB, isometric,     Charges: MT 2 , TE 1       Total Timed Treatment: 43 min  Total Treatment Time: 43 min    Objective at IE:  Observation: moderate right achilles tendon hypertrophy  Palpation: severe TTP at mid portion of right achilles tendon  Sensation: diminished L5 dermatome on right     Strength/MMT: (* denotes performed with pain)  Hip Foot/Ankle   Flexion: R 5/5; L 5/5  Extension: R nt/5; L nt/5  Abduction: R nt/5; L nt/5  ER: R nt/5; L nt/5  IR: R nt/5; L nt/5 DF: R 5/5; L 5/5  PF: R 4-/5; L 5/5  INV: R 4/5; L 5/5  EV: R 5/5; L 5/5  Great toe ext: R 4/5; L 5/5      Gait: pt ambulates on level ground with antalgia on right, slight less push off  Balance: SLS: R 10 sec, L 10 sec

## 2024-09-12 NOTE — TELEPHONE ENCOUNTER
----- Message from Rudi Reinoso sent at 9/11/2024  5:47 PM CDT -----  I wanted to get back to you with your colonoscopy results.  You had 5 colon polyps removed which were benign.  I would advise a repeat colonoscopy in 3 years to make sure no new polyps are forming.      You also have internal hemorrhoids.  Please call with any questions.

## 2024-09-12 NOTE — TELEPHONE ENCOUNTER
I reviewed below message from Dr. Reinoso with the patient and she voiced understanding.  She told me that she feels better now than when I spoke to her earlier today.

## 2024-09-13 ENCOUNTER — HOSPITAL ENCOUNTER (OUTPATIENT)
Dept: CT IMAGING | Age: 69
Discharge: HOME | End: 2024-09-13
Attending: INTERNAL MEDICINE
Payer: MEDICARE

## 2024-09-13 ENCOUNTER — TELEPHONE (OUTPATIENT)
Facility: CLINIC | Age: 69
End: 2024-09-13

## 2024-09-13 ENCOUNTER — OFFICE VISIT (OUTPATIENT)
Dept: PHYSICAL THERAPY | Facility: HOSPITAL | Age: 69
End: 2024-09-13
Attending: NURSE PRACTITIONER
Payer: MEDICARE

## 2024-09-13 DIAGNOSIS — R10.33 PERIUMBILICAL ABDOMINAL PAIN: Primary | ICD-10-CM

## 2024-09-13 DIAGNOSIS — R10.33 PERIUMBILICAL ABDOMINAL PAIN: ICD-10-CM

## 2024-09-13 PROCEDURE — 97140 MANUAL THERAPY 1/> REGIONS: CPT

## 2024-09-13 PROCEDURE — 97110 THERAPEUTIC EXERCISES: CPT

## 2024-09-13 PROCEDURE — 82565 ASSAY OF CREATININE: CPT

## 2024-09-13 PROCEDURE — 74177 CT ABD & PELVIS W/CONTRAST: CPT | Performed by: INTERNAL MEDICINE

## 2024-09-13 NOTE — TELEPHONE ENCOUNTER
Patient called with update.  She is still having some tenderness above and below belly button. Please call.

## 2024-09-13 NOTE — TELEPHONE ENCOUNTER
Dr. Reinoso    I spoke to Sharri.  Reports pain along line of belly button.  If she presses just below it there is tenderness  Has generalized abdominal discomfort  Rates this pain a 3-4/10  Denies fevers or chills.  States stomach has been uncomfortable since the procedure  Also gets a little bit of nausea.  Still has not moved her bowels since procedure.  She has passed gas since but not very much just a couple small amounts of gas.    Please advise    Thank you

## 2024-09-13 NOTE — PROGRESS NOTES
Diagnosis:     Spinal stenosis of lumbar region with neurogenic claudication (M48.062)  Lumbar spondylosis (M47.816)      Referring Provider: Samara Cui  Date of Evaluation:    8/30/2024     Precautions:   Anxiety, Osteoarthritis Next MD visit:   none scheduled  Date of Surgery: n/a   Insurance Primary/Secondary: [employer] MCR / N/A     # Auth Visits: no auth, 10 per POC            Subjective: Cancelled Monday's session due to colonoscopy, states she has had stomach pain since. States she had a little low back pain setback after playing with her great nieces all weekend, was moving around a lot.   Pain: 4/10 current      Objective:   Lumbar AROM: (* denotes performed with pain)  Flexion: 82*   Extension: 15  Sidebending: R to knee*; L grossly 50%*   Rotation: R WFL; L WFL     Accessory motion: Hypomobility with radicular sx at lower lumbar spine, central P-A and L3-L5 R unilateral PA  Palpation: TTP throughout lumbar spine, lumbar paraspinals      Strength: (* denotes performed with pain)  LE   Hip flexion (L2): R 4/5; L 5/5  Hip abduction: R 4-*/5; L 4*/5  Hip Extension: R 4*/5; L 4+/5            Hip ER: R 4+/5; L 4+/5  Hip IR: R 4+/5; L 4+/5  Knee Flexion: R 5/5; L 5/5            Knee extension (L3): R 5/5; L 5/5            DF (L4): R 5/5; L 5/5         Flexibility:   LE   Hip Flexor: R minimal restriction, L WFL  Hamstrings: Not Tested  Piriformis: Not Tested      Special tests:   Lumbar Quadrant Testing: + for sx reproduction with R/L extension plane (L>R)   Slump: Not Tested  SLR: Not Tested      Gait: pt ambulates on level ground with  decreased hip extension bilaterally, antalgia, and decreased trunk rotation  .      Assessment: Soft tissue restriction and decreased flexibility noted at R piriformis playing role in R sided low back pain; addressed with manual therapy, stretching and strengthening, fatigue noted end of session. Pt reports subj improvement after R lumbar gapping.       Goals: (to be met in 10  visits)   Pt will improve transversus abdominis recruitment to perform proper isometric contraction without requiring verbal or tactile cuing to promote advancement of therex   Pt will demonstrate good understanding of proper posture and body mechanics to decrease pain and improve spinal safety   Pt will improve lumbar spine AROM flexion to >85 deg pain free to allow increase ease with bending forward to don shoes   Pt will improve lumbar extension AROM   Pt will report improved symptom centralization and absence of radicular symptoms for 3 consecutive days to improve function with ADL   Pt will have decreased paraspinal mm tension to tolerate performing supine<>sit transfers.    Pt will demonstrate improved core strength to be able to mow the lawn with <3/10 pain   Pt will be independent and compliant with comprehensive HEP to maintain progress achieved in PT    Oswestry Disability Index Score  Score: 32 % (8/28/2024  6:19 PM)    Plan: Patient will be seen for 1-2 x/week or a total of 10 visits over a 90 day period. Treatment will include: Gait training, Manual Therapy, Neuromuscular Re-education, Self-Care Home Management, Therapeutic Activities, Therapeutic Exercise, and Home Exercise Program instruction   Future Session Consider: modified teofilo falcon, hip extension on table, RDL   Date: 9/6/2024  TX#: 2/10 Date:  9/13/2024             TX#: 3/10 Date:                 TX#: 4/ Date:                 TX#: 5/ Date:   Tx#: 6/   Manual: 12'   STM lumbar/ low thoracic paraspinals (R>L) Manual: 15'   S/l STM lumbar/ low thoracic paraspinals (R)  L s/l R facet opening (sx relief)         TE 30'   Seated SB roll, lumbar flexion/R/L x10  Prone TKE on blue Ball x15ea R/L   H/L PPT with tactile cue, x20  H/L march with PPT x10 ea  SB DKTC x20  S/L open book, R/L x15 ea   TE: 30  Seated thoracic extension on 1/2 foam (inc pain) x10  S/L open book, R/L x10ea   H/L piriformis stretch 4x20s R/L   H/L clamshell--> S/L GTB  R/L x15ea (15 supine R)   Seated SB roll, lumbar flexion, L  x10 ea   Standing hip extension on slider x20 R/L                       HEP: Access Code: [email or web address]  URL: [email or web address]  Date: 09/06/2024  Prepared by: Fawn Ford    Exercises  - Standing Back Extension at Wall  - 1-2 x daily - 7 x weekly - 2 sets - 10 reps - 3 second hold  - Prone Press Up On Elbows  - 1-2 x daily - 7 x weekly - 2 sets - 10 reps - 3 second hold  - Sidelying Thoracic Rotation with Open Book  - 1 x daily - 7 x weekly - 2 sets - 10 reps - 5 second  hold  - Seated Flexion Stretch with Swiss Ball  - 1 x daily - 7 x weekly - 2 sets - 10 reps    Charges: 1 Manual, 2 TE       Total Timed Treatment: 45' min  Total Treatment Time: 45' min

## 2024-09-13 NOTE — TELEPHONE ENCOUNTER
Abdominal pain now with palpation - uncomfortable stomach.  Nausea seems less.  No bowel movement.  She does not feel she is go to emergency room at this time, no vomiting.  She is eating.  No fevers or chills.    Plan - CT scan abdomen/pelvis  -ER if severe symptoms

## 2024-09-13 NOTE — TELEPHONE ENCOUNTER
Thank you   Patient is scheduled for the CT scan.  Your Appointments      Friday September 13, 2024 7:00 PM  CT ABDOMEN PELVIS WITH CONTRAST with PF CT RM1  [facility] ([facility]) [address] [place] [id number]  [phone]

## 2024-09-14 LAB
CREAT BLD-MCNC: 0.9 MG/DL
EGFRCR SERPLBLD CKD-EPI 2021: 69 ML/MIN/1.73M2 (ref 60–?)

## 2024-09-16 ENCOUNTER — OFFICE VISIT (OUTPATIENT)
Dept: PHYSICAL THERAPY | Facility: HOSPITAL | Age: 69
End: 2024-09-16
Attending: NURSE PRACTITIONER
Payer: MEDICARE

## 2024-09-16 PROCEDURE — 97110 THERAPEUTIC EXERCISES: CPT

## 2024-09-16 PROCEDURE — 97140 MANUAL THERAPY 1/> REGIONS: CPT

## 2024-09-16 NOTE — PROGRESS NOTES
Diagnosis:     Spinal stenosis of lumbar region with neurogenic claudication (M48.062)  Lumbar spondylosis (M47.816)      Referring Provider: Samara Cui  Date of Evaluation:    8/30/2024     Precautions:   Anxiety, Osteoarthritis Next MD visit:   none scheduled  Date of Surgery: n/a   Insurance Primary/Secondary: [employer] MCR / N/A     # Auth Visits: no auth, 10 per POC            Subjective:  Has had a lot of GI issues so has been limited by stomach pain; also moving soon so spent the weekend doing work around the house and packing, which she thinks has flared up her back.     Pain: 4/10 current, 1/10 end of session      Objective:     Lumbar AROM: (* denotes performed with pain)  Evaluation   Flexion: 82*   Extension: 15  Sidebending: R to knee*; L grossly 50%*   Rotation: R WFL; L WFL        Accessory motion: Hypomobility with radicular sx at lower lumbar spine, central P-A and L3-L5 R unilateral PA  Palpation: TTP throughout lumbar spine, lumbar paraspinals      Strength: (* denotes performed with pain)  LE   Hip flexion (L2): R 4/5; L 5/5  Hip abduction: R 4-*/5; L 4*/5  Hip Extension: R 4*/5; L 4+/5            Hip ER: R 4+/5; L 4+/5  Hip IR: R 4+/5; L 4+/5  Knee Flexion: R 5/5; L 5/5            Knee extension (L3): R 5/5; L 5/5            DF (L4): R 5/5; L 5/5         Flexibility:   LE   Hip Flexor: R minimal restriction, L WFL  Hamstrings: Not Tested  Piriformis: Not Tested      Special tests:   Lumbar Quadrant Testing: + for sx reproduction with R/L extension plane (L>R)   Slump: Not Tested  SLR: Not Tested      Gait: pt ambulates on level ground with  decreased hip extension bilaterally, antalgia, and decreased trunk rotation  .      Assessment: decreased L lumbar side bend noted, improving slightly after gapping and lateral lean stretch at wall, given for HEP. Reviewing bending and lifting mechanics with patient, would benefit from continued TrA and gluteal strength training to allow proper muscle  activation with tasks.       Goals: (to be met in 10 visits)   Pt will improve transversus abdominis recruitment to perform proper isometric contraction without requiring verbal or tactile cuing to promote advancement of therex   Pt will demonstrate good understanding of proper posture and body mechanics to decrease pain and improve spinal safety   Pt will improve lumbar spine AROM flexion to >85 deg pain free to allow increase ease with bending forward to don shoes   Pt will improve lumbar extension AROM   Pt will report improved symptom centralization and absence of radicular symptoms for 3 consecutive days to improve function with ADL   Pt will have decreased paraspinal mm tension to tolerate performing supine<>sit transfers.    Pt will demonstrate improved core strength to be able to mow the lawn with <3/10 pain   Pt will be independent and compliant with comprehensive HEP to maintain progress achieved in PT      Oswestry Disability Index Score  Score: 32 % (8/28/2024  6:19 PM)    Plan: Patient will be seen for 1-2 x/week or a total of 10 visits over a 90 day period. Treatment will include: Gait training, Manual Therapy, Neuromuscular Re-education, Self-Care Home Management, Therapeutic Activities, Therapeutic Exercise, and Home Exercise Program instruction   Future Session Consider: modified deadbug, clamshell, hip extension on table, RDL   Date: 9/6/2024  TX#: 2/10 Date:  9/13/2024             TX#: 3/10 Date:  9/16/2024                TX#: 4/10 Date:                 TX#: 5/ Date:   Tx#: 6/   Manual: 12'   STM lumbar/ low thoracic paraspinals (R>L) Manual: 15'   S/l STM lumbar/ low thoracic paraspinals (R)  L s/l R facet opening (sx relief)    Manual: 15'   S/L STM lumbar/ low thoracic paraspinals (R)  L s/l R facet opening, gapping (sx relief)      TE 30'   Seated SB roll, lumbar flexion/R/L x10  Prone TKE on blue Ball x15ea R/L   H/L PPT with tactile cue, x20  H/L march with PPT x10 ea  SB DKTC x20  S/L open  book, R/L x15 ea   TE: 30  Seated thoracic extension on 1/2 foam (inc pain) x10  S/L open book, R/L x10ea   H/L piriformis stretch 4x20s R/L   H/L clamshell--> S/L GTB R/L x15ea (15 supine R)   Seated SB roll, lumbar flexion, L  x10 ea   Standing hip extension on slider x20 R/L    TE: 25'   R trunk lean at wall x15 (sx relieving)   S/L open book, R x10ea   H/L piriformis stretch 4x20s R  DKTC x20  Crunch to SB, H/L x20, 5s hold   S/L clamshell, RTB x20ea R/L   RDL, small range, 2# cane x15                       HEP: Access Code: [email or web address]  URL: [email or web address]  Date: 09/16/2024  Prepared by: Fawn Ford    Exercises  - Sidelying Thoracic Rotation with Open Book  - 1 x daily - 7 x weekly - 2 sets - 10 reps - 5 second  hold  - Seated Flexion Stretch with Swiss Ball  - 1 x daily - 7 x weekly - 2 sets - 10 reps  - Standing Sidebends  - 1 x daily - 7 x weekly - 2 sets - 10 reps - 5 second hold  - Supine Posterior Pelvic Tilt  - 1 x daily - 7 x weekly - 2 sets - 10 reps - 5 second hold    Charges: 1 Manual, 2 TE       Total Timed Treatment: 40 ' min  Total Treatment Time: 40' min

## 2024-09-16 NOTE — TELEPHONE ENCOUNTER
Dr. Reinoso    I spoke to Pastora, she was crying on the phone stating she is \"worried sick\"  States she has lost around 4 lbs in the last week.  She told me that she is stressed because she is worried that she has a tumor somewhere.  She hasn't really cut back on what she is eating except she hasn't been night time eating like she used to.    She has only passed tiny pellets of stool since her procedure last Tuesday.  She is bloated as well.  Abdomen is soft.  She is not in pain, but describes it as a discomfort because when she pushes on her abdomen she has discomfort.    She has no vomiting and no fever.      Please advise    Thank you

## 2024-09-16 NOTE — TELEPHONE ENCOUNTER
CT scan results reviewed with the patient on Friday evening, September 13, 2024.  No signs of perforation or significant abdominal pathology.  She does have a small umbilical hernia.  Discussed bland diet through the weekend, continue on omeprazole and Zofran for nausea.  MiraLAX for constipation and call us with an update in 3- 4 days.

## 2024-09-16 NOTE — TELEPHONE ENCOUNTER
RN to contact pt and see how she is doing  - CT scan results discussed with pt on Friday evening. Negative except for a small umbilical hernia ( typically we just monitor these)

## 2024-09-16 NOTE — TELEPHONE ENCOUNTER
Patient was contacted, and reviewed her results of the CT scan on Friday evening, she is feeling somewhat better and feels that there may be a stress component to her symptoms as well, she did have a bowel movement today.  She was concerned about some degree of weight loss but she had not been eating much as she was feeling nauseous and bloated after the procedure.  No signs of perforation or complication on recent CAT scan.  She has not moved her bowels.  Discussed other options such as retesting with blood work, see gynecologist gynecologic issues follow back in the office.  Patient would like to see how she does for the next week or so, she will call with any ongoing issues or problems.

## 2024-09-16 NOTE — TELEPHONE ENCOUNTER
Patient returned call.  Patient states she still hasn't had bowel movement after procedure last Tuesday  Please call.

## 2024-09-20 ENCOUNTER — OFFICE VISIT (OUTPATIENT)
Dept: PHYSICAL THERAPY | Facility: HOSPITAL | Age: 69
End: 2024-09-20
Attending: NURSE PRACTITIONER
Payer: MEDICARE

## 2024-09-20 PROCEDURE — 97110 THERAPEUTIC EXERCISES: CPT

## 2024-09-20 PROCEDURE — 97140 MANUAL THERAPY 1/> REGIONS: CPT

## 2024-09-20 NOTE — PROGRESS NOTES
Diagnosis:     Spinal stenosis of lumbar region with neurogenic claudication (M48.062)  Lumbar spondylosis (M47.816)      Referring Provider: Samara Cui  Date of Evaluation:    8/30/2024     Precautions:   Anxiety, Osteoarthritis Next MD visit:   none scheduled  Date of Surgery: n/a   Insurance Primary/Secondary: A[employer] MCR / N/A     # Auth Visits: no auth, 10 per POC            Subjective:  Feels that the pain levels are about the same but that the pain is more concentrated at lower back, rather than radiating down the back. Felt good after last session, states it lasted until yesterday, has continued with lifting boxes and moving. Today presents more flared up than usual at start of session.   Pain: 3-4/10      Objective:     9/20/2024:   Elevated R medial Malleolus, iliac crest, symmetrical ASIS  Decreased flexibility R Quadratus Lumborum      Lumbar AROM: (* denotes performed with pain)  Evaluation   Flexion: 82*   Extension: 15  Sidebending: R to knee*; L grossly 50%*   Rotation: R WFL; L WFL        Accessory motion: Hypomobility with radicular sx at lower lumbar spine, central P-A and L3-L5 R unilateral PA  Palpation: TTP throughout lumbar spine, lumbar paraspinals      Strength: (* denotes performed with pain)  LE   Hip flexion (L2): R 4/5; L 5/5  Hip abduction: R 4-*/5; L 4*/5  Hip Extension: R 4*/5; L 4+/5            Hip ER: R 4+/5; L 4+/5  Hip IR: R 4+/5; L 4+/5  Knee Flexion: R 5/5; L 5/5            Knee extension (L3): R 5/5; L 5/5            DF (L4): R 5/5; L 5/5         Flexibility:   LE   Hip Flexor: R minimal restriction, L WFL  Hamstrings: Not Tested  Piriformis: Not Tested      Special tests:   Lumbar Quadrant Testing: + for sx reproduction with R/L extension plane (L>R)   Slump: Not Tested  SLR: Not Tested      Gait: pt ambulates on level ground with  decreased hip extension bilaterally, antalgia, and decreased trunk rotation  .      Assessment: Attempting long axis distraction at RLE to  address elevated medial malleolus and iliac crest; increased pain noted after suggesting negative response. Addressing decreased flexibility and muscle tightness in R QL, leading to R trunk lean and possibly accounting for elevated R pelvis. Improved symptom end of session.        Goals: (to be met in 10 visits)   Pt will improve transversus abdominis recruitment to perform proper isometric contraction without requiring verbal or tactile cuing to promote advancement of therex   Pt will demonstrate good understanding of proper posture and body mechanics to decrease pain and improve spinal safety   Pt will improve lumbar spine AROM flexion to >85 deg pain free to allow increase ease with bending forward to don shoes   Pt will improve lumbar extension AROM   Pt will report improved symptom centralization and absence of radicular symptoms for 3 consecutive days to improve function with ADL   Pt will have decreased paraspinal mm tension to tolerate performing supine<>sit transfers.    Pt will demonstrate improved core strength to be able to mow the lawn with <3/10 pain   Pt will be independent and compliant with comprehensive HEP to maintain progress achieved in PT      Oswestry Disability Index Score  Score: 32 % (8/28/2024  6:19 PM)    Plan: Patient will be seen for 1-2 x/week or a total of 10 visits over a 90 day period. Treatment will include: Gait training, Manual Therapy, Neuromuscular Re-education, Self-Care Home Management, Therapeutic Activities, Therapeutic Exercise, and Home Exercise Program instruction   Future Session Consider: modified teofilo flacon, hip extension on table, RDL, QL stretch at wall, STM QL  Date: 9/6/2024  TX#: 2/10 Date:  9/13/2024             TX#: 3/10 Date:  9/16/2024                TX#: 4/10 Date:  9/20/2024                TX#: 5/10 Date:   Tx#: 6/   Manual: 12'   STM lumbar/ low thoracic paraspinals (R>L) Manual: 15'   S/l STM lumbar/ low thoracic paraspinals (R)  L s/l R facet  opening (sx relief)    Manual: 15'   S/L STM lumbar/ low thoracic paraspinals (R)  L s/l R facet opening, gapping (sx relief)  Manual: 20'   Attempting R long Axis distraction with quick stretch (inc pain)  L s/l R facet opening, gapping, low amplitude  S/L QL stretch with OP      TE 30'   Seated SB roll, lumbar flexion/R/L x10  Prone TKE on blue Ball x15ea R/L   H/L PPT with tactile cue, x20  H/L march with PPT x10 ea  SB DKTC x20  S/L open book, R/L x15 ea   TE: 30  Seated thoracic extension on 1/2 foam (inc pain) x10  S/L open book, R/L x10ea   H/L piriformis stretch 4x20s R/L   H/L clamshell--> S/L GTB R/L x15ea (15 supine R)   Seated SB roll, lumbar flexion, L  x10 ea   Standing hip extension on slider x20 R/L    TE: 25'   R trunk lean at wall x15 (sx relieving)   S/L open book, R x10ea   H/L piriformis stretch 4x20s R  DKTC x20  Crunch to SB, H/L x20, 5s hold   S/L clamshell, RTB x20ea R/L   RDL, small range, 2# cane x15     TE: 22'  QL stretch, S/L 3'   S/L open book, R x15  Seated SB roll, lumbar flexion, L  x10 ea   Passive hip flexion, stretch   Pt edu- anatomy, posturing with visual aide                   HEP: Access Code: [email or web address]  URL: [email or web address]  Date: 09/16/2024  Prepared by: Fawn Ford    Exercises  - Sidelying Thoracic Rotation with Open Book  - 1 x daily - 7 x weekly - 2 sets - 10 reps - 5 second  hold  - Seated Flexion Stretch with Swiss Ball  - 1 x daily - 7 x weekly - 2 sets - 10 reps  - Standing Sidebends  - 1 x daily - 7 x weekly - 2 sets - 10 reps - 5 second hold  - Supine Posterior Pelvic Tilt  - 1 x daily - 7 x weekly - 2 sets - 10 reps - 5 second hold    Charges: 1 Manual, 2 TE       Total Timed Treatment: 42 ' min  Total Treatment Time: 42' min

## 2024-09-23 ENCOUNTER — OFFICE VISIT (OUTPATIENT)
Dept: PHYSICAL THERAPY | Facility: HOSPITAL | Age: 69
End: 2024-09-23
Attending: NURSE PRACTITIONER
Payer: MEDICARE

## 2024-09-23 PROCEDURE — 97110 THERAPEUTIC EXERCISES: CPT

## 2024-09-23 PROCEDURE — 97140 MANUAL THERAPY 1/> REGIONS: CPT

## 2024-09-23 NOTE — PROGRESS NOTES
Diagnosis:   Spinal stenosis of lumbar region with neurogenic claudication (M48.062)  Lumbar spondylosis (M47.816)        Referring Provider: Samara Cui  Date of Evaluation:    8/30/2024     Precautions:   Anxiety, Osteoarthritis Next MD visit:   none scheduled  Date of Surgery: n/a   Insurance Primary/Secondary: AETNA MCR / N/A     # Auth Visits: no auth, 10 per POC            Subjective: Feeling better today than she did on Friday- Put together a  on Sunday and was leaning forward more and found it aggravating. Did dog walk today and is feeling okay.  Pain: 1/10 at rest, 4/10 with movement      Objective:     9/23/2024:   Standing: inferior R PSIS       9/20/2024:   Elevated R medial Malleolus, iliac crest, symmetrical ASIS  Decreased flexibility R Quadratus Lumborum      Lumbar AROM: (* denotes performed with pain)  Evaluation   Flexion: 82*   Extension: 15  Sidebending: R to knee*; L grossly 50%*   Rotation: R WFL; L WFL        Accessory motion: Hypomobility with radicular sx at lower lumbar spine, central P-A and L3-L5 R unilateral PA  Palpation: TTP throughout lumbar spine, lumbar paraspinals      Strength: (* denotes performed with pain)  LE   Hip flexion (L2): R 4/5; L 5/5  Hip abduction: R 4-*/5; L 4*/5  Hip Extension: R 4*/5; L 4+/5            Hip ER: R 4+/5; L 4+/5  Hip IR: R 4+/5; L 4+/5  Knee Flexion: R 5/5; L 5/5            Knee extension (L3): R 5/5; L 5/5            DF (L4): R 5/5; L 5/5         Flexibility:   LE   Hip Flexor: R minimal restriction, L WFL  Hamstrings: Not Tested  Piriformis: Not Tested      Special tests:   Lumbar Quadrant Testing: + for sx reproduction with R/L extension plane (L>R)   Slump: Not Tested  SLR: Not Tested      Gait: pt ambulates on level ground with  decreased hip extension bilaterally, antalgia, and decreased trunk rotation  .      Assessment: Inferior R PSIS noted in static stance, suggesting anteriorly rotation at R innominate; addressed with METS,  symptom resolution after use of METs. Pt continues to demonstrate muscle restriction at R QL, addressed with stretching and OP followed by strengthening, no pain reported end of session.     Goals: (to be met in 10 visits)   Pt will improve transversus abdominis recruitment to perform proper isometric contraction without requiring verbal or tactile cuing to promote advancement of therex   Pt will demonstrate good understanding of proper posture and body mechanics to decrease pain and improve spinal safety   Pt will improve lumbar spine AROM flexion to >85 deg pain free to allow increase ease with bending forward to don shoes   Pt will improve lumbar extension AROM   Pt will report improved symptom centralization and absence of radicular symptoms for 3 consecutive days to improve function with ADL   Pt will have decreased paraspinal mm tension to tolerate performing supine<>sit transfers.    Pt will demonstrate improved core strength to be able to mow the lawn with <3/10 pain   Pt will be independent and compliant with comprehensive HEP to maintain progress achieved in PT      Oswestry Disability Index Score  Score: 32 % (8/28/2024  6:19 PM)    Plan: Patient will be seen for 1-2 x/week or a total of 10 visits over a 90 day period. Treatment will include: Gait training, Manual Therapy, Neuromuscular Re-education, Self-Care Home Management, Therapeutic Activities, Therapeutic Exercise, and Home Exercise Program instruction   Future Session Consider: modified teofilo falcon, hip extension on table, RDL, QL stretch at wall, STM QL  Date: 9/6/2024  TX#: 2/10 Date:  9/13/2024             TX#: 3/10 Date:  9/16/2024                TX#: 4/10 Date:  9/20/2024                TX#: 5/10 Date: 9/23/2024   Tx#: 6/10   Manual: 12'   STM lumbar/ low thoracic paraspinals (R>L) Manual: 15'   S/l STM lumbar/ low thoracic paraspinals (R)  L s/l R facet opening (sx relief)    Manual: 15'   S/L STM lumbar/ low thoracic paraspinals  (R)  L s/l R facet opening, gapping (sx relief)  Manual: 20'   Attempting R long Axis distraction with quick stretch (inc pain)  L s/l R facet opening, gapping, low amplitude  S/L QL stretch with OP   Manual: 15'   Quadratus lumborum OP, L s/l   METS- anterior innominate rotation bias (R hamstring, L hip flexor)    TE 30'   Seated SB roll, lumbar flexion/R/L x10  Prone TKE on blue Ball x15ea R/L   H/L PPT with tactile cue, x20  H/L march with PPT x10 ea  SB DKTC x20  S/L open book, R/L x15 ea   TE: 30  Seated thoracic extension on 1/2 foam (inc pain) x10  S/L open book, R/L x10ea   H/L piriformis stretch 4x20s R/L   H/L clamshell--> S/L GTB R/L x15ea (15 supine R)   Seated SB roll, lumbar flexion, L  x10 ea   Standing hip extension on slider x20 R/L    TE: 25'   R trunk lean at wall x15 (sx relieving)   S/L open book, R x10ea   H/L piriformis stretch 4x20s R  DKTC x20  Crunch to SB, H/L x20, 5s hold   S/L clamshell, RTB x20ea R/L   RDL, small range, 2# cane x15     TE: 22'  QL stretch, S/L 3'   S/L open book, R x15  Seated SB roll, lumbar flexion, L  x10 ea   Passive hip flexion, stretch   Pt edu- anatomy, posturing with visual aide  TE: 25'   Latissimus dorsi stretch, at wall, R x15 (inc sx)   QL stretch, S/L 3'   Standing R hip hike, 4x5, small range  SB DKTC x30  Standing TrA activation to SB on table x15, 5s hold                      HEP: Access Code: [email or web address]  URL: [email or web address]  Date: 09/16/2024  Prepared by: Fawn Ford    Exercises  - Sidelying Thoracic Rotation with Open Book  - 1 x daily - 7 x weekly - 2 sets - 10 reps - 5 second  hold  - Seated Flexion Stretch with Swiss Ball  - 1 x daily - 7 x weekly - 2 sets - 10 reps  - Standing Sidebends  - 1 x daily - 7 x weekly - 2 sets - 10 reps - 5 second hold  - Supine Posterior Pelvic Tilt  - 1 x daily - 7 x weekly - 2 sets - 10 reps - 5 second hold    Charges: 1 Manual, 2 TE       Total Timed Treatment: 40 ' min  Total Treatment  Time: 40 ' min

## 2024-09-24 ENCOUNTER — APPOINTMENT (OUTPATIENT)
Dept: PHYSICAL THERAPY | Facility: HOSPITAL | Age: 69
End: 2024-09-24
Attending: INTERNAL MEDICINE
Payer: MEDICARE

## 2024-09-26 ENCOUNTER — OFFICE VISIT (OUTPATIENT)
Dept: PHYSICAL THERAPY | Age: 69
End: 2024-09-26
Attending: INTERNAL MEDICINE
Payer: MEDICARE

## 2024-09-26 PROCEDURE — 97140 MANUAL THERAPY 1/> REGIONS: CPT

## 2024-09-26 PROCEDURE — 97110 THERAPEUTIC EXERCISES: CPT

## 2024-09-26 NOTE — PROGRESS NOTES
Diagnosis:      Achilles tendinitis, right leg (M76.61)        Referring Provider: Ana Babcock  Date of Evaluation:    8/6/2024    Precautions:  None Next MD visit:   none scheduled  Date of Surgery: n/a    Insurance Primary/Secondary: JO[employer]O MCR / N/A     # Auth Visits: 14 visits            Subjective: Overall the flare up after playing with her grandchildren has just started to subside.  Compliant with exercises and strength is improving.    Assessment: Performed dermal myofascial release at achilles followed by BFR training to increase gastroc strength.  Tolerated       Pain: 2/10 on average today due to walking dog      Objective:  - able to perform eccentric heel lower with no pain today  - moderate right achilles tendon hypertrophy  - severe TTP at mid portion of right achilles tendon  - dermal myofascial restrictions at achilles tendon, lateral of the tendon vertical adhesions  - ambulation 2/10 pain noted      Goals: (to be met in 8 visits)  Pt will demonstrate improved DF AROM to >= 10 degrees to promote proper foot clearance during gait and greater ease descending stairs without compensation - MET  Pt will have increased ankle strength to 5/5 throughout for improved ankle control with ADLs such as prolonged gait and stair negotiation - Progressing  Pt will report <0/10 pain with work and home activities such as prolonged walking - Progressing  Pt will be independent and compliant with comprehensive HEP to maintain progress achieved in PT - Progressing      Plan: Continue skilled Physical Therapy 1 x/week or a total of 4-6 visits over a 90 day period. Treatment will include: manual therapy, therapeutic exercise, therapeutic activity       Patient/Family/Caregiver was advised of these findings, precautions, and treatment options and has agreed to actively participate in planning and for this course of care.    Thank you for your referral. If you have any questions, please contact me at Dept:  [phone].    Sincerely,  Electronically signed by therapist: Tripp Robbins PT     Physician's certification required:  Yes  Please co-sign or sign and return this letter via fax as soon as possible to [phone].   I certify the need for these services furnished under this plan of treatment and while under my care.    X___________________________________________________ Date____________________    Certification From: 9/3/2024  To:12/2/2024    Date: 8/27/24  Tx#: 6/8 Date: 8/30/24  Tx#: 7/8 Date: 9/3/24  Tx#: 8/8 Date: 9/3/24  Tx#: 9/14 Date: 9/26/24  Tx#: 10/14    MT (30 min)  - Lumbar PAIVM assessment  - CPA L4-5, grade III-, no change in pain with gait  - UPA L4-5, grade III- on right, no change in pain with gait  - Dermal myofascial release at achilles tendon, vertical adhesions, decreased pain to 1/10 with gait MT (30 min)  - Dermal myofascial release at achilles tendon, vertical adhesions, decreased pain to 1/10 with gait  - Soft tissue mobilization soleus muscle MT (30 min)  - Dermal myofascial release at achilles tendon, vertical adhesions, distal attachment focused decreased pain to 1/10 with gait  - Soft tissue mobilization soleus muscle, gastroc muscle MT (35 min)  - Dermal myofascial release at achilles tendon, vertical adhesions, distal attachment focused decreased pain to 1/10 with gait  - Soft tissue mobilization soleus muscle, gastroc muscle MT (25 min)  - Dermal myofascial release at achilles tendon, vertical adhesions, distal attachment focused decreased pain to 1/10 with gait  - Soft tissue mobilization soleus muscle, gastroc muscle    TE (10 min)  - ambulation assessment  - sciatic nerve glide, 2x15 supine, no change in pain with gait TE (10 min)  - Ambulation assessment  - isometric ankle PF, 10s hold x 10 TE (8 min)  - Ambulation assessment  - heel raise assessment TE (8 min)  - Ambulation assessment  - eccentric heel lowering, 1x10 // decreased symptoms post TE (15 min)  - BFR 75% LOP,  30-15-15-15 protocol (unable to perform last set of 15), standing bilateral heel raise, pt reported slight light headedness at the end of treatment, but subsided after lying supine for 1 minute                    HEP:   - Eccentric heel raise, 3x5  - Toe yoga, sitting and standing  - Split stance, big toe flexion, red TB, isometric,     Charges: MT 2 , TE 1       Total Timed Treatment: 40 min  Total Treatment Time: 40 min    Objective at IE:  Observation: moderate right achilles tendon hypertrophy  Palpation: severe TTP at mid portion of right achilles tendon  Sensation: diminished L5 dermatome on right     Strength/MMT: (* denotes performed with pain)  Hip Foot/Ankle   Flexion: R 5/5; L 5/5  Extension: R nt/5; L nt/5  Abduction: R nt/5; L nt/5  ER: R nt/5; L nt/5  IR: R nt/5; L nt/5 DF: R 5/5; L 5/5  PF: R 4-/5; L 5/5  INV: R 4/5; L 5/5  EV: R 5/5; L 5/5  Great toe ext: R 4/5; L 5/5      Gait: pt ambulates on level ground with antalgia on right, slight less push off  Balance: SLS: R 10 sec, L 10 sec

## 2024-09-27 ENCOUNTER — OFFICE VISIT (OUTPATIENT)
Dept: PHYSICAL THERAPY | Facility: HOSPITAL | Age: 69
End: 2024-09-27
Attending: NURSE PRACTITIONER
Payer: MEDICARE

## 2024-09-27 PROCEDURE — 97140 MANUAL THERAPY 1/> REGIONS: CPT

## 2024-09-27 PROCEDURE — 97110 THERAPEUTIC EXERCISES: CPT

## 2024-09-27 NOTE — PROGRESS NOTES
Diagnosis:   Spinal stenosis of lumbar region with neurogenic claudication (M48.062)  Lumbar spondylosis (M47.816)        Referring Provider: Samara Cui  Date of Evaluation:    8/30/2024     Precautions:   Anxiety, Osteoarthritis Next MD visit:   none scheduled  Date of Surgery: n/a   Insurance Primary/Secondary: AETNA MCR / N/A     # Auth Visits: no auth, 10 per POC            Subjective: has noticed that leaning against the wall exercises are going well and feel that she goes further; feels that she's overall doing well but gets pain with stepping down quickly putting force through the R leg.     Pain: 2/10       Objective:     9/23/2024:   Standing: inferior R PSIS       9/20/2024:   Elevated R medial Malleolus, iliac crest, symmetrical ASIS  Decreased flexibility R Quadratus Lumborum      Lumbar AROM: (* denotes performed with pain)  Evaluation   Flexion: 82*   Extension: 15  Sidebending: R to knee*; L grossly 50%*   Rotation: R WFL; L WFL        Accessory motion: Hypomobility with radicular sx at lower lumbar spine, central P-A and L3-L5 R unilateral PA  Palpation: TTP throughout lumbar spine, lumbar paraspinals      Strength: (* denotes performed with pain)  LE   Hip flexion (L2): R 4/5; L 5/5  Hip abduction: R 4-*/5; L 4*/5  Hip Extension: R 4*/5; L 4+/5            Hip ER: R 4+/5; L 4+/5  Hip IR: R 4+/5; L 4+/5  Knee Flexion: R 5/5; L 5/5            Knee extension (L3): R 5/5; L 5/5            DF (L4): R 5/5; L 5/5         Flexibility:   LE   Hip Flexor: R minimal restriction, L WFL  Hamstrings: Not Tested  Piriformis: Not Tested      Special tests:   Lumbar Quadrant Testing: + for sx reproduction with R/L extension plane (L>R)   Slump: Not Tested  SLR: Not Tested      Gait: pt ambulates on level ground with  decreased hip extension bilaterally, antalgia, and decreased trunk rotation  .      Assessment: Progressing closed chain exercise in session with cueing for upright posturing, pain remains localized  at R SIJ with quick movement. Strength training at posterior complex tolerated well with minimal pain end of session.      Goals: (to be met in 10 visits)   Pt will improve transversus abdominis recruitment to perform proper isometric contraction without requiring verbal or tactile cuing to promote advancement of therex   Pt will demonstrate good understanding of proper posture and body mechanics to decrease pain and improve spinal safety   Pt will improve lumbar spine AROM flexion to >85 deg pain free to allow increase ease with bending forward to don shoes   Pt will improve lumbar extension AROM   Pt will report improved symptom centralization and absence of radicular symptoms for 3 consecutive days to improve function with ADL   Pt will have decreased paraspinal mm tension to tolerate performing supine<>sit transfers.    Pt will demonstrate improved core strength to be able to mow the lawn with <3/10 pain   Pt will be independent and compliant with comprehensive HEP to maintain progress achieved in PT      Oswestry Disability Index Score  Score: 32 % (8/28/2024  6:19 PM)    Plan: Patient will be seen for 1-2 x/week or a total of 10 visits over a 90 day period. Treatment will include: Gait training, Manual Therapy, Neuromuscular Re-education, Self-Care Home Management, Therapeutic Activities, Therapeutic Exercise, and Home Exercise Program instruction   Future Session Consider: TrA activation, step up  Date: 9/6/2024  TX#: 2/10 Date:  9/13/2024             TX#: 3/10 Date:  9/16/2024                TX#: 4/10 Date:  9/20/2024                TX#: 5/10 Date: 9/23/2024   Tx#: 6/10 Date: 9/27/2024   Tx: 7/10    Manual: 12'   STM lumbar/ low thoracic paraspinals (R>L) Manual: 15'   S/l STM lumbar/ low thoracic paraspinals (R)  L s/l R facet opening (sx relief)    Manual: 15'   S/L STM lumbar/ low thoracic paraspinals (R)  L s/l R facet opening, gapping (sx relief)  Manual: 20'   Attempting R long Axis distraction with  quick stretch (inc pain)  L s/l R facet opening, gapping, low amplitude  S/L QL stretch with OP   Manual: 15'   Quadratus lumborum OP, L s/l   METS- anterior innominate rotation bias (R hamstring, L hip flexor)  Manual: 15'   Quadratus lumborum OP, L s/l   STM posterior capsule, S/L   METS- anterior innominate rotation bias (R hamstring, L hip flexor)      TE 30'   Seated SB roll, lumbar flexion/R/L x10  Prone TKE on blue Ball x15ea R/L   H/L PPT with tactile cue, x20  H/L march with PPT x10 ea  SB DKTC x20  S/L open book, R/L x15 ea   TE: 30  Seated thoracic extension on 1/2 foam (inc pain) x10  S/L open book, R/L x10ea   H/L piriformis stretch 4x20s R/L   H/L clamshell--> S/L GTB R/L x15ea (15 supine R)   Seated SB roll, lumbar flexion, L  x10 ea   Standing hip extension on slider x20 R/L    TE: 25'   R trunk lean at wall x15 (sx relieving)   S/L open book, R x10ea   H/L piriformis stretch 4x20s R  DKTC x20  Crunch to SB, H/L x20, 5s hold   S/L clamshell, RTB x20ea R/L   RDL, small range, 2# cane x15     TE: 22'  QL stretch, S/L 3'   S/L open book, R x15  Seated SB roll, lumbar flexion, L  x10 ea   Passive hip flexion, stretch   Pt edu- anatomy, posturing with visual aide  TE: 25'   Latissimus dorsi stretch, at wall, R x15 (inc sx)   QL stretch, S/L 3'   Standing R hip hike, 4x5, small range  SB DKTC x30  Standing TrA activation to SB on table x15, 5s hold      TE: 30'    S/L clamshell, x20    Bridge, GTB into abduction, 2x10 (inc pain)  QL stretch on wall (lateral lean) 2x10   Standing R hamstring curl 2# 2x10  Standing Hip extension, R/L 2# x20ea   Standing R hip hike, 4x5, small range  Double Leg press, 67# 2x10  Single Leg press, 50# 2x10                   HEP: Access Code: [email or web address]  URL: [email or web address]  Date: 09/16/2024  Prepared by: Fawn Ford    Exercises  - Sidelying Thoracic Rotation with Open Book  - 1 x daily - 7 x weekly - 2 sets - 10 reps - 5 second  hold  - Seated  Flexion Stretch with Swiss Ball  - 1 x daily - 7 x weekly - 2 sets - 10 reps  - Standing Sidebends  - 1 x daily - 7 x weekly - 2 sets - 10 reps - 5 second hold  - Supine Posterior Pelvic Tilt  - 1 x daily - 7 x weekly - 2 sets - 10 reps - 5 second hold    Charges: 1 Manual, 2 TE       Total Timed Treatment: 45 ' min  Total Treatment Time: 45 ' min

## 2024-09-30 ENCOUNTER — OFFICE VISIT (OUTPATIENT)
Dept: PHYSICAL THERAPY | Facility: HOSPITAL | Age: 69
End: 2024-09-30
Attending: NURSE PRACTITIONER
Payer: MEDICARE

## 2024-09-30 PROCEDURE — 97140 MANUAL THERAPY 1/> REGIONS: CPT

## 2024-09-30 PROCEDURE — 97110 THERAPEUTIC EXERCISES: CPT

## 2024-09-30 NOTE — PROGRESS NOTES
Diagnosis:   Spinal stenosis of lumbar region with neurogenic claudication (M48.062)  Lumbar spondylosis (M47.816)        Referring Provider: Samara Cui  Date of Evaluation:    8/30/2024     Precautions:   Anxiety, Osteoarthritis Next MD visit:   none scheduled  Date of Surgery: n/a   Insurance Primary/Secondary: [employer] MCR / N/A     # Auth Visits: no auth, 10 per POC            Subjective: Has been very busy today, washed windows, mowed the lawn and did a lot of packing so is very sore right now. States she was sore yesterday but this morning felt okay until overdoing it in the yard.     Pain: 4/10 , \"better\" by end of session      Objective:     9/23/2024:   Standing: inferior R PSIS       9/20/2024:   Elevated R medial Malleolus, iliac crest, symmetrical ASIS  Decreased flexibility R Quadratus Lumborum      Lumbar AROM: (* denotes performed with pain)  Evaluation   Flexion: 82*   Extension: 15  Sidebending: R to knee*; L grossly 50%*   Rotation: R WFL; L WFL        Accessory motion: Hypomobility with radicular sx at lower lumbar spine, central P-A and L3-L5 R unilateral PA  Palpation: TTP throughout lumbar spine, lumbar paraspinals      Strength: (* denotes performed with pain)  LE   Hip flexion (L2): R 4/5; L 5/5  Hip abduction: R 4-*/5; L 4*/5  Hip Extension: R 4*/5; L 4+/5            Hip ER: R 4+/5; L 4+/5  Hip IR: R 4+/5; L 4+/5  Knee Flexion: R 5/5; L 5/5            Knee extension (L3): R 5/5; L 5/5            DF (L4): R 5/5; L 5/5         Flexibility:   LE   Hip Flexor: R minimal restriction, L WFL  Hamstrings: Not Tested  Piriformis: Not Tested      Special tests:   Lumbar Quadrant Testing: + for sx reproduction with R/L extension plane (L>R)   Slump: Not Tested  SLR: Not Tested      Gait: pt ambulates on level ground with  decreased hip extension bilaterally, antalgia, and decreased trunk rotation  .        Assessment: Progressing TrA activation in session today with good tolerance, pt continues to  demonstrate anteriorly rotated R innominate, which is addressed in session with METs, within session improvement. Trial B heel lift for lumbar symmetry as pt wears R heel lift, increased pain and instability noted so terminated.     Goals: (to be met in 10 visits)   Pt will improve transversus abdominis recruitment to perform proper isometric contraction without requiring verbal or tactile cuing to promote advancement of therex   Pt will demonstrate good understanding of proper posture and body mechanics to decrease pain and improve spinal safety   Pt will improve lumbar spine AROM flexion to >85 deg pain free to allow increase ease with bending forward to don shoes   Pt will improve lumbar extension AROM   Pt will report improved symptom centralization and absence of radicular symptoms for 3 consecutive days to improve function with ADL   Pt will have decreased paraspinal mm tension to tolerate performing supine<>sit transfers.    Pt will demonstrate improved core strength to be able to mow the lawn with <3/10 pain   Pt will be independent and compliant with comprehensive HEP to maintain progress achieved in PT      Oswestry Disability Index Score  Score: 32 % (8/28/2024  6:19 PM)    Plan: Patient will be seen for 1-2 x/week or a total of 10 visits over a 90 day period. Treatment will include: Gait training, Manual Therapy, Neuromuscular Re-education, Self-Care Home Management, Therapeutic Activities, Therapeutic Exercise, and Home Exercise Program instruction   Future Session Consider: TrA activation, step up, Child's pose stretch  Date: 9/6/2024  TX#: 2/10 Date:  9/13/2024             TX#: 3/10 Date:  9/16/2024                TX#: 4/10 Date:  9/20/2024                TX#: 5/10 Date: 9/23/2024   Tx#: 6/10 Date: 9/27/2024   Tx: 7/10  Date: 9/30/2024   Tx: 8/10   Manual: 12'   STM lumbar/ low thoracic paraspinals (R>L) Manual: 15'   S/l STM lumbar/ low thoracic paraspinals (R)  L s/l R facet opening (sx relief)     Manual: 15'   S/L STM lumbar/ low thoracic paraspinals (R)  L s/l R facet opening, gapping (sx relief)  Manual: 20'   Attempting R long Axis distraction with quick stretch (inc pain)  L s/l R facet opening, gapping, low amplitude  S/L QL stretch with OP   Manual: 15'   Quadratus lumborum OP, L s/l   METS- anterior innominate rotation bias (R hamstring, L hip flexor)  Manual: 15'   Quadratus lumborum OP, L s/l   STM posterior capsule, S/L   METS- anterior innominate rotation bias (R hamstring, L hip flexor)    Manual: 12'  Quadratus lumborum OP, L s/l   STM posterior capsule, S/L   METS- anterior innominate rotation bias (R hamstring, L hip flexor)      TE 30'   Seated SB roll, lumbar flexion/R/L x10  Prone TKE on blue Ball x15ea R/L   H/L PPT with tactile cue, x20  H/L march with PPT x10 ea  SB DKTC x20  S/L open book, R/L x15 ea   TE: 30  Seated thoracic extension on 1/2 foam (inc pain) x10  S/L open book, R/L x10ea   H/L piriformis stretch 4x20s R/L   H/L clamshell--> S/L GTB R/L x15ea (15 supine R)   Seated SB roll, lumbar flexion, L  x10 ea   Standing hip extension on slider x20 R/L    TE: 25'   R trunk lean at wall x15 (sx relieving)   S/L open book, R x10ea   H/L piriformis stretch 4x20s R  DKTC x20  Crunch to SB, H/L x20, 5s hold   S/L clamshell, RTB x20ea R/L   RDL, small range, 2# cane x15     TE: 22'  QL stretch, S/L 3'   S/L open book, R x15  Seated SB roll, lumbar flexion, L  x10 ea   Passive hip flexion, stretch   Pt edu- anatomy, posturing with visual aide  TE: 25'   Latissimus dorsi stretch, at wall, R x15 (inc sx)   QL stretch, S/L 3'   Standing R hip hike, 4x5, small range  SB DKTC x30  Standing TrA activation to SB on table x15, 5s hold      TE: 30'    S/L clamshell, x20    Bridge, GTB into abduction, 2x10 (inc pain)  QL stretch on wall (lateral lean) 2x10   Standing R hamstring curl 2# 2x10  Standing Hip extension, R/L 2# x20ea   Standing R hip hike, 4x5, small range  Double Leg press, 67#  2x10  Single Leg press, 50# 2x10 TE: 30'  Seated SB flexion, x12, 5s hold  Attempting L heel lift, unsuccessful  H/L piriformis stretch 5x20s  S/L clamshell x20  Sahrmann L2 Up/up/down/down 2x10ea  Standing shoulder extension, GTB x20  Pallof press, Blue TB x15 ea  Modified Bird Dog on elevated plinth, 2x10 ea                      HEP: Access Code: [email or web address]  URL: [email or web address]  Date: 09/16/2024  Prepared by: Fawn Ford    Exercises  - Sidelying Thoracic Rotation with Open Book  - 1 x daily - 7 x weekly - 2 sets - 10 reps - 5 second  hold  - Seated Flexion Stretch with Swiss Ball  - 1 x daily - 7 x weekly - 2 sets - 10 reps  - Standing Sidebends  - 1 x daily - 7 x weekly - 2 sets - 10 reps - 5 second hold  - Supine Posterior Pelvic Tilt  - 1 x daily - 7 x weekly - 2 sets - 10 reps - 5 second hold    Charges: 1 Manual, 2 TE       Total Timed Treatment: 42 ' min  Total Treatment Time: 42 ' min

## 2024-10-03 ENCOUNTER — OFFICE VISIT (OUTPATIENT)
Dept: PHYSICAL THERAPY | Age: 69
End: 2024-10-03
Attending: INTERNAL MEDICINE
Payer: MEDICARE

## 2024-10-03 PROCEDURE — 97530 THERAPEUTIC ACTIVITIES: CPT

## 2024-10-03 PROCEDURE — 97140 MANUAL THERAPY 1/> REGIONS: CPT

## 2024-10-03 NOTE — PROGRESS NOTES
Diagnosis:      Achilles tendinitis, right leg (M76.61)        Referring Provider: Ana Babcock  Date of Evaluation:    8/6/2024    Precautions:  None Next MD visit:   none scheduled  Date of Surgery: n/a    Insurance Primary/Secondary: DAVIDTJULIA MCR / N/A     # Auth Visits: 14 visits             Progress Summary  Pt has attended 11 visits in Physical Therapy.      Subjective: Pain overall has improved and it's around a 2/10 today.  Having more days where she doesn't wake up with pain.  Still having increased pain with dog walking, but recovering faster.  Compliant with home exercises.    Assessment: Pt is progressing towards goals with home exercises and activity modification along with graded exposure. Pt reports that she better understands her condition on managing her symptoms while being persistent with loaded exercises to build up load tolerance and tissue resilience. She feels that she can manage the exercises at home and due to being in a busy season in her life wants to try continuing on her own at home.       Pain: 2/10 on average      Objective:  - able to perform eccentric heel lower with no pain today  - moderate right achilles tendon hypertrophy  - severe TTP at mid portion of right achilles tendon  - dermal myofascial restrictions at achilles tendon, lateral of the tendon vertical adhesions  - ambulation 2/10 pain noted      Goals: (to be met in 8 visits)  Pt will demonstrate improved DF AROM to >= 10 degrees to promote proper foot clearance during gait and greater ease descending stairs without compensation - MET  Pt will have increased ankle strength to 5/5 throughout for improved ankle control with ADLs such as prolonged gait and stair negotiation - Progressing  Pt will report <0/10 pain with work and home activities such as prolonged walking - Progressing  Pt will be independent and compliant with comprehensive HEP to maintain progress achieved in PT - Progressing      Global rating of change scale:  +3 somewhat better    Plan: Discharge from PT    Patient/Family/Caregiver was advised of these findings, precautions, and treatment options and has agreed to actively participate in planning and for this course of care.    Thank you for your referral. If you have any questions, please contact me at Dept: [phone].    Sincerely,  Electronically signed by therapist: Tripp Robbins, PT     Physician's certification required:  NO  Please co-sign or sign and return this letter via fax as soon as possible to [phone].   I certify the need for these services furnished under this plan of treatment and while under my care.    X___________________________________________________ Date____________________    Certification From: 9/3/2024  To:12/2/2024    Date: 8/27/24  Tx#: 6/8 Date: 8/30/24  Tx#: 7/8 Date: 9/3/24  Tx#: 8/8 Date: 9/3/24  Tx#: 9/14 Date: 9/26/24  Tx#: 10/14 Date: 10/3/24  Tx#: 11/14   MT (30 min)  - Lumbar PAIVM assessment  - CPA L4-5, grade III-, no change in pain with gait  - UPA L4-5, grade III- on right, no change in pain with gait  - Dermal myofascial release at achilles tendon, vertical adhesions, decreased pain to 1/10 with gait MT (30 min)  - Dermal myofascial release at achilles tendon, vertical adhesions, decreased pain to 1/10 with gait  - Soft tissue mobilization soleus muscle MT (30 min)  - Dermal myofascial release at achilles tendon, vertical adhesions, distal attachment focused decreased pain to 1/10 with gait  - Soft tissue mobilization soleus muscle, gastroc muscle MT (35 min)  - Dermal myofascial release at achilles tendon, vertical adhesions, distal attachment focused decreased pain to 1/10 with gait  - Soft tissue mobilization soleus muscle, gastroc muscle MT (25 min)  - Dermal myofascial release at achilles tendon, vertical adhesions, distal attachment focused decreased pain to 1/10 with gait  - Soft tissue mobilization soleus muscle, gastroc muscle MT (25 min)  - Dermal myofascial  release at achilles tendon, vertical adhesions, distal attachment focused decreased pain to 1/10 with gait  - Soft tissue mobilization soleus muscle, gastroc muscl   TE (10 min)  - ambulation assessment  - sciatic nerve glide, 2x15 supine, no change in pain with gait TE (10 min)  - Ambulation assessment  - isometric ankle PF, 10s hold x 10 TE (8 min)  - Ambulation assessment  - heel raise assessment TE (8 min)  - Ambulation assessment  - eccentric heel lowering, 1x10 // decreased symptoms post TE (15 min)  - BFR 75% LOP, 30-15-15-15 protocol (unable to perform last set of 15), standing bilateral heel raise, pt reported slight light headedness at the end of treatment, but subsided after lying supine for 1 minute TA ( 10 min)  - Education on load tolerance and progressing over load to build tissue resilience along with graded exposure.   - Educated on progression of exercises adding concentric heel raises, weighted heel raises, and plyometric exercises when she can tolerate current exercises and they get easy                   HEP:   - Eccentric heel raise, 3x5  - Toe yoga, sitting and standing  - Split stance, big toe flexion, red TB, isometric,     Charges: MT 2 , TA 1       Total Timed Treatment: 40 min  Total Treatment Time: 40 min    Objective at IE:  Observation: moderate right achilles tendon hypertrophy  Palpation: severe TTP at mid portion of right achilles tendon  Sensation: diminished L5 dermatome on right     Strength/MMT: (* denotes performed with pain)  Hip Foot/Ankle   Flexion: R 5/5; L 5/5  Extension: R nt/5; L nt/5  Abduction: R nt/5; L nt/5  ER: R nt/5; L nt/5  IR: R nt/5; L nt/5 DF: R 5/5; L 5/5  PF: R 4-/5; L 5/5  INV: R 4/5; L 5/5  EV: R 5/5; L 5/5  Great toe ext: R 4/5; L 5/5      Gait: pt ambulates on level ground with antalgia on right, slight less push off  Balance: SLS: R 10 sec, L 10 sec

## 2024-10-04 ENCOUNTER — OFFICE VISIT (OUTPATIENT)
Dept: INTERNAL MEDICINE CLINIC | Facility: CLINIC | Age: 69
End: 2024-10-04

## 2024-10-04 VITALS
DIASTOLIC BLOOD PRESSURE: 88 MMHG | SYSTOLIC BLOOD PRESSURE: 125 MMHG | HEART RATE: 79 BPM | WEIGHT: 153.19 LBS | BODY MASS INDEX: 24.62 KG/M2 | OXYGEN SATURATION: 99 % | HEIGHT: 66 IN

## 2024-10-04 DIAGNOSIS — R09.82 POST-NASAL DRIP: ICD-10-CM

## 2024-10-04 DIAGNOSIS — J30.2 SEASONAL ALLERGIC RHINITIS, UNSPECIFIED TRIGGER: Primary | ICD-10-CM

## 2024-10-04 DIAGNOSIS — H93.8X1 PRESSURE SENSATION IN RIGHT EAR: ICD-10-CM

## 2024-10-04 DIAGNOSIS — R63.4 UNINTENTIONAL WEIGHT LOSS: ICD-10-CM

## 2024-10-04 PROCEDURE — 99213 OFFICE O/P EST LOW 20 MIN: CPT | Performed by: INTERNAL MEDICINE

## 2024-10-04 RX ORDER — ESCITALOPRAM OXALATE 10 MG/1
TABLET ORAL
COMMUNITY
Start: 2024-09-18

## 2024-10-04 NOTE — PROGRESS NOTES
Pastora Cavazos is a 69 year old female with complaints of:  Chief Complaint: Ear Problem (Feeling pressure in right ear ) and Weight Loss (Has lost about 5-6 pounds within a month without trying)    HPI     Pastora Cavazos is a(n) 69 year old female with a history of anxiety, hyperlipidemia, who presents for unintentional weight loss, and right ear fullness.     Unintentional weight loss.  The patient has lost 4-5 pounds the last month without trying.  Patient states that she has been stressed lately. Her cat has , she is in the process of packing for moving, and has been going through some emotional issues (for which she follows with a therapist), Patient has had no decrease in appetite. However of note, the patient is doing physical therapy currently.  Patient has had a colonoscopy in the last month.  She is up-to-date on breast cancer screening.  Incidentally, she has also had a CT of the abdomen pelvis on 2024.  This was done because  patient was having stomach pain status post a colonoscopy.  Overall, no acute abnormality in the abdomen and pelvis is noted.  However a small periumbilical fat-containing hernia was seen. Labs were also done 2024; CBC, CMP, vitamin D, TSH, A1c all WNL. No fatigue, malaise, night sweats, fevers/chills, rashes, joint pains out of proportion to every day life.     Patient also has some pressure and fullness in the ear on the right side. No drainage from the ear. No jaw or tooth pain. No fever, chills, neck pain. No tinnitus. Does have fall allergies and a chronic post-nasal drip.     Past Medical History     Past Medical History:    Anxiety    Anxiety state    Disorder of thyroid    HYPOTHYROID    Elbow fracture, left    GERD (gastroesophageal reflux disease)    High cholesterol    Hyperlipidemia    Medial meniscus tear    2012    Neck pain    neck stenosis    OCD (obsessive compulsive disorder)    Osteoarthritis    Pathological dislocation of right upper arm     Sinusitis        Past Surgical History     Past Surgical History:   Procedure Laterality Date    Arthroscopy of joint unlisted Left     knee    Arthroscopy of joint unlisted Right     knee    Colonoscopy N/A 8/25/2021    Procedure: COLONOSCOPY;  Surgeon: Rudi Reinoso MD;  Location: [facility]    Colonoscopy N/A 9/10/2024    Procedure: COLONOSCOPY;  Surgeon: Rudi Reinoso MD;  Location: [facility]    Correct bunion,othr methods Right     Foot surgery Right 1986    Bunionectomy Bone spur R Foot    Fracture surgery Left 2015    elbow radial head replacement    Knee replacement surgery Bilateral 2017    Other surgical history Left     Head of the radius    Total knee replacement Right 3/20/17    DR. PATRICK    Total knee replacement Left 07/31/2017    DR. PATRICK        Family History     Family History   Problem Relation Age of Onset    Other (Myocardial Infarction[other]) Brother     Psychiatric Father         alzheimer's    Heart Disorder Mother     Cancer Mother         lung    Other (Alzheimer's Disease[other]) Other         Family H    Heart Disease Other         Family H/O       Social History     Social History     Socioeconomic History    Marital status: Life Partner   Tobacco Use    Smoking status: Never     Passive exposure: Never    Smokeless tobacco: Never   Vaping Use    Vaping status: Never Used   Substance and Sexual Activity    Alcohol use: Yes     Alcohol/week: 4.0 standard drinks of alcohol     Types: 4 Glasses of wine per week     Comment: social    Drug use: No    Sexual activity: Not Currently     Partners: Male   Other Topics Concern    Caffeine Concern Yes     Comment: 1 Cup Soda Daily    Exercise No   Social History Narrative    The patient does not use an assistive device..      The patient does live in a home with stairs.       Allergies     Allergies   Allergen Reactions    Penicillin G HIVES    Penicillins HIVES       Current Medications     Current Outpatient Medications   Medication  Sig Dispense Refill    escitalopram 10 MG Oral Tab       moxifloxacin 0.5 % Ophthalmic Solution       prednisoLONE 1 % Ophthalmic Suspension       atorvastatin 40 MG Oral Tab Take 1 tablet (40 mg total) by mouth daily. 90 tablet 3    QUEtiapine Fumarate (SEROQUEL) 25 MG Oral Tab Take 1 tablet (25 mg total) by mouth nightly.  0    fluticasone propionate 50 MCG/ACT Nasal Suspension 2 sprays by Nasal route as needed.      ondansetron (ZOFRAN) 4 mg tablet Take 1 tablet (4 mg total) by mouth every 8 (eight) hours as needed for Nausea. (Patient not taking: Reported on 10/4/2024) 20 tablet 0    triamcinolone 0.1 % External Ointment Apply 1 Application  topically 2 (two) times daily as needed. (Patient not taking: Reported on 10/4/2024) 15 g 3     No current facility-administered medications for this visit.       Review of Systems     GENERAL HEALTH: feels well otherwise  SKIN: denies any unusual skin lesions or rashes  RESPIRATORY: denies shortness of breath with exertion  CARDIOVASCULAR: denies chest pain on exertion  GI: denies abdominal pain and denies heartburn  : denies any burning with urination, urinary frequency or urgency  NEURO: denies headaches, numbness or tingling, mental status changes  PSYCH: denies depressed mood, anxiety  MUSC: denies muscle aches, joint pain    Physical Exam     /88 (BP Location: Left arm, Patient Position: Sitting, Cuff Size: adult)   Pulse 79   Ht 5' 6\" (1.676 m)   Wt 153 lb 3.2 oz (69.5 kg)   SpO2 99%   BMI 24.73 kg/m²     GENERAL: well developed, well nourished,in no apparent distress  SKIN: no rashes,no suspicious lesions  HEENT: atraumatic, normocephalic. L ear normal with pearly grey TM. R TM slightly erythematous; however, no bulging, rupture, or fluid behind ear noted.   NECK: supple,no adenopathy,no bruits  LUNGS: clear to auscultation  CARDIO: RRR without murmur  GI: good BS's,no masses, HSM or tenderness  EXTREMITIES: no cyanosis, clubbing or edema    Assessment  and Plan     Diagnoses and all orders for this visit:    Seasonal allergic rhinitis, unspecified trigger. Post-nasal drip. Pressure sensation in right ear. Continue flonase. Consider low dose OTC antihistamine prn. Patient is on low dose Seroquel, so would be cautious with any OTC antihistamines.     Unintentional weight loss. Work up negative as above. Could be age related muscle wasting vs stress. If weight loss continues, then repeat CBC, CMP, vitamin d, TSH, and consider repeat CT chest/abdomen/pelvis,           escitalopram 10 MG Oral Tab       moxifloxacin 0.5 % Ophthalmic Solution       prednisoLONE 1 % Ophthalmic Suspension       atorvastatin 40 MG Oral Tab Take 1 tablet (40 mg total) by mouth daily. 90 tablet 3    QUEtiapine Fumarate (SEROQUEL) 25 MG Oral Tab Take 1 tablet (25 mg total) by mouth nightly.  0    fluticasone propionate 50 MCG/ACT Nasal Suspension 2 sprays by Nasal route as needed.         Requested Prescriptions      No prescriptions requested or ordered in this encounter       No orders of the defined types were placed in this encounter.      No follow-ups on file.    The patient indicates understanding of these issues and agrees to the plan.    Electronically signed by Onelia Patiño MD 10/04/24

## 2024-10-07 ENCOUNTER — PATIENT MESSAGE (OUTPATIENT)
Dept: INTERNAL MEDICINE CLINIC | Facility: CLINIC | Age: 69
End: 2024-10-07

## 2024-10-07 DIAGNOSIS — R63.4 WEIGHT LOSS, UNINTENTIONAL: Primary | ICD-10-CM

## 2024-10-08 ENCOUNTER — PATIENT MESSAGE (OUTPATIENT)
Dept: INTERNAL MEDICINE CLINIC | Facility: CLINIC | Age: 69
End: 2024-10-08

## 2024-10-08 NOTE — TELEPHONE ENCOUNTER
Dr Ana Babcock=see patient's message and concern , please advise, thanks.     10/4/24 Dr Patiño's office visit note;  Unintentional weight loss. Work up negative as above. Could be age related muscle wasting vs stress. If weight loss continues, then repeat CBC, CMP, vitamin d, TSH, and consider repeat CT chest/abdomen/pelvis,     Future Appointments   Date Time Provider Department Center   10/10/2024  4:20 PM Kathleen Ling APRN [facility]   10/14/2024  5:00 PM Fawn Ford, PT [facility] PT EM [facility]   11/7/2024  4:15 PM Fawn Ford, PT [facility] PT EM [facility]           From: Pastora Cavazos  To: Ana Babcock  Sent: 10/7/2024  1:11 PM CDT  Subject: My weight loss    Hi Dr. Babcock,  I’ve lost about 7-8 pounds in a month and a half with no change in my eating habits. Dr. Patiño pointed out that recent tests have had( blood work, CT scan) showed no concerns but I’m still concerned. She said to keep an eye on the weight loss and note if I lose 10% of my body weight. I don’t want to wait that long. Is there anything else I can pursue? Not sure exactly how much I lost. Could be as much as 9 pounds. I’m worried. Suggestions?  Thanks,  Pastora Cavazos

## 2024-10-08 NOTE — TELEPHONE ENCOUNTER
Ask her to get the CT calcium scoring done and I will place a referral for the dietitian  If continued weight loss and she can see the hematologist  Referrals placed since it may take some time to get in

## 2024-10-09 NOTE — TELEPHONE ENCOUNTER
Sent [employer] message with below.  CT CALCIUM SCORING [[id number]] ([id number])     Referral Type: DIETITIAN EDUCATION     Provider Address Phone   Syeda Chacon MD [facility] [address] [facility] [address] [phone]

## 2024-10-10 ENCOUNTER — OFFICE VISIT (OUTPATIENT)
Dept: OBGYN CLINIC | Facility: CLINIC | Age: 69
End: 2024-10-10
Payer: MEDICARE

## 2024-10-10 VITALS
HEIGHT: 66 IN | BODY MASS INDEX: 24.43 KG/M2 | WEIGHT: 152 LBS | DIASTOLIC BLOOD PRESSURE: 89 MMHG | SYSTOLIC BLOOD PRESSURE: 131 MMHG | HEART RATE: 94 BPM

## 2024-10-10 DIAGNOSIS — Z01.419 WELL WOMAN EXAM WITH ROUTINE GYNECOLOGICAL EXAM: Primary | ICD-10-CM

## 2024-10-10 DIAGNOSIS — Z12.4 SCREENING FOR CERVICAL CANCER: ICD-10-CM

## 2024-10-10 DIAGNOSIS — Z78.0 POSTMENOPAUSAL: ICD-10-CM

## 2024-10-10 DIAGNOSIS — Z13.820 SCREENING FOR OSTEOPOROSIS: ICD-10-CM

## 2024-10-10 DIAGNOSIS — N95.2 VAGINAL ATROPHY: ICD-10-CM

## 2024-10-10 RX ORDER — ESTRADIOL 0.1 MG/G
1 CREAM VAGINAL
Qty: 42.5 G | Refills: 2 | Status: SHIPPED | OUTPATIENT
Start: 2024-10-10 | End: 2024-10-14 | Stop reason: ALTCHOICE

## 2024-10-10 NOTE — PROGRESS NOTES
[facility]    Obstetrics and Gynecology    Chief Complaint   Patient presents with    Gyn Problem     Annual exam, sudden weight loss       Pastora Cavazos is a 69 year old female  No LMP recorded. Patient is postmenopausal. presenting for annual gynecology exam.  Last seen a year ago with Dr. Bain  She is concerned because she lost 8-9 pounds in last 1-2 months, unintentionally. No bleeding since last visit. No pelvic pain or bloating.  Some epigastric pain.  She reports no change in eating pattern but she reports feeling more hungry within an hour after eating. Did see GI and had colonoscopy  Always had loose bowels but not different than normal. No blood or mucous in stool, no nausea or vomiting.  CT of abdomen and pelvis normal last month.    Hx of lichen sclerosus - uses clobetasol 2-3 days a month    Here with her wife, desires something to help with vaginal dryness.    Admits some recent stressors and having anxiety. Seeing therapist    Pap:2020 Nilm, negative HPV     Mammo: 3/2024 Normal  Colonoscopy: 9/10/2024 repeat in   Dexa:  osteopenia    OBSTETRICS HISTORY:  OB History    Para Term  AB Living   0 0 0 0 0 0   SAB IAB Ectopic Multiple Live Births   0 0 0 0 0       GYNE HISTORY:  Period Cycle (Days): POSTMENOPAUSAL (10/10/2024  4:16 PM)  Use of Birth Control (if yes, specify type): Postmenopausal (10/10/2024  4:16 PM)  Pap Date: 20 (10/10/2024  4:16 PM)  Pap Result Notes: PAP NEG / HPV NEG (10/10/2024  4:16 PM)      History   Sexual Activity    Sexual activity: Yes    Partners: Male, Female     Comment: wife           Latest Ref Rng & Units 2020     2:10 PM 2016    11:52 AM   RECENT PAP RESULTS   INTERPRETATION/RESULT: Negative for intraepithelial lesion or malignancy Negative for intraepithelial lesion or malignancy  Negative for intraepithelial lesion or malignancy    HPV Negative Negative  Negative          MEDICAL HISTORY:  Past Medical  History:    Anxiety    Anxiety state    Disorder of thyroid    HYPOTHYROID    Elbow fracture, left    GERD (gastroesophageal reflux disease)    High cholesterol    Hyperlipidemia    Medial meniscus tear    2012    Neck pain    neck stenosis    OCD (obsessive compulsive disorder)    Osteoarthritis    Pathological dislocation of right upper arm    Sinusitis     Past Surgical History:   Procedure Laterality Date    Arthroscopy of joint unlisted Left     knee    Arthroscopy of joint unlisted Right     knee    Colonoscopy N/A 8/25/2021    Procedure: COLONOSCOPY;  Surgeon: Rudi Reinoso MD;  Location: [facility] ENDO    Colonoscopy N/A 9/10/2024    Procedure: COLONOSCOPY;  Surgeon: Rudi Reinoso MD;  Location: [facility] ENDO    Correct bunion,othr methods Right     Foot surgery Right 1986    Bunionectomy Bone spur R Foot    Fracture surgery Left 2015    elbow radial head replacement    Knee replacement surgery Bilateral 2017    Other surgical history Left     Head of the radius    Total knee replacement Right 3/20/17    DR. PATRICK    Total knee replacement Left 07/31/2017    DR. PATRICK       SOCIAL HISTORY:  Social History     Socioeconomic History    Marital status: Life Partner     Spouse name: Not on file    Number of children: Not on file    Years of education: Not on file    Highest education level: Not on file   Occupational History    Not on file   Tobacco Use    Smoking status: Never     Passive exposure: Never    Smokeless tobacco: Never   Vaping Use    Vaping status: Never Used   Substance and Sexual Activity    Alcohol use: Yes     Alcohol/week: 4.0 standard drinks of alcohol     Types: 4 Glasses of wine per week     Comment: social    Drug use: No    Sexual activity: Yes     Partners: Male, Female     Comment: wife   Other Topics Concern     Service Not Asked    Blood Transfusions Not Asked    Caffeine Concern Yes     Comment: 1 Cup Soda Daily    Occupational Exposure Not Asked    Hobby Hazards Not  Asked    Sleep Concern Not Asked    Stress Concern Not Asked    Weight Concern Not Asked    Special Diet Not Asked    Back Care Not Asked    Exercise No    Bike Helmet Not Asked    Seat Belt Not Asked    Self-Exams Not Asked   Social History Narrative    The patient does not use an assistive device..      The patient does live in a home with stairs.     Social Drivers of Health     Financial Resource Strain: Not on file   Food Insecurity: Not on file   Transportation Needs: Not on file   Physical Activity: Not on file   Stress: Not on file   Social Connections: Not on file   Housing Stability: Not on file         Depression Screening (PHQ-2/PHQ-9): Over the LAST 2 WEEKS   Little interest or pleasure in doing things (over the last two weeks)?: Not at all    Feeling down, depressed, or hopeless (over the last two weeks)?: Not at all    PHQ-2 SCORE: 0           FAMILY HISTORY:  Family History   Problem Relation Age of Onset    Other (Myocardial Infarction[other]) Brother     Psychiatric Father         alzheimer's    Heart Disorder Mother     Cancer Mother         lung    Other (Alzheimer's Disease[other]) Other         Family H    Heart Disease Other         Family H/O       MEDICATIONS:    Current Outpatient Medications:     estradiol (ESTRACE) 0.1 MG/GM Vaginal Cream, Place 1 g vaginally twice a week., Disp: 42.5 g, Rfl: 2    escitalopram 10 MG Oral Tab, , Disp: , Rfl:     moxifloxacin 0.5 % Ophthalmic Solution, , Disp: , Rfl:     prednisoLONE 1 % Ophthalmic Suspension, , Disp: , Rfl:     atorvastatin 40 MG Oral Tab, Take 1 tablet (40 mg total) by mouth daily., Disp: 90 tablet, Rfl: 3    triamcinolone 0.1 % External Ointment, Apply 1 Application  topically 2 (two) times daily as needed., Disp: 15 g, Rfl: 3    QUEtiapine Fumarate (SEROQUEL) 25 MG Oral Tab, Take 1 tablet (25 mg total) by mouth nightly., Disp: , Rfl: 0    fluticasone propionate 50 MCG/ACT Nasal Suspension, 2 sprays by Nasal route as needed., Disp: ,  Rfl:     ondansetron (ZOFRAN) 4 mg tablet, Take 1 tablet (4 mg total) by mouth every 8 (eight) hours as needed for Nausea. (Patient not taking: Reported on 10/10/2024), Disp: 20 tablet, Rfl: 0    ALLERGIES:  Allergies[1]      Review of Systems:  Constitutional:  Denies fatigue, night sweats, hot flashes  Eyes:  denies blurred or double vision  Cardiovascular:  denies chest pain or palpitations  Respiratory:  denies shortness of breath  Gastrointestinal:  denies heartburn, abdominal pain, diarrhea or constipation  Genitourinary:  denies dysuria, incontinence, abnormal vaginal discharge, vaginal itching,   Musculoskeletal:  denies back pain   Skin/Breast:  Denies any breast pain, lumps, or discharge.   Neurological:  denies headaches, extremity weakness or numbness.  Psychiatric: denies depression or anxiety.  Endocrine:   denies excessive thirst or urination. +weight loss  Heme/Lymph:  denies history of anemia, easy bruising or bleeding.      PHYSICAL EXAM:     Vitals:    10/10/24 1617   BP: 131/89   Pulse: 94   Weight: 152 lb (68.9 kg)   Height: 5' 6\" (1.676 m)       Body mass index is 24.53 kg/m².     Patient offered chaperone, patient declined    Constitutional: well developed, well nourished  Psychiatric:  Oriented to time, place, person and situation. Appropriate mood and affect  Head/Face: normocephalic  Neck/Thyroid: thyroid symmetric, no thyromegaly, no nodules, no adenopathy  Lymphatic:no abnormal supraclavicular or axillary adenopathy is noted  Breast: normal without palpable masses, tenderness, asymmetry, nipple discharge, nipple retraction or skin changes  Abdomen:  soft, nontender, nondistended, no masses  Skin/Hair: no unusual rashes or bruises  Extremities: no edema, no cyanosis    Pelvic Exam:  External Genitalia: normal appearance, hair distribution, and no lesions  Urethral Meatus:  normal in size, location, without lesions and prolapse  Bladder:  No fullness, masses or tenderness  Vagina: atrophic  Normal appearance without lesions, no abnormal discharge  Cervix:  Normal without tenderness on motion  Uterus: normal in size, contour, position, mobility, without tenderness  Adnexa: normal without masses or tenderness  Perineum: normal  Anus: no hemorroids     Assessment & Plan:    ICD-10-CM    1. Well woman exam with routine gynecological exam  Z01.419       2. Postmenopausal  Z78.0 XR DEXA BONE DENSITOMETRY (CPT=77080)      3. Screening for osteoporosis  Z13.820 XR DEXA BONE DENSITOMETRY (CPT=77080)      4. Vaginal atrophy  N95.2       5. Screening for cervical cancer  Z12.4 ThinPrep PAP Smear     Hpv Dna  High Risk , Thin Prep Collect     Hpv Dna  High Risk , Thin Prep Collect     ThinPrep PAP Smear         Reviewed ASCCP guidelines with the patient   Pap done today per patient request  Postmenopausal - call if any bleeding. Dexa ordered  Vaginal atrophy/dryness - start estrace cream vaginally 1 gram twice weekly  Weight loss - normal CT of abdomen and pelvis. Keep follow up with PCP and GI.  Breast Health:     Reviewed current guidelines with the patient and to start Mammograms at age 40  Reviewed monthly self breast exams with the patient   Discussed diet, exercise, MVIs with Ca/Vit D  Follow up in 1 yr for MARLEY Cleary    This note was prepared using Dragon Medical voice recognition dictation software. As a result errors may occur. When identified these errors have been corrected. While every attempt is made to correct errors during dictation discrepancies may still exist.         [1]   Allergies  Allergen Reactions    Penicillin G HIVES    Penicillins HIVES

## 2024-10-11 LAB — HPV E6+E7 MRNA CVX QL NAA+PROBE: NEGATIVE

## 2024-10-13 ENCOUNTER — HOSPITAL ENCOUNTER (OUTPATIENT)
Dept: CT IMAGING | Age: 69
Discharge: HOME | End: 2024-10-13
Attending: INTERNAL MEDICINE

## 2024-10-13 DIAGNOSIS — Z00.00 ENCOUNTER FOR ANNUAL HEALTH EXAMINATION: ICD-10-CM

## 2024-10-13 DIAGNOSIS — R73.03 BORDERLINE DIABETES: ICD-10-CM

## 2024-10-13 DIAGNOSIS — E78.2 MIXED HYPERLIPIDEMIA: ICD-10-CM

## 2024-10-14 ENCOUNTER — OFFICE VISIT (OUTPATIENT)
Dept: PHYSICAL THERAPY | Facility: HOSPITAL | Age: 69
End: 2024-10-14
Attending: NURSE PRACTITIONER
Payer: MEDICARE

## 2024-10-14 ENCOUNTER — OFFICE VISIT (OUTPATIENT)
Dept: HEMATOLOGY/ONCOLOGY | Facility: HOSPITAL | Age: 69
End: 2024-10-14
Attending: INTERNAL MEDICINE

## 2024-10-14 ENCOUNTER — TELEPHONE (OUTPATIENT)
Dept: HEMATOLOGY/ONCOLOGY | Facility: HOSPITAL | Age: 69
End: 2024-10-14

## 2024-10-14 VITALS
BODY MASS INDEX: 24.64 KG/M2 | OXYGEN SATURATION: 99 % | RESPIRATION RATE: 18 BRPM | TEMPERATURE: 98 F | WEIGHT: 153.31 LBS | HEART RATE: 96 BPM | DIASTOLIC BLOOD PRESSURE: 93 MMHG | SYSTOLIC BLOOD PRESSURE: 158 MMHG | HEIGHT: 66 IN

## 2024-10-14 DIAGNOSIS — F41.1 GAD (GENERALIZED ANXIETY DISORDER): ICD-10-CM

## 2024-10-14 DIAGNOSIS — R63.4 UNINTENTIONAL WEIGHT LOSS: Primary | ICD-10-CM

## 2024-10-14 PROCEDURE — 99204 OFFICE O/P NEW MOD 45 MIN: CPT | Performed by: INTERNAL MEDICINE

## 2024-10-14 PROCEDURE — 97140 MANUAL THERAPY 1/> REGIONS: CPT

## 2024-10-14 PROCEDURE — 97110 THERAPEUTIC EXERCISES: CPT

## 2024-10-14 NOTE — PROGRESS NOTES
Diagnosis:   Spinal stenosis of lumbar region with neurogenic claudication (M48.062)  Lumbar spondylosis (M47.816)        Referring Provider: Samara Cui  Date of Evaluation:    8/30/2024     Precautions:   Anxiety, Osteoarthritis Next MD visit:   none scheduled  Date of Surgery: n/a   Insurance Primary/Secondary: [employer] MCR / N/A     # Auth Visits: no auth, 10 per POC            Subjective: Has noticed that in the last 3-4 days her back has been feeling better; was able to mow the lawn today without taking Advil.   Pain: 2-3/10       Objective:     9/23/2024:   Standing: inferior R PSIS       9/20/2024:   Elevated R medial Malleolus, iliac crest, symmetrical ASIS  Decreased flexibility R Quadratus Lumborum      Lumbar AROM: (* denotes performed with pain)  Evaluation   Flexion: 82*   Extension: 15  Sidebending: R to knee*; L grossly 50%*   Rotation: R WFL; L WFL        Accessory motion: Hypomobility with radicular sx at lower lumbar spine, central P-A and L3-L5 R unilateral PA  Palpation: TTP throughout lumbar spine, lumbar paraspinals      Strength: (* denotes performed with pain)  LE   Hip flexion (L2): R 4/5; L 5/5  Hip abduction: R 4-*/5; L 4*/5  Hip Extension: R 4*/5; L 4+/5            Hip ER: R 4+/5; L 4+/5  Hip IR: R 4+/5; L 4+/5  Knee Flexion: R 5/5; L 5/5            Knee extension (L3): R 5/5; L 5/5            DF (L4): R 5/5; L 5/5         Flexibility:   LE   Hip Flexor: R minimal restriction, L WFL  Hamstrings: Not Tested  Piriformis: Not Tested      Special tests:   Lumbar Quadrant Testing: + for sx reproduction with R/L extension plane (L>R)   Slump: Not Tested  SLR: Not Tested      Gait: pt ambulates on level ground with  decreased hip extension bilaterally, antalgia, and decreased trunk rotation  .      Assessment: Continues to demo anteriorly rotated R innominate, addressed with METs in session followed by posterior chain strength training, tolerated well. Pt participates in increased strength  training in session without pain, tentative plan to DC next session with HEP compliance and review.      Goals: (to be met in 10 visits)   Pt will improve transversus abdominis recruitment to perform proper isometric contraction without requiring verbal or tactile cuing to promote advancement of therex   Pt will demonstrate good understanding of proper posture and body mechanics to decrease pain and improve spinal safety   Pt will improve lumbar spine AROM flexion to >85 deg pain free to allow increase ease with bending forward to don shoes   Pt will improve lumbar extension AROM   Pt will report improved symptom centralization and absence of radicular symptoms for 3 consecutive days to improve function with ADL   Pt will have decreased paraspinal mm tension to tolerate performing supine<>sit transfers.    Pt will demonstrate improved core strength to be able to mow the lawn with <3/10 pain   Pt will be independent and compliant with comprehensive HEP to maintain progress achieved in PT      Oswestry Disability Index Score  Score: 32 % (8/28/2024  6:19 PM)    Plan: Patient will be seen for 1-2 x/week or a total of 10 visits over a 90 day period. Treatment will include: Gait training, Manual Therapy, Neuromuscular Re-education, Self-Care Home Management, Therapeutic Activities, Therapeutic Exercise, and Home Exercise Program instruction   Future Session Consider: Reassess with HEP update and review  Date:  9/13/2024             TX#: 3/10 Date:  9/16/2024                TX#: 4/10 Date:  9/20/2024                TX#: 5/10 Date: 9/23/2024   Tx#: 6/10 Date: 9/27/2024   Tx: 7/10  Date: 9/30/2024   Tx: 8/10 Date: 10/14/2024   Tx: 9/10   Manual: 15'   S/l STM lumbar/ low thoracic paraspinals (R)  L s/l R facet opening (sx relief)    Manual: 15'   S/L STM lumbar/ low thoracic paraspinals (R)  L s/l R facet opening, gapping (sx relief)  Manual: 20'   Attempting R long Axis distraction with quick stretch (inc pain)  L s/l R  facet opening, gapping, low amplitude  S/L QL stretch with OP   Manual: 15'   Quadratus lumborum OP, L s/l   METS- anterior innominate rotation bias (R hamstring, L hip flexor)  Manual: 15'   Quadratus lumborum OP, L s/l   STM posterior capsule, S/L   METS- anterior innominate rotation bias (R hamstring, L hip flexor)    Manual: 12'  Quadratus lumborum OP, L s/l   STM posterior capsule, S/L   METS- anterior innominate rotation bias (R hamstring, L hip flexor)    Manual: 15'  METS- anterior innominate rotation bias (R hamstring, L hip flexor)   METS- abd/add with quick stretch, H/L  Short axis distraction, R hip   TE: 30  Seated thoracic extension on 1/2 foam (inc pain) x10  S/L open book, R/L x10ea   H/L piriformis stretch 4x20s R/L   H/L clamshell--> S/L GTB R/L x15ea (15 supine R)   Seated SB roll, lumbar flexion, L  x10 ea   Standing hip extension on slider x20 R/L    TE: 25'   R trunk lean at wall x15 (sx relieving)   S/L open book, R x10ea   H/L piriformis stretch 4x20s R  DKTC x20  Crunch to SB, H/L x20, 5s hold   S/L clamshell, RTB x20ea R/L   RDL, small range, 2# cane x15     TE: 22'  QL stretch, S/L 3'   S/L open book, R x15  Seated SB roll, lumbar flexion, L  x10 ea   Passive hip flexion, stretch   Pt edu- anatomy, posturing with visual aide  TE: 25'   Latissimus dorsi stretch, at wall, R x15 (inc sx)   QL stretch, S/L 3'   Standing R hip hike, 4x5, small range  SB DKTC x30  Standing TrA activation to SB on table x15, 5s hold      TE: 30'    S/L clamshell, x20    Bridge, GTB into abduction, 2x10 (inc pain)  QL stretch on wall (lateral lean) 2x10   Standing R hamstring curl 2# 2x10  Standing Hip extension, R/L 2# x20ea   Standing R hip hike, 4x5, small range  Double Leg press, 67# 2x10  Single Leg press, 50# 2x10 TE: 30'  Seated SB flexion, x12, 5s hold  Attempting L heel lift, unsuccessful  H/L piriformis stretch 5x20s  S/L clamshell x20  Sahrmann L2 Up/up/down/down 2x10ea  Standing shoulder extension, GTB  x20  Pallof press, Blue TB x15 ea  Modified Bird Dog on elevated plinth, 2x10 ea  TE: 30'  Standing hip extension on slider, 2x10  Standing hip extension, x10ea   Standing R hip hike, 4x5, small range  Step Up 6in step up, R/L 0UE x15 ea  Child's pose, x10, 10s hold  Quadruped hip extension, 2x5 ea   Seated hamstring curls R/L 2x10 ea GTB                         HEP: Access Code: [email or web address]  URL: [email or web address]  Date: 09/16/2024  Prepared by: Fawn Ford    Exercises  - Sidelying Thoracic Rotation with Open Book  - 1 x daily - 7 x weekly - 2 sets - 10 reps - 5 second  hold  - Seated Flexion Stretch with Swiss Ball  - 1 x daily - 7 x weekly - 2 sets - 10 reps  - Standing Sidebends  - 1 x daily - 7 x weekly - 2 sets - 10 reps - 5 second hold  - Supine Posterior Pelvic Tilt  - 1 x daily - 7 x weekly - 2 sets - 10 reps - 5 second hold    Charges: 1 Manual, 2 TE       Total Timed Treatment: 45 ' min  Total Treatment Time: 45 ' min

## 2024-10-14 NOTE — TELEPHONE ENCOUNTER
Called  manish Guillory, per Dr. Nash all recent labs were all normal, no further testing is indicated, she can continue regular follow up with her PCP. Left number for her to call back if she had any questions.

## 2024-10-14 NOTE — CONSULTS
[facility] Hematology  Consultation Note    Patient Name: Pastora Cavazos   YOB: 1955   Medical Record Number: [id number]   CSN: [id number]   Consulting Physician: Carlos Alberto Nash MD  Referring Physician(s): Dr. Ana Babcock  Date of Consultation: 10/14/2024     Reason for Consultation:    1. Unintentional weight loss    2. TRAY (generalized anxiety disorder)      History of Present Illness:   Pastora Cavazos is a 69 year old female seen today in the Hematology/Oncology Clinic  for unintentional weight loss.     She has apparently lost about 4 to 5 pounds in the last month without trying.  She has been under more stress recently, her cat  recently and she is in the process of moving.  She does have a history of generalized anxiety disorder and has previously been on Lexapro for several years.  Most recently started as needed clonazepam.  Seeing her therapist twice a week.  She has also been doing more physical therapy lately.  She had labs done 2024 including a CBC and a CMP and a thyroid panel that was all normal.    2024 CT abdomen pelvis showed no evidence of cancer in the abdomen/pelvis.    CT calcium scoring performed 10/13/2024 showed no lung nodules or any other pulmonary abnormality or mediastinal adenopathy.    2024 colonoscopy showed multiple polyps that were all benign.  There are fragments of sessile serrated adenoma and a tubular adenoma.  For which a follow-up colonoscopy is recommended in 3 years.    She is otherwise up-to-date on health screening including mammogram on 3/24 that showed no evidence of malignancy.    There is a family history of lung cancer in her mom but no other first-degree relatives with cancer. Brother was recently dxed with a superficial melanoma      Past Medical History:  Past Medical History:    Anxiety    Anxiety state    Disorder of thyroid    HYPOTHYROID    Elbow fracture, left    GERD (gastroesophageal reflux disease)    High cholesterol     Hyperlipidemia    Medial meniscus tear        Neck pain    neck stenosis    OCD (obsessive compulsive disorder)    Osteoarthritis    Pathological dislocation of right upper arm    Sinusitis       Past Surgical History:  Past Surgical History:   Procedure Laterality Date    Arthroscopy of joint unlisted Left     knee    Arthroscopy of joint unlisted Right     knee    Colonoscopy N/A 2021    Procedure: COLONOSCOPY;  Surgeon: Rudi Reinoso MD;  Location: [facility]    Colonoscopy N/A 9/10/2024    Procedure: COLONOSCOPY;  Surgeon: Rudi Reinoso MD;  Location: [facility] END[facility]    Correct bunion,othr methods Right     Foot surgery Right     Bunionectomy Bone spur R Foot    Fracture surgery Left     elbow radial head replacement    Knee replacement surgery Bilateral 2017    Other surgical history Left     Head of the radius    Total knee replacement Right 3/20/17    DR. PATRICK    Total knee replacement Left 2017    DR. PATRICK       Family Medical History:  Family History   Problem Relation Age of Onset    Other (Myocardial Infarction[other]) Brother     Psychiatric Father         alzheimer's    Heart Disorder Mother     Cancer Mother         lung    Other (Alzheimer's Disease[other]) Other         Family H    Heart Disease Other         Family H/O       Gyne History:  OB History    Para Term  AB Living   0 0 0 0 0 0   SAB IAB Ectopic Multiple Live Births   0 0 0 0 0       Social History:  Social History     Socioeconomic History    Marital status: Life Partner     Spouse name: Not on file    Number of children: Not on file    Years of education: Not on file    Highest education level: Not on file   Occupational History    Not on file   Tobacco Use    Smoking status: Never     Passive exposure: Never    Smokeless tobacco: Never   Vaping Use    Vaping status: Never Used   Substance and Sexual Activity    Alcohol use: Yes     Alcohol/week: 4.0 standard drinks of alcohol     Types: 4  Glasses of wine per week     Comment: social    Drug use: No    Sexual activity: Yes     Partners: Male, Female     Comment: wife   Other Topics Concern     Service Not Asked    Blood Transfusions Not Asked    Caffeine Concern Yes     Comment: 1 Cup Soda Daily    Occupational Exposure Not Asked    Hobby Hazards Not Asked    Sleep Concern Not Asked    Stress Concern Not Asked    Weight Concern Not Asked    Special Diet Not Asked    Back Care Not Asked    Exercise No    Bike Helmet Not Asked    Seat Belt Not Asked    Self-Exams Not Asked   Social History Narrative    The patient does not use an assistive device..      The patient does live in a home with stairs.     Social Drivers of Health     Financial Resource Strain: Not on file   Food Insecurity: Not on file   Transportation Needs: Not on file   Physical Activity: Not on file   Stress: Not on file   Social Connections: Not on file   Housing Stability: Not on file       Allergies:   Allergies[1]    Current Medications:   escitalopram 10 MG Oral Tab       moxifloxacin 0.5 % Ophthalmic Solution       prednisoLONE 1 % Ophthalmic Suspension       atorvastatin 40 MG Oral Tab Take 1 tablet (40 mg total) by mouth daily. 90 tablet 3    triamcinolone 0.1 % External Ointment Apply 1 Application  topically 2 (two) times daily as needed. 15 g 3    QUEtiapine Fumarate (SEROQUEL) 25 MG Oral Tab Take 1 tablet (25 mg total) by mouth nightly.  0    fluticasone propionate 50 MCG/ACT Nasal Suspension 2 sprays by Nasal route as needed.         Review of Systems:    Pertinent positives and negatives noted in the the HPI.     Physical Examination:  BP (!) 158/93 (BP Location: Right arm, Patient Position: Sitting, Cuff Size: adult)   Pulse 96   Temp 97.9 °F (36.6 °C) (Oral)   Resp 18   Ht 1.676 m (5' 6\")   Wt 69.5 kg (153 lb 4.8 oz)   SpO2 99%   BMI 24.74 kg/m²     General: Patient is alert and oriented x 3, not in acute distress.  HEENT: EOMs intact. PERRL. Oropharynx is  clear.   Neck: No JVD. No palpable lymphadenopathy. Neck is supple.  Lymphatics: There is no palpable lymphadenopathy throughout in the cervical, supraclavicular, axillary, or inguinal regions.  Chest: Clear to auscultation. No wheezes or rales.  Heart: Regular rate and rhythm. S1S2 normal.  Abdomen: Soft, non tender.  No hepatosplenomegaly.  No palpable mass.  Extremities: No edema or calf tenderness.  Neurological: Grossly intact.       Labs:    Lab Results   Component Value Date/Time    WBC 4.7 07/10/2024 01:04 PM    RBC 4.17 07/10/2024 01:04 PM    HGB 13.7 07/10/2024 01:04 PM    HCT 39.2 07/10/2024 01:04 PM    MCV 94.0 07/10/2024 01:04 PM    MCH 32.9 07/10/2024 01:04 PM    MCHC 34.9 07/10/2024 01:04 PM    RDW 11.9 07/10/2024 01:04 PM    NEPRELIM 2.08 10/14/2023 03:21 PM    .0 07/10/2024 01:04 PM       Impression:  Diagnosis  1. Unintentional weight loss    2. TRAY (generalized anxiety disorder)      She has had an extensive workup including a CT abdomen pelvis as well as a CT chest that shows no evidence of malignancy.  A colonoscopy and MMG earlier this year were enign    Plan:  1.  Repeat labs including a CBC/CMP/LDH and thyroid studies  2.  If these labs are unremarkable then a primary neoplastic disorder causing her weight loss is unlikely.  3.  Would recommend she regroup with her primary care physician to evaluate for other alternative etiologies for weight loss.  She is been under more stress recently that may be contributory, and I encouraged her to continue her SSRI and PRN clonazepam  4. No further oncologic workup is indicated      Thank you Dr Ana Babcock MD for the opportunity to participate in the care of this interesting patient. Please do contact me if I may be of any further assistance    Carlos Alberto Nash MD  [facility] Hematology/Oncology           [1]   Allergies  Allergen Reactions    Penicillin G HIVES    Penicillins HIVES

## 2024-11-07 ENCOUNTER — OFFICE VISIT (OUTPATIENT)
Dept: PHYSICAL THERAPY | Facility: HOSPITAL | Age: 69
End: 2024-11-07
Attending: NURSE PRACTITIONER
Payer: MEDICARE

## 2024-11-07 PROCEDURE — 97110 THERAPEUTIC EXERCISES: CPT

## 2024-11-07 NOTE — PROGRESS NOTES
Discharge Summary  Pt has attended 10 visits in Physical Therapy.     Diagnosis:   Spinal stenosis of lumbar region with neurogenic claudication (M48.062)  Lumbar spondylosis (M47.816)        Referring Provider: Samara Cui  Date of Evaluation:    8/30/2024     Precautions:   Anxiety, Osteoarthritis Next MD visit:   none scheduled  Date of Surgery: n/a   Insurance Primary/Secondary: [employer] [facility] / N/A     # Auth Visits: no auth, 10 per POC            Subjective: Has been out of therapy for a month, overall has been a little more achy in the low back rather than in SIJ. Does feel that her side to side movement has been a lot better.      Pain: 0 /10       Assessment: Patient has attended 10 visits in OP physical therapy to address R hip and B low back pain; at this time patient demonstrates notable improvement in lumbar ROM, improved BLE strength, adequate single leg stability, and subjectively reports decreased pain levels. Pt endorses comprehensive and helpful HEP with stretching, she will be down in [place] for the winter and will participate in exercise classes at that time to maintain improvements seen in therapy. Patient will be discharged with continued HEP to work independently, all questions answered at this time.        Objective:     Single Leg Stance 11/7/2024): R 30+ sec; L 30+ sec    Lumbar AROM: (* denotes performed with pain)  11/7/2024  Evaluation   Flexion: 95   Extension: 30   Sidebending: R 100%; L 85% (mild R pain)     Flexion: 82*   Extension: 15  Sidebending: R to knee*; L grossly 50%*   Rotation: R WFL; L WFL        Strength: (* denotes performed with pain)  11/7/2024   LE   Hip flexion (L2): R 5/5; L 5/5  Hip abduction: R 4/5; L 4+/5  Hip Extension: R 4+/5; L 5/5            Hip ER: R 5/5; L 5/5  Hip IR: R 5/5; L 5/5 Hip flexion (L2): R 4/5; L 5/5  Hip abduction: R 4-*/5; L 4*/5  Hip Extension: R 4*/5; L 4+/5            Hip ER: R 4+/5; L 4+/5  Hip IR: R 4+/5; L 4+/5  Knee Flexion: R 5/5; L  5/5            Knee extension (L3): R 5/5; L 5/5            DF (L4): R 5/5; L 5/5           Gait: pt ambulates on level ground with  decreased hip extension bilaterally, antalgia, and decreased trunk rotation  .   11/7/2024 Improving symmetry at B shoulder and hip with gait mechanics      Goals: (to be met in 10 visits)   Pt will improve transversus abdominis recruitment to perform proper isometric contraction without requiring verbal or tactile cuing to promote advancement of therex Met   Pt will demonstrate good understanding of proper posture and body mechanics to decrease pain and improve spinal safety Met  Pt will improve lumbar spine AROM flexion to >85 deg pain free to allow increase ease with bending forward to don shoes Met   Pt will improve lumbar extension AROM Met    Pt will report improved symptom centralization and absence of radicular symptoms for 3 consecutive days to improve function with ADL Continued with HEP  Pt will have decreased paraspinal mm tension to tolerate performing supine<>sit transfers.  Met inconsistently   Pt will demonstrate improved core strength to be able to mow the lawn with <3/10 pain Met   Pt will be independent and compliant with comprehensive HEP to maintain progress achieved in PT Met      Oswestry Disability Index Score  Score: 32 % (8/28/2024  6:19 PM)    Post Oswestry Disability Index Score  Post Score: (Patient-Rptd) 24 % (11/7/2024  4:53 PM)    8 % improvement    Plan: D/C with HEP compliance and follow up when pt returns from [place]    Patient/Family/Caregiver was advised of these findings, precautions, and treatment options and has agreed to actively participate in planning and for this course of care.    Thank you for your referral. If you have any questions, please contact me at Dept: [phone].    Sincerely,  Electronically signed by therapist: Fawn Ford, PT     Physician's certification required:  No  Please co-sign or sign and return this letter via  fax as soon as possible to [phone].   I certify the need for these services furnished under this plan of treatment and while under my care.    X___________________________________________________ Date____________________    Certification From: 11/7/2024  To:2/5/2025     Date:  9/16/2024                TX#: 4/10 Date:  9/20/2024                TX#: 5/10 Date: 9/23/2024   Tx#: 6/10 Date: 9/27/2024   Tx: 7/10  Date: 9/30/2024   Tx: 8/10 Date: 10/14/2024   Tx: 9/10 Date: 11/7/2024   Tx: 10/10   Manual: 15'   S/L STM lumbar/ low thoracic paraspinals (R)  L s/l R facet opening, gapping (sx relief)  Manual: 20'   Attempting R long Axis distraction with quick stretch (inc pain)  L s/l R facet opening, gapping, low amplitude  S/L QL stretch with OP   Manual: 15'   Quadratus lumborum OP, L s/l   METS- anterior innominate rotation bias (R hamstring, L hip flexor)  Manual: 15'   Quadratus lumborum OP, L s/l   STM posterior capsule, S/L   METS- anterior innominate rotation bias (R hamstring, L hip flexor)    Manual: 12'  Quadratus lumborum OP, L s/l   STM posterior capsule, S/L   METS- anterior innominate rotation bias (R hamstring, L hip flexor)    Manual: 15'  METS- anterior innominate rotation bias (R hamstring, L hip flexor)   METS- abd/add with quick stretch, H/L  Short axis distraction, R hip    TE: 25'   R trunk lean at wall x15 (sx relieving)   S/L open book, R x10ea   H/L piriformis stretch 4x20s R  DKTC x20  Crunch to SB, H/L x20, 5s hold   S/L clamshell, RTB x20ea R/L   RDL, small range, 2# cane x15     TE: 22'  QL stretch, S/L 3'   S/L open book, R x15  Seated SB roll, lumbar flexion, L  x10 ea   Passive hip flexion, stretch   Pt edu- anatomy, posturing with visual aide  TE: 25'   Latissimus dorsi stretch, at wall, R x15 (inc sx)   QL stretch, S/L 3'   Standing R hip hike, 4x5, small range  SB DKTC x30  Standing TrA activation to SB on table x15, 5s hold      TE: 30'    S/L clamshell, x20    Bridge, GTB into  abduction, 2x10 (inc pain)  QL stretch on wall (lateral lean) 2x10   Standing R hamstring curl 2# 2x10  Standing Hip extension, R/L 2# x20ea   Standing R hip hike, 4x5, small range  Double Leg press, 67# 2x10  Single Leg press, 50# 2x10 TE: 30'  Seated SB flexion, x12, 5s hold  Attempting L heel lift, unsuccessful  H/L piriformis stretch 5x20s  S/L clamshell x20  Sahrmann L2 Up/up/down/down 2x10ea  Standing shoulder extension, GTB x20  Pallof press, Blue TB x15 ea  Modified Bird Dog on elevated plinth, 2x10 ea  TE: 30'  Standing hip extension on slider, 2x10  Standing hip extension, x10ea   Standing R hip hike, 4x5, small range  Step Up 6in step up, R/L 0UE x15 ea  Child's pose, x10, 10s hold  Quadruped hip extension, 2x5 ea   Seated hamstring curls R/L 2x10 ea GTB     TE: 30'    Reassessment- strength, ROM, SLS   Seated SB lumbar flexion x15  Pt edu- D/C planning, continuation of exercise, HEP review                        HEP: Access Code: [email or web address]  URL: [email or web address]  Date: 09/16/2024  Prepared by: Fawn Ford    Exercises  - Sidelying Thoracic Rotation with Open Book  - 1 x daily - 7 x weekly - 2 sets - 10 reps - 5 second  hold  - Seated Flexion Stretch with Swiss Ball  - 1 x daily - 7 x weekly - 2 sets - 10 reps  - Standing Sidebends  - 1 x daily - 7 x weekly - 2 sets - 10 reps - 5 second hold  - Supine Posterior Pelvic Tilt  - 1 x daily - 7 x weekly - 2 sets - 10 reps - 5 second hold    Charges: 2 TE       Total Timed Treatment: 30 ' min  Total Treatment Time: 30  ' min

## 2024-11-27 ENCOUNTER — MED REC SCAN ONLY (OUTPATIENT)
Dept: INTERNAL MEDICINE CLINIC | Facility: CLINIC | Age: 69
End: 2024-11-27

## 2024-12-05 DIAGNOSIS — F33.0 MILD RECURRENT MAJOR DEPRESSION (HCC): ICD-10-CM

## 2024-12-10 RX ORDER — ATORVASTATIN CALCIUM 40 MG/1
40 TABLET, FILM COATED ORAL DAILY
Qty: 90 TABLET | Refills: 3 | Status: SHIPPED | OUTPATIENT
Start: 2024-12-10

## 2024-12-10 NOTE — TELEPHONE ENCOUNTER
Refill Per Protocol     Requested Prescriptions   Pending Prescriptions Disp Refills    ATORVASTATIN 40 MG Oral Tab [Pharmacy Med Name: ATORVASTATIN 40MG TABLETS] 90 tablet 3     Sig: TAKE 1 TABLET(40 MG) BY MOUTH DAILY       Cholesterol Medication Protocol Passed - 12/10/2024  4:03 PM        Passed - ALT < 80     Lab Results   Component Value Date    ALT 25 10/14/2024             Passed - ALT resulted within past year        Passed - Lipid panel within past 12 months     Lab Results   Component Value Date    CHOLEST 207 (H) 07/10/2024    TRIG 104 07/10/2024    HDL 63 (H) 07/10/2024     (H) 07/10/2024    VLDL 18 07/10/2024    NONHDLC 144 (H) 07/10/2024             Passed - In person appointment or virtual visit in the past 12 mos or appointment in next 3 mos     Recent Outpatient Visits              1 month ago     [facility] Fawn Ford, PT    Office Visit    1 month ago Unintentional weight loss    [facility] Oncology Carlos Alberto Nash MD    Office Visit    1 month ago     [facility] Fawn Ford, PT    Office Visit    2 months ago Well woman exam with routine gynecological exam    [facility]/Kathleen Singh APRN    Office Visit    2 months ago Seasonal [facility] rhinitis, [facility] trigger    [facility]    Office Visit                               Recent Outpatient Visits              1 month ago     [facility] Fawn Ford, PT    Office Visit    1 month ago Unintentional weight loss    [facility] Oncology Carlos Alberto Nash MD    Office Visit    1 month ago     [facility] Fa[facility], PT    Office Visit    2 months ago Well woman exam with routine gynecological exam    [facility]/Kathleen Singh APRN     Office Visit    2 months ago Seasonal allergic rhinitis, unspecified trigger    [facility] [place] Onleia Patiño MD    Office Visit

## 2025-04-18 ENCOUNTER — PATIENT MESSAGE (OUTPATIENT)
Dept: INTERNAL MEDICINE CLINIC | Facility: CLINIC | Age: 70
End: 2025-04-18

## 2025-04-18 DIAGNOSIS — Z12.31 ENCOUNTER FOR MAMMOGRAM TO ESTABLISH BASELINE MAMMOGRAM: Primary | ICD-10-CM

## 2025-04-21 NOTE — TELEPHONE ENCOUNTER
Mammorgram generated as per protocol.  Last office visit 10/04/24.  Last normal mammogram 03/05/24.

## 2025-05-13 ENCOUNTER — HOSPITAL ENCOUNTER (OUTPATIENT)
Dept: MAMMOGRAPHY | Age: 70
Discharge: HOME | End: 2025-05-13
Attending: INTERNAL MEDICINE
Payer: MEDICARE

## 2025-05-13 DIAGNOSIS — Z12.31 ENCOUNTER FOR MAMMOGRAM TO ESTABLISH BASELINE MAMMOGRAM: ICD-10-CM

## 2025-05-13 PROCEDURE — 77067 SCR MAMMO BI INCL CAD: CPT | Performed by: INTERNAL MEDICINE

## 2025-05-13 PROCEDURE — 77063 BREAST TOMOSYNTHESIS BI: CPT | Performed by: INTERNAL MEDICINE

## 2025-08-09 ENCOUNTER — OFFICE VISIT (OUTPATIENT)
Dept: INTERNAL MEDICINE CLINIC | Facility: CLINIC | Age: 70
End: 2025-08-09

## 2025-08-09 VITALS
WEIGHT: 155 LBS | HEIGHT: 66 IN | DIASTOLIC BLOOD PRESSURE: 82 MMHG | BODY MASS INDEX: 24.91 KG/M2 | HEART RATE: 76 BPM | SYSTOLIC BLOOD PRESSURE: 122 MMHG | TEMPERATURE: 97 F

## 2025-08-09 DIAGNOSIS — Z82.0 FAMILY HISTORY OF ALZHEIMER'S DISEASE: ICD-10-CM

## 2025-08-09 DIAGNOSIS — M54.12 CERVICAL RADICULITIS: ICD-10-CM

## 2025-08-09 DIAGNOSIS — Z13.820 SCREENING FOR OSTEOPOROSIS: ICD-10-CM

## 2025-08-09 DIAGNOSIS — F41.1 GAD (GENERALIZED ANXIETY DISORDER): ICD-10-CM

## 2025-08-09 DIAGNOSIS — M17.12 PRIMARY OSTEOARTHRITIS OF LEFT KNEE: ICD-10-CM

## 2025-08-09 DIAGNOSIS — E55.9 VITAMIN D DEFICIENCY: ICD-10-CM

## 2025-08-09 DIAGNOSIS — Z78.0 ASYMPTOMATIC MENOPAUSAL STATE: ICD-10-CM

## 2025-08-09 DIAGNOSIS — Z12.31 SCREENING MAMMOGRAM, ENCOUNTER FOR: ICD-10-CM

## 2025-08-09 DIAGNOSIS — R73.03 BORDERLINE DIABETES: ICD-10-CM

## 2025-08-09 DIAGNOSIS — Z00.00 ENCOUNTER FOR ANNUAL HEALTH EXAMINATION: Primary | ICD-10-CM

## 2025-08-09 DIAGNOSIS — F33.0 MILD RECURRENT MAJOR DEPRESSION: Chronic | ICD-10-CM

## 2025-08-09 DIAGNOSIS — E78.2 MIXED HYPERLIPIDEMIA: ICD-10-CM

## 2025-08-09 DIAGNOSIS — Z91.89 AT RISK FOR DECREASED BONE DENSITY: ICD-10-CM

## 2025-08-09 DIAGNOSIS — M17.11 PRIMARY OSTEOARTHRITIS OF RIGHT KNEE: ICD-10-CM

## 2025-08-09 DIAGNOSIS — Z96.653 HISTORY OF BILATERAL KNEE REPLACEMENT: ICD-10-CM

## 2025-08-09 PROBLEM — R63.4 UNINTENTIONAL WEIGHT LOSS: Status: RESOLVED | Noted: 2024-10-14 | Resolved: 2025-08-09

## (undated) DEVICE — PROXIMATE SKIN STAPLERS (35 WIDE) CONTAINS 35 STAINLESS STEEL STAPLES (FIXED HEAD): Brand: PROXIMATE

## (undated) DEVICE — Device

## (undated) DEVICE — SUTURE VICRYL 2-0 FS-1

## (undated) DEVICE — MEDI-VAC NON-CONDUCTIVE SUCTION TUBING 6MM X 1.8M (6FT.) L: Brand: CARDINAL HEALTH

## (undated) DEVICE — BLADE SW .5IN MCHC 2.75IN

## (undated) DEVICE — CONTAINER SPEC STR 4OZ GRY LID

## (undated) DEVICE — T5 HOOD WITH PEEL AWAY FACE SHIELD

## (undated) DEVICE — FORCEP RADIAL JAW 4

## (undated) DEVICE — SOL  .9 1000ML BTL

## (undated) DEVICE — SUTURE VICRYL 1-0 J977H

## (undated) DEVICE — FAN SPRAY KIT: Brand: PULSAVAC®

## (undated) DEVICE — GOWN SURG AERO BLUE PERF LG

## (undated) DEVICE — DRESSING SILVERLON ISLAND 13IN

## (undated) DEVICE — CHLORAPREP 26ML APPLICATOR

## (undated) DEVICE — DUAL CUT SAGITTAL BLADE

## (undated) DEVICE — SNARE OPTMZ PLPCTM TRP

## (undated) DEVICE — Device: Brand: DEFENDO AIR/WATER/SUCTION AND BIOPSY VALVE

## (undated) DEVICE — KIT MFLD FOR SPEC COLL

## (undated) DEVICE — BATTERY

## (undated) DEVICE — BOWL CEMENT MIX QUICK-VAC

## (undated) DEVICE — DISPOSABLE DRILL/PIN SET

## (undated) DEVICE — KIT ENDO ORCAPOD 160/180/190

## (undated) DEVICE — SYRINGE, LUER SLIP, STERILE, 60ML: Brand: MEDLINE

## (undated) DEVICE — COVER SGL STRL LGHT HNDL BLU

## (undated) DEVICE — LASSO POLYPECTOMY SNARE: Brand: LASSO

## (undated) DEVICE — SOL  .9 3000ML

## (undated) DEVICE — 3M™ IOBAN™ 2 ANTIMICROBIAL INCISE DRAPE 6640EZ: Brand: IOBAN™ 2

## (undated) DEVICE — LINE MNTR ADLT SET O2 INTMD

## (undated) DEVICE — POLAR CARE CUBE COOLING UNIT

## (undated) DEVICE — CLIPPER BLADE 3M

## (undated) DEVICE — CO2 CANNULA,SSOFT,ADLT,7O2,4CO2,FEMALE: Brand: MEDLINE

## (undated) DEVICE — V2 SPECIMEN COLLECTION TRAY: Brand: NEPTUNE

## (undated) DEVICE — Device: Brand: POWER-FLO®

## (undated) DEVICE — PADDING 4YDX6IN CTTN STRL WBRL

## (undated) DEVICE — TOTAL KNEE: Brand: MEDLINE INDUSTRIES, INC.

## (undated) DEVICE — SNARE ENDOSCOPIC 10MM ROUND

## (undated) DEVICE — Device: Brand: CUSTOM PROCEDURE KIT

## (undated) DEVICE — NEEDLE HPO 18GA 1.5IN ECLPS

## (undated) DEVICE — STERILE LATEX POWDER-FREE SURGICAL GLOVESWITH NITRILE COATING: Brand: PROTEXIS

## (undated) DEVICE — KIT CLEAN ENDOKIT 1.1OZ GOWNX2

## (undated) DEVICE — BLADE SAW SAGITTAL 19.5

## (undated) DEVICE — FLEXIBLE YANKAUER,MEDIUM TIP, NO VACUUM CONTROL: Brand: ARGYLE

## (undated) DEVICE — PSI PERSONA PS PIN GDE FEM-TIB

## (undated) DEVICE — ZIMMER® STERILE DISPOSABLE TOURNIQUET CUFF WITH PLC, DUAL PORT, SINGLE BLADDER, 34 IN. (86 CM)

## (undated) DEVICE — DRAPE SHEET LG

## (undated) DEVICE — 25MM TIBIAL FEMALE HEX

## (undated) DEVICE — ZIMMER® STERILE DISPOSABLE TOURNIQUET CUFF WITH PLC, DUAL PORT, SINGLE BLADDER, 30 IN. (76 CM)

## (undated) NOTE — LETTER
02/27/20        Dez Disla      Dear Raimundo Muñiz,    Our records indicate that you have outstanding lab work and or testing that was ordered for you and has not yet been completed:  Orders Placed This Encounter      Com

## (undated) NOTE — MR AVS SNAPSHOT
[address]  [phone]  [phone]               Thank you for choosing us for your health care visit with Jaquelin Cruz PT.   We are glad to serve you and happy to provide you with

## (undated) NOTE — LETTER
[facility]  Administration of Anesthesia  1. Crystal Quezada, or _________________________________ acting on her behalf, (Patient) (Dependent/Representative) request to receive anesthesia for my pending procedure/operation/treatment. bleeding, seizure, cardiac arrest and death. 7. AWARENESS: I understand that it is possible (but unlikely) to have explicit memory of events from the operating room while under general anesthesia.   8. ELECTROCONVULSIVE THERAPY PATIENTS: This consent serve below affirms that prior to the time of the procedure, I have explained to the patient and/or his/her guardian, the risks and benefits of undergoing anesthesia, as well as any reasonable alternatives.     ___________________________________________________

## (undated) NOTE — MR AVS SNAPSHOT
[address]  [phone]               Thank you for choosing us for your health care visit with Emy Hicsk PT.   We are glad to serve you and happy to provide you with MyChart

## (undated) NOTE — LETTER
[facility]  [address], [place], [place]    Authorization for Surgical Operation and Procedure                               I hereby authorize Rudi Reinoso MD, my physician and his/her assistants (if applicable), which may include medical students, residents, and/or fellows, to perform the following surgical operation/ procedure and administer such anesthesia as may be determined necessary by my physician: Operation/Procedure name (s) COLONOSCOPY on Pastora Cavazos   2.   I recognize that during the surgical operation/procedure, unforeseen conditions may necessitate additional or different procedures than those listed above.  I, therefore, further authorize and request that the above-named surgeon, assistants, or designees perform such procedures as are, in their judgment, necessary and desirable.    3.   My surgeon/physician has discussed prior to my surgery the potential benefits, risks and side effects of this procedure; the likelihood of achieving goals; and potential problems that might occur during recuperation.  They also discussed reasonable alternatives to the procedure, including risks, benefits, and side effects related to the alternatives and risks related to not receiving this procedure.  I have had all my questions answered and I acknowledge that no guarantee has been made as to the result that may be obtained.    4.   Should the need arise during my operation/procedure, which includes change of level of care prior to discharge, I also consent to the administration of blood and/or blood products.  Further, I understand that despite careful testing and screening of blood or blood products by collecting agencies, I may still be subject to ill effects as a result of receiving a blood transfusion and/or blood products.  The following are some, but not all, of the potential risks that can occur: fever and allergic reactions, hemolytic reactions, transmission of diseases such as  Hepatitis, AIDS and Cytomegalovirus (CMV) and fluid overload.  In the event that I wish to have an autologous transfusion of my own blood, or a directed donor transfusion, I will discuss this with my physician.  Check only if Refusing Blood or Blood Products  I understand refusal of blood or blood products as deemed necessary by my physician may have serious consequences to my condition to include possible death. I hereby assume responsibility for my refusal and release the hospital, its personnel, and my physicians from any responsibility for the consequences of my refusal.    o  Refuse   5.   I authorize the use of any specimen, organs, tissues, body parts or foreign objects that may be removed from my body during the operation/procedure for diagnosis, research or teaching purposes and their subsequent disposal by hospital authorities.  I also authorize the release of specimen test results and/or written reports to my treating physician on the hospital medical staff or other referring or consulting physicians involved in my care, at the discretion of the Pathologist or my treating physician.    6.   I consent to the photographing or videotaping of the operations or procedures to be performed, including appropriate portions of my body for medical, scientific, or educational purposes, provided my identity is not revealed by the pictures or by descriptive texts accompanying them.  If the procedure has been photographed/videotaped, the surgeon will obtain the original picture, image, videotape or CD.  The hospital will not be responsible for storage, release or maintenance of the picture, image, tape or CD.    7.   I consent to the presence of a  or observers in the operating room as deemed necessary by my physician or their designees.    8.   I recognize that in the event my procedure results in extended X-Ray/fluoroscopy time, I may develop a skin reaction.    9. If I have a Do Not Attempt  Resuscitation (DNAR) order in place, that status will be suspended while in the operating room, procedural suite, and during the recovery period unless otherwise explicitly stated by me (or a person authorized to consent on my behalf). The surgeon or my attending physician will determine when the applicable recovery period ends for purposes of reinstating the DNAR order.  10. Patients having a sterilization procedure: I understand that if the procedure is successful the results will be permanent and it will therefore be impossible for me to inseminate, conceive, or bear children.  I also understand that the procedure is intended to result in sterility, although the result has not been guaranteed.   11. I acknowledge that my physician has explained sedation/analgesia administration to me including the risk and benefits I consent to the administration of sedation/analgesia as may be necessary or desirable in the judgment of my physician.    I CERTIFY THAT I HAVE READ AND FULLY UNDERSTAND THE ABOVE CONSENT TO OPERATION and/or OTHER PROCEDURE.     ____________________________________  _________________________________        ______________________________  Signature of Patient    Signature of Responsible Person                Printed Name of Responsible Person                                      ____________________________________  _____________________________                ________________________________  Signature of Witness        Date  Time         Relationship to Patient    STATEMENT OF PHYSICIAN My signature below affirms that prior to the time of the procedure; I have explained to the patient and/or his/her legal representative, the risks and benefits involved in the proposed treatment and any reasonable alternative to the proposed treatment. I have also explained the risks and benefits involved in refusal of the proposed treatment and alternatives to the proposed treatment and have answered the patient's  questions. If I have a significant financial interest in a co-management agreement or a significant financial interest in any product or implant, or other significant relationship used in this procedure/surgery, I have disclosed this and had a discussion with my patient.     _____________________________________________________              _____________________________  (Signature of Physician)                                                                                         (Date)                                   (Time)  Patient Name: Pastora Cavazos      : 1955      Printed: 2024     Medical Record #: [id number]                                      Page 1 of 1

## (undated) NOTE — MR AVS SNAPSHOT
David Don 12 [address] [facility] [address]  [phone]  [phone]               Thank you for choosing us for your health care visit with Radha Harry PT.   We are glad to serve you and happy to provide you with Apr 24, 2017  2:15 PM   [place] [facility] Visit By Therapist with Bentley Moya, [address] [facility] ([address] [place] [address])    [address]   [phone]           Please arrive

## (undated) NOTE — MR AVS SNAPSHOT
Isadora  [address]  [phone]               Thank you for choosing us for your health care visit with Victor Manuel Alonzo MD.  We are glad to serve you and happy to provide you with this albright Please arrive at your scheduled appointment time. Wear comfortable, loose fitting clothing.             May 16, 2017  1:00 PM   Post Op Visit with Juan Alberto Soria MD   [facility], [address] [facility] [address] Carlos Manuel  [facility] For Take 1 tablet by mouth every 6 (six) hours as needed for Pain. What changed: You were already taking a medication with the same name, and this prescription was added. Make sure you understand how and when to take each.    Commonly known as:  [address]

## (undated) NOTE — MR AVS SNAPSHOT
Isadora  [address]  [phone]               Thank you for choosing us for your health care visit with Sandro Schwartz MD.  We are glad to serve you and happy to provide you with this albright ibuprofen 200 MG Tabs   Take 200 mg by mouth every 6 (six) hours as needed for Pain. Commonly known as:  MOTRIN           QUEtiapine Fumarate 25 MG Tabs   Take 25 mg by mouth nightly. Commonly known as:  SEROQUEL           * triamcinolone acetonide 0.

## (undated) NOTE — Clinical Note
Thank you Dr Ana Babcock MD for the opportunity to participate in the care of this interesting patient. Please do contact me if I may be of any further assistance  Carlos Alberto Nash MD [facility] Hematology/Oncology

## (undated) NOTE — IP AVS SNAPSHOT
[address]  [address] [facility] [phone]                Discharge Summary   3/20/2017    [facility]           Admission Information        Provider Department    3/20/2017 Cesario Trinh MD [facility] 4w/ 4 AM           4 PM           rivaroxaban 10 MG Tabs   Last time this was given:  10 mg on 3/21/2017  5:39 PM   Commonly known as:  Juanjo Khalil   Next dose due:  START THIS EVENING   Notes to Patient:  ALERT: take this medication as prescribed, to prevent bloo Apply bid to affected area    Yaw Gonzalez        9 AM           9 PM           MULTIVITAMIN ADULTS 50+ Tabs   Next dose due:  START IN THE MORNING        Take 1 tablet by mouth daily.          9 AM                   NON FORMULARY   Next dose due: Follow up with Zoraida Nichols MD In 2 weeks.     Specialty:  SURGERY, ORTHOPEDIC    Why:  For wound re-check, For suture removal    Contact information:    [address], [address] [facility]  [phone]          Follow up with Sawyer Grayson ALT Bilirubin,Total Total Protein Albumin Sodium Potassium Chloride    (03/11/17)  25 (03/11/17)  0.8 (03/11/17)  6.9 (03/11/17)  4.2 (03/11/17)  138 (03/11/17)  4.5 (03/11/17)  103      Radiology Exams     None      Patient Belongings       Most Recent V Use: Lower cholesterol, protect your heart   Most common side effects: Dizziness, constipation, abnormal liver function   What to report to your healthcare team:  Dizziness, muscle aches, constipation           Blood Thinners (Anticoagulants)     rivaroxa Use: Regulate metabolism and inflammation   Most common side effects:  Dizziness, swelling, appetite changes, weight changes, changes in sleep and alertness, palpitations   What to report to your healthcare team:  Changes in thinking, confusion, skin swell

## (undated) NOTE — MR AVS SNAPSHOT
David Don 12 [facility] [address] [address]  [phone]  [phone]               Thank you for choosing us for your health care visit with Christine Dubois PT.   We are glad to serve you and happy to provide you with [address] [facility] ([address] [place] [address])    [address] [address]   [phone]           Please arrive at your scheduled appointment time. Wear comfortable, loose fitting clothing.             Apr 17, 201

## (undated) NOTE — LETTER
5/2/2024    Pastora Cavazos        [address]        [facility] [address]            Dear Pastora Cavazos,      Our records indicate that you are due for an appointment for a Colonoscopy with Rudi Reinoso MD. Our doctors are booking out about 3-6 months in advance for procedures.     Please call our office to schedule a phone screening appointment to plan for the procedure(s).   Your medical well-being is important to us.    If your insurance requires a referral, please call your primary care office to request one.      Thank you,      The Physicians and Staff at [facility]

## (undated) NOTE — MR AVS SNAPSHOT
After Visit Summary   8/24/2020    Marlen Don    MRN: [id number]           Visit Information     Date & Time  8/24/2020  1:20 PM Provider  Arturo Isabel MD [address] [place] [address], [address] [place] [address],[address], [facility]  Phone Fully enjoy your food when eating. Don’t eat while distracted and slow down. Avoid over sized portions. Don’t eat while when you’re bored.      EAT THESE FOODS MORE OFTEN: EAT THESE FOODS LESS OFTEN:   Make half your plate fruits and vegetables Highly DO YOU KNOW WHERE TO GO? Injury & Illness are never convenient. If you are dealing with a   non-emergency, consider your options before heading to an ER.   VIDEO VISITS  Visit face-to-face with a [facility] physician or   TANYA using your mobile device or computer

## (undated) NOTE — MR AVS SNAPSHOT
Isadora  [address]  [phone]               Thank you for choosing us for your health care visit with Davi Edwards MD.  We are glad to serve you and happy to provide you with this albright may be held responsible for payment in full if proper authorization is not acquired. Please contact the Patient Business Office at [phone] if you have any questions related to insurance coverage.        Tuesday May 16, 2017     Imaging:  XR KNEE ROUTINE Take 1 tablet (40 mg total) by mouth once daily. Commonly known as:  LIPITOR           Clindamycin HCl 150 MG Caps   Take 1 capsule (150 mg total) by mouth 3 (three) times daily.    Commonly known as:  CLEOCIN           escitalopram 20 MG Tabs   Take 40 m 2. Patient will demo R knee AROM at least 0-125 deg to be able to negotiate stairs reciprocally- NOT MET. 3. Patient will demo R  knee strength 5/5 and R hip strength 5/5 to be able to squat without deviation--MET  4.  Patient will demo normalized gait pat

## (undated) NOTE — LETTER
Patient Name: Pastora Cavazos  YOB: 1955          MRN number:  [id number]  Date:  8/30/2024  Referring Physician:  Samara Cui         SPINE EVALUATION:     Diagnosis:   Spinal stenosis of lumbar region with neurogenic claudication (M48.062)  Lumbar spondylosis (M47.816)      Referring Provider: Smaara Cui  Date of Evaluation:    8/29/2024    Precautions:   Anxiety, Osteoarthritis Next MD visit:   none scheduled  Date of Surgery: n/a     PATIENT SUMMARY   Pastora Cavazos is a 69 year old female who presents to therapy today with complaints of chronic low back pain    History of Current Condition: patient reports on and off back pain since age of 30's, no specific MARIE. States at this time has tried physical therapy and chiropractors with some relief. Finds that the back is much worse with a lot of activity, like yard work or mowing the grass. Patient also currently undergoing treatment for Achilles Tendinosis, was given sciatic nerve glides and patient reports no change in sx. Feels like if she walks a long time her legs become very heavy.   N/T: Denies down BLE    Pt describes pain level current 1/10, at best 0/10 in recliner, at worst 6/10.   Quality/Location: R low back/hip/gluteals, aching pain  Aggravating: bending down, increased activity   Relieving: sitting, laying down    Current functional limitations include sit<>stand, supine<>sit, walking, stair negotiation, bending down to the ground, yard work, rolling in bed, and squatting.   Home Set Up: Lives 6 months in [place], 6 months in [place]. Stairs at home, has more trouble with descent  Occupation: Retired, volunteers at animal shelter 3x/week    Pastora describes prior level of function independent. Pt goals include return to everyday activities without pain, like mowing the lawn.  Past medical history was reviewed with Pastora. Significant findings include   Past Medical History:    Anxiety    Anxiety state    Disorder of thyroid     HYPOTHYROID    Elbow fracture, left    GERD (gastroesophageal reflux disease)    High cholesterol    Hyperlipidemia    Medial meniscus tear    2012    Neck pain    neck stenosis    OCD (obsessive compulsive disorder)    Osteoarthritis    Pathological dislocation of right upper arm    Sinusitis     Pt denies diplopia, dysarthria, dysphasia, dizziness, drop attacks, bowel/bladder changes, saddle anesthesia, and JOSE LE N/T.    ASSESSMENT  Pastora presents to physical therapy evaluation with primary c/o chronic low back pain. The results of the objective tests and measures show decreased and painful lumbar AROM, mild strength deficits, positive quadrant testing, postural changes, and hypomobility at lower lumbar spine.  Functional deficits include but are not limited to sit<>stand, supine<>sit, walking, stair negotiation, bending down to the ground, yard work, rolling in bed, and squatting.  Signs and symptoms are consistent with diagnosis of referring diagnosis. Pt and PT discussed evaluation findings, pathology, POC and HEP.  Pt voiced understanding and performs HEP correctly without reported pain. Skilled Physical Therapy is medically necessary to address the above impairments and reach functional goals.     OBJECTIVE:   Observation/Posture: presents independently, no AD   Posture: Minimal R trunk lean, depressed R shoulder, decreased lumbar lordosis in static stance  Neuro Screen: Grossly intact to light touch BLE     Lumbar AROM: (* denotes performed with pain)  Flexion: 82*   Extension: 15  Sidebending: R to knee*; L grossly 50%*   Rotation: R WFL; L WFL    Accessory motion: Hypomobility with radicular sx at lower lumbar spine, central P-A and L3-L5 R unilateral PA  Palpation: TTP throughout lumbar spine, lumbar paraspinals     Strength: (* denotes performed with pain)  LE   Hip flexion (L2): R 4/5; L 5/5  Hip abduction: R 4-*/5; L 4*/5  Hip Extension: R 4*/5; L 4+/5   Hip ER: R 4+/5; L 4+/5  Hip IR: R 4+/5; L  4+/5  Knee Flexion: R 5/5; L 5/5   Knee extension (L3): R 5/5; L 5/5   DF (L4): R 5/5; L 5/5       Flexibility:   LE   Hip Flexor: R minimal restriction, L WFL  Hamstrings: Not Tested  Piriformis: Not Tested     Special tests:   Lumbar Quadrant Testing: + for sx reproduction with R/L extension plane (L>R)   Slump: Not Tested  SLR: Not Tested     Gait: pt ambulates on level ground with  decreased hip extension bilaterally, antalgia, and decreased trunk rotation  .    Today’s Treatment and Response:   Pt education was provided on exam findings, treatment diagnosis, treatment plan, expectations, and prognosis. Pt was also provided recommendations for activity modifications, possible soreness after evaluation, modalities as needed [ice/heat], postural corrections, ergonomics, pain science education , and importance of remaining active  Patient was instructed in and issued a HEP for:   Access Code: [email or web address]  URL: [email or web address]  Date: 08/29/2024  Prepared by: Fawn Ford    Exercises  - Standing Back Extension at Wall  - 1-2 x daily - 7 x weekly - 2 sets - 10 reps - 3 second hold   OR  - Prone Press Up On Elbows  - 1-2 x daily - 7 x weekly - 2 sets - 10 reps - 3 second hold      Charges: PT Eval Moderate Complexity, 1 TE      Total Timed Treatment: 10' min     Total Treatment Time: 45'  min     Based on clinical rationale and outcome measures, this evaluation involved Moderate Complexity decision making due to 1-2 personal factors/comorbidities, 4+ body structures involved/activity limitations, and evolving symptoms including changing pain levels.  PLAN OF CARE:    Goals: (to be met in 10 visits)   Pt will improve transversus abdominis recruitment to perform proper isometric contraction without requiring verbal or tactile cuing to promote advancement of therex   Pt will demonstrate good understanding of proper posture and body mechanics to decrease pain and improve spinal safety   Pt will  improve lumbar spine AROM flexion to >85 deg pain free to allow increase ease with bending forward to don shoes   Pt will improve lumbar extension AROM   Pt will report improved symptom centralization and absence of radicular symptoms for 3 consecutive days to improve function with ADL   Pt will have decreased paraspinal mm tension to tolerate performing supine<>sit transfers.    Pt will demonstrate improved core strength to be able to mow the lawn with <3/10 pain   Pt will be independent and compliant with comprehensive HEP to maintain progress achieved in PT     Frequency / Duration: Patient will be seen for 1-2 x/week or a total of 10 visits over a 90 day period. Treatment will include: Gait training, Manual Therapy, Neuromuscular Re-education, Self-Care Home Management, Therapeutic Activities, Therapeutic Exercise, and Home Exercise Program instruction    Education or treatment limitation: None  Rehab Potential:good    Oswestry Disability Index Score  Score: 32 % (8/28/2024  6:19 PM)      Patient/Family/Caregiver was advised of these findings, precautions, and treatment options and has agreed to actively participate in planning and for this course of care.    Thank you for your referral. Please co-sign or sign and return this letter via fax as soon as possible to [phone]. If you have any questions, please contact me at Dept: [phone]    Sincerely,  Electronically signed by therapist: Fawn Ford, PT    Physician's certification required: Yes  I certify the need for these services furnished under this plan of treatment and while under my care.    X___________________________________________________ Date____________________    Certification From: 8/29/2024  To:11/27/2024      21st Century Cures Act Notice to Patient: Medical documents like this are made available to patients in the interest of transparency. However, be advised this is a medical document and it is intended as eyer-la-twur  communication between your medical providers. This medical document may contain abbreviations, assessments, medical data, and results or other terms that are unfamiliar. Medical documents are intended to carry relevant information, facts as evident, and the clinical opinion of the practitioner. As such, this medical document may be written in language that appears blunt or direct. You are encouraged to contact your medical provider and/or [facility] Patient Experience if you have any questions about this medical document.

## (undated) NOTE — MR AVS SNAPSHOT
David Don [address] [place]  [phone]               Thank you for choosing us for your health care visit with Juan Castañeda, PT.   We are glad to serve you and happy to provide you with Please arrive at your scheduled appointment time. Wear comfortable, loose fitting clothing.             May 15, 2017  2:15 PM   [place] Physical Therapy Visit By Therapist with Eduin Arguello, [address] [facility]

## (undated) NOTE — MR AVS SNAPSHOT
[facility] [address] [place]  [address]  [phone]               Thank you for choosing us for your health care visit with Yuri Lacey MD.  We are glad to serve you and happy to provide you with this summary of Please arrive at your scheduled appointment time. Wear comfortable, loose fitting clothing.             Apr 24, 2017  2:15 PM   [place] Physical Therapy Visit By Therapist with Irene Lopez, [address] [facility] Apply 1 Application topically 2 (two) times daily. MetroNIDAZOLE 1 % Gel   Apply bid to affected area   Commonly known as:  METROGEL           MULTIVITAMIN ADULTS 50+ Tabs   Take 1 tablet by mouth daily.            QUEtiapine Fumarate 25 MG Tabs

## (undated) NOTE — MR AVS SNAPSHOT
Isadora  [address]  [phone]               Thank you for choosing us for your health care visit with Pedro Hurtado MD.  We are glad to serve you and happy to provide you with this albright * Notice: This list has 2 medication(s) that are the same as other medications prescribed for you. Read the directions carefully, and ask your doctor or other care provider to review them with you.             Today's Orders     MRI JAIME KNEE RIGHT (CPT

## (undated) NOTE — LETTER
Dear Dr. Evelio Sierra    This letter is to inform you that Emily Leonard has been attending Physical Therapy with me. See below for my most recent plan of care.        Patient Name: Emily Leonard, : 1955, MRN: [id number]   Date:   Exercises; Neuromuscular Re-education;  Therapeutic Activity; Gait Training; Patient education; Home Exercise Program; modalities prn       Patient was advised of these findings, precautions, and treatment options and has agreed to actively participate in p

## (undated) NOTE — MR AVS SNAPSHOT
[address]  [phone]  [phone]               Thank you for choosing us for your health care visit with Bentley Moya PT.   We are glad to serve you and happy to provide you with Please arrive at your scheduled appointment time. Wear comfortable, loose fitting clothing.             Apr 24, 2017  2:15 PM   [place] [facility] Visit By Therapist with Louis Burrell, [address] [facility]

## (undated) NOTE — MR AVS SNAPSHOT
[place]  [address]  [phone]               Thank you for choosing us for your health care visit with Dontrell Key MD.  We are glad to serve you and happy to provide you with this albright [place] [facility] Visit By Therapist with Brianne Tavarez, [address] [facility] ([address] [facility])    [address]   [phone]           Please arrive at your scheduled appointment Apply 1 Application topically 2 (two) times daily. MetroNIDAZOLE 1 % Gel   Apply bid to affected area   Commonly known as:  METROGEL           MULTIVITAMIN ADULTS 50+ Tabs   Take 1 tablet by mouth daily.            QUEtiapine Fumarate 25 MG Tabs

## (undated) NOTE — MR AVS SNAPSHOT
David Don [address]  [phone]  [phone]               Thank you for choosing us for your health care visit with Bentley Moya PT.   We are glad to serve you and happy to provide you with Apr 26, 2017  2:15 PM   [place] [facility] Visit By Therapist with Louis Burrell, [address] [facility] ([facility])    [address]   [phone]           Please arrive

## (undated) NOTE — LETTER
Dear Dr. Cristin Mccullough    This letter is to inform you that Ayleen Venegas has been attending Physical Therapy with me. See below for my most recent plan of care.        Patient Name: Ayleen Venegas, : 1955, MRN: [id number]   Date:  2017

## (undated) NOTE — MR AVS SNAPSHOT
[place]  [address]  [phone]               Thank you for choosing us for your health care visit with Santino Tobar MD.  We are glad to serve you and happy to provide you with this albright Take 1 tablet (40 mg total) by mouth once daily. Commonly known as:  LIPITOR           escitalopram 20 MG Tabs   Take 40 mg by mouth daily.    Commonly known as:  LEXAPRO           FLONASE 50 MCG/ACT Susp   Generic drug:  Fluticasone Propionate   by Nasal

## (undated) NOTE — MR AVS SNAPSHOT
David Don [address]  [phone]  [phone]               Thank you for choosing us for your health care visit with Magui Reed PT.   We are glad to serve you and happy to provide you with Please arrive at your scheduled appointment time. Wear comfortable, loose fitting clothing.             May 16, 2017  1:00 PM   Post Op Visit with Alona Cockayne, MD   [facility], [address] [facility]

## (undated) NOTE — LETTER
[facility]  Administration of Anesthesia  I, Pastora Cavazos agree to be cared for by a physician anesthesiologist alone and/or with a nurse anesthetist, who is specially trained to monitor me and give me medicine to put me to sleep or keep me comfortable during my procedure    I understand that my anesthesiologist and/or anesthetist is not an employee or agent of [facility] or [employer]. He or she works for [facility]    As the patient asking for anesthesia services, I agree to:  Allow the anesthesiologist (anesthesia doctor) to give me medicine and do additional procedures as necessary. Some examples are: Starting or using an “IV” to give me medicine, fluids or blood during my procedure, and having a breathing tube placed to help me breathe when I’m asleep (intubation). In the event that my heart stops working properly, I understand that my anesthesiologist will make every effort to sustain my life, unless otherwise directed by [facility] Do Not Resuscitate documents.  Tell my anesthesia doctor before my procedure:  If I am pregnant.  The last time that I ate or drank.  iii. All of the medicines I take (including prescriptions, herbal supplements, and pills I can buy without a prescription (including street drugs/illegal medications). Failure to inform my anesthesiologist about these medicines may increase my risk of anesthetic complications.  iv.If I am allergic to anything or have had a reaction to anesthesia before.  I understand how the anesthesia medicine will help me (benefits).  I understand that with any type of anesthesia medicine there are risks:  The most common risks are: nausea, vomiting, sore throat, muscle soreness, damage to my eyes, mouth, or teeth (from breathing tube placement).  Rare risks include: remembering what happened during my procedure, allergic reactions to medications, injury to my airway, heart, lungs, vision, nerves, or  muscles and in extremely rare instances death.  My doctor has explained to me other choices available to me for my care (alternatives).  Pregnant Patients (“epidural”):  I understand that the risks of having an epidural (medicine given into my back to help control pain during labor), include itching, low blood pressure, difficulty urinating, headache or slowing of the baby’s heart. Very rare risks include infection, bleeding, seizure, irregular heart rhythms and nerve injury.  Regional Anesthesia (“spinal”, “epidural”, & “nerve blocks”):  I understand that rare but potential complications include headache, bleeding, infection, seizure, irregular heart rhythms, and nerve injury.    _____________________________________________________________________________  Patient (or Representative) Signature/Relationship to Patient  Date   Time    _____________________________________________________________________________   Name (if used)    Language/Organization   Time    _____________________________________________________________________________  Nurse Anesthetist Signature     Date   Time  _____________________________________________________________________________  Anesthesiologist Signature     Date   Time  I have discussed the procedure and information above with the patient (or patient’s representative) and answered their questions. The patient or their representative has agreed to have anesthesia services.    _____________________________________________________________________________  Witness        Date   Time  I have verified that the signature is that of the patient or patient’s representative, and that it was signed before the procedure  Patient Name: Pastora Cavazos     : 1955                 Printed: 2024 at 1:01 PM    Medical Record #: [id number]                                            Page 1 of 1  ----------ANESTHESIA CONSENT----------

## (undated) NOTE — LETTER
Dear Dr. Juan Alberto Soria    This letter is to inform you that Kendra Wick has been attending Physical Therapy with me. See below for my most recent plan of care.        Patient Name: Kendra Wick, : 1955, MRN: [id number]   Date:  10/12/2

## (undated) NOTE — MR AVS SNAPSHOT
[facility] [address]  [id number] [phone]               Thank you for choosing us for your health care visit with Jyoti Knapp MD.  We are glad to serve you and happy to provide you with this summary of Vit D total    Complete by: Mar 07, 2017 (Approximate)    Assoc Dx:  Routine general medical examination at a health care facility [S15.08]           Folic Acid Serum [E]    Complete by:   Mar 07, 2017 (Approximate)    Assoc Dx:  Routine general medical e PARADISE THYROID 15 MG Tabs   Generic drug:  thyroid   TK ONE T PO QAM ONE HOUR BEFORE BREAKFAST. Atorvastatin Calcium 40 MG Tabs   Take 1 tablet (40 mg total) by mouth once daily.    Commonly known as:  LIPITOR           escitalopram 20 MG Tabs

## (undated) NOTE — LETTER
[facility], [place]  Authorization for Invasive Procedures  1.  I hereby authorize Dr. Damaso Stephens , my physician and whomever may be designated as the doctor's assistant, to perform the following operation and/or procedure:  Colonoscop allergic reactions, hemolytic reactions, transmission of disease such as hepatitis, AIDS, cytomegalovirus (CMV), and flluid overload.  In the event that I wish to have autologous transfusions of my own blood, or a directed donor transfusion, I will discuss Patient:  ________________________________________________ Date: _________Time: _________    Responsible person in case of minor or unconscious: _____________________________Relationship: ____________     Witness Signature: ________________________________

## (undated) NOTE — MR AVS SNAPSHOT
David Don [address]  [phone]  [phone]               Thank you for choosing us for your health care visit with J Carlos Brothers PT.   We are glad to serve you and happy to provide you with Please arrive at your scheduled appointment time. Wear comfortable, loose fitting clothing.             May 12, 2017  1:45 PM   [place] [facility] Visit By Therapist with Hina Grande [address] [place] [address]

## (undated) NOTE — MR AVS SNAPSHOT
[place]  [address]  [phone]               Thank you for choosing us for your health care visit with Santino Tobar MD.  We are glad to serve you and happy to provide you with this albright [phone]           Please arrive at your scheduled appointment time. Wear comfortable, loose fitting clothing.             Apr 10, 2017  3:00 PM   Exam - Established with Karin Alaniz MD   [facility] SPECIALTY [facility] ([facility] It is the patient's responsibility to check with and follow their insurance company's guidelines for prior authorization for this test.  You may be held responsible for payment in full if proper authorization is not acquired.   Please contact the Patient [place], [address] [facility], [address] [facility]  [address], [address]        2 times a week for 4 weeks  Focus on quad, hamstring, and IT band stretching/strengthening, core and pelvic stabilization, and increasing ROM.  Mod What changed: You were already taking a medication with the same name, and this prescription was added. Make sure you understand how and when to take each.    Commonly known as:  NORCO           hydrocortisone 2.5 % Crea   Apply 1 Application topically 2 (

## (undated) NOTE — MR AVS SNAPSHOT
David Don [address]  [phone]  [phone]               Thank you for choosing us for your health care visit with Renae Chapman PT.   We are glad to serve you and happy to provide you with Please arrive at your scheduled appointment time. Wear comfortable, loose fitting clothing.             May 16, 2017  1:00 PM   Post Op Visit with Sandip Hollingsworth MD   [facility], [address] [facility]

## (undated) NOTE — LETTER
Hospital Discharge Documentation  From: [address] Hospitalist's Office  Phone: [phone]    Patient discharged time/date: 3/22/2017  4:07 PM  Patient discharge disposition:  Home or Self Care       Discharge Summaries - D/C Summary      Discharge S Pt on xarelto for dvt proph. Oxycontin increased for better pain control.     H/o anxiety, OCD  - cont home meds    HL - con't statin    Discharge Condition: Good    Discharge Medications:      Discharge Medications      START taking these medications Take 1 tablet (40 mg total) by mouth once daily. Quantity:  90 tablet   Refills:  3       escitalopram 20 MG Tabs   Last time this was given:  40 mg on 3/22/2017  7:59 AM   Commonly known as:  LEXAPRO        Take 40 mg by mouth daily.     Refills: - Sennosides 17.2 MG Tabs          Follow up Visits: Follow-up Information     Follow up with Zoraida Nichols MD In 2 weeks.     Specialty:  SURGERY, ORTHOPEDIC    Why:  For wound re-check, For suture removal    Contact information:    [address]